# Patient Record
Sex: MALE | Race: WHITE | NOT HISPANIC OR LATINO | URBAN - METROPOLITAN AREA
[De-identification: names, ages, dates, MRNs, and addresses within clinical notes are randomized per-mention and may not be internally consistent; named-entity substitution may affect disease eponyms.]

---

## 2017-01-02 ENCOUNTER — COMMUNICATION - HEALTHEAST (OUTPATIENT)
Dept: INTERNAL MEDICINE | Facility: CLINIC | Age: 82
End: 2017-01-02

## 2017-01-02 DIAGNOSIS — E78.5 HYPERLIPIDEMIA: ICD-10-CM

## 2017-01-09 ENCOUNTER — AMBULATORY - HEALTHEAST (OUTPATIENT)
Dept: PODIATRY | Facility: CLINIC | Age: 82
End: 2017-01-09

## 2017-01-09 DIAGNOSIS — L60.2 ONYCHAUXIS: ICD-10-CM

## 2017-01-09 DIAGNOSIS — N18.30 CKD (CHRONIC KIDNEY DISEASE) STAGE 3, GFR 30-59 ML/MIN (H): ICD-10-CM

## 2017-01-09 DIAGNOSIS — E11.49 TYPE 2 DIABETES MELLITUS WITH NEUROLOGICAL MANIFESTATIONS (H): ICD-10-CM

## 2017-01-09 DIAGNOSIS — M79.673 PAIN OF FOOT, UNSPECIFIED LATERALITY: ICD-10-CM

## 2017-01-20 ENCOUNTER — OFFICE VISIT - HEALTHEAST (OUTPATIENT)
Dept: INTERNAL MEDICINE | Facility: CLINIC | Age: 82
End: 2017-01-20

## 2017-01-20 DIAGNOSIS — I10 HTN (HYPERTENSION): ICD-10-CM

## 2017-01-20 DIAGNOSIS — Z71.89 COUNSELING FOR LIVING WILL: ICD-10-CM

## 2017-01-20 DIAGNOSIS — I63.9 CVA (CEREBRAL VASCULAR ACCIDENT) (H): ICD-10-CM

## 2017-01-20 DIAGNOSIS — N18.30 CKD (CHRONIC KIDNEY DISEASE) STAGE 3, GFR 30-59 ML/MIN (H): ICD-10-CM

## 2017-01-20 DIAGNOSIS — G30.9 ALZHEIMER'S DEMENTIA (H): ICD-10-CM

## 2017-01-20 DIAGNOSIS — Z02.89 ENCOUNTER FOR COMPLETION OF FORM WITH PATIENT: ICD-10-CM

## 2017-01-20 DIAGNOSIS — R80.9 MICROALBUMINURIA: ICD-10-CM

## 2017-01-20 DIAGNOSIS — F02.80 ALZHEIMER'S DEMENTIA (H): ICD-10-CM

## 2017-01-20 DIAGNOSIS — H54.8 LEGALLY BLIND: ICD-10-CM

## 2017-01-20 DIAGNOSIS — Z78.9 FULL CODE STATUS: ICD-10-CM

## 2017-01-20 DIAGNOSIS — I25.10 ASCVD (ARTERIOSCLEROTIC CARDIOVASCULAR DISEASE): ICD-10-CM

## 2017-01-20 DIAGNOSIS — E11.9 DM TYPE 2 (DIABETES MELLITUS, TYPE 2) (H): ICD-10-CM

## 2017-01-20 DIAGNOSIS — R60.0 BILATERAL LOWER EXTREMITY EDEMA: ICD-10-CM

## 2017-01-20 LAB — HBA1C MFR BLD: 7.3 % (ref 3.5–6)

## 2017-03-13 ENCOUNTER — AMBULATORY - HEALTHEAST (OUTPATIENT)
Dept: PODIATRY | Facility: CLINIC | Age: 82
End: 2017-03-13

## 2017-03-13 DIAGNOSIS — E11.49 TYPE 2 DIABETES MELLITUS WITH NEUROLOGICAL MANIFESTATIONS (H): ICD-10-CM

## 2017-03-13 DIAGNOSIS — M79.673 PAIN OF FOOT, UNSPECIFIED LATERALITY: ICD-10-CM

## 2017-03-13 DIAGNOSIS — L60.2 ONYCHAUXIS: ICD-10-CM

## 2017-03-13 DIAGNOSIS — N18.30 CKD (CHRONIC KIDNEY DISEASE) STAGE 3, GFR 30-59 ML/MIN (H): ICD-10-CM

## 2017-03-13 ASSESSMENT — MIFFLIN-ST. JEOR: SCORE: 1337.17

## 2017-03-31 ENCOUNTER — COMMUNICATION - HEALTHEAST (OUTPATIENT)
Dept: INTERNAL MEDICINE | Facility: CLINIC | Age: 82
End: 2017-03-31

## 2017-03-31 DIAGNOSIS — E78.5 HYPERLIPIDEMIA: ICD-10-CM

## 2017-05-06 ENCOUNTER — COMMUNICATION - HEALTHEAST (OUTPATIENT)
Dept: INTERNAL MEDICINE | Facility: CLINIC | Age: 82
End: 2017-05-06

## 2017-05-22 ENCOUNTER — COMMUNICATION - HEALTHEAST (OUTPATIENT)
Dept: TELEHEALTH | Facility: CLINIC | Age: 82
End: 2017-05-22

## 2017-05-22 ENCOUNTER — AMBULATORY - HEALTHEAST (OUTPATIENT)
Dept: PODIATRY | Facility: CLINIC | Age: 82
End: 2017-05-22

## 2017-05-22 DIAGNOSIS — M79.673 PAIN OF FOOT, UNSPECIFIED LATERALITY: ICD-10-CM

## 2017-05-22 DIAGNOSIS — L60.2 ONYCHAUXIS: ICD-10-CM

## 2017-05-22 DIAGNOSIS — N18.30 CKD (CHRONIC KIDNEY DISEASE) STAGE 3, GFR 30-59 ML/MIN (H): ICD-10-CM

## 2017-05-22 DIAGNOSIS — E11.49 TYPE 2 DIABETES MELLITUS WITH NEUROLOGICAL MANIFESTATIONS (H): ICD-10-CM

## 2017-05-25 ENCOUNTER — OFFICE VISIT - HEALTHEAST (OUTPATIENT)
Dept: INTERNAL MEDICINE | Facility: CLINIC | Age: 82
End: 2017-05-25

## 2017-05-25 DIAGNOSIS — Z78.9 FULL CODE STATUS: ICD-10-CM

## 2017-05-25 DIAGNOSIS — F02.80 ALZHEIMER'S DEMENTIA (H): ICD-10-CM

## 2017-05-25 DIAGNOSIS — I10 HTN (HYPERTENSION): ICD-10-CM

## 2017-05-25 DIAGNOSIS — G30.9 ALZHEIMER'S DEMENTIA (H): ICD-10-CM

## 2017-05-25 DIAGNOSIS — N18.30 CKD (CHRONIC KIDNEY DISEASE) STAGE 3, GFR 30-59 ML/MIN (H): ICD-10-CM

## 2017-05-25 DIAGNOSIS — Z00.00 ROUTINE PHYSICAL EXAMINATION: ICD-10-CM

## 2017-05-25 DIAGNOSIS — R32 UI (URINARY INCONTINENCE): ICD-10-CM

## 2017-05-25 DIAGNOSIS — Z02.89 ENCOUNTER FOR COMPLETION OF FORM WITH PATIENT: ICD-10-CM

## 2017-05-25 DIAGNOSIS — I25.10 ASCVD (ARTERIOSCLEROTIC CARDIOVASCULAR DISEASE): ICD-10-CM

## 2017-05-25 DIAGNOSIS — G62.9 PN (PERIPHERAL NEUROPATHY): ICD-10-CM

## 2017-05-25 DIAGNOSIS — E11.9 DM TYPE 2 (DIABETES MELLITUS, TYPE 2) (H): ICD-10-CM

## 2017-05-25 DIAGNOSIS — E78.5 HLD (HYPERLIPIDEMIA): ICD-10-CM

## 2017-05-25 ASSESSMENT — MIFFLIN-ST. JEOR: SCORE: 1346.24

## 2017-06-08 ENCOUNTER — RECORDS - HEALTHEAST (OUTPATIENT)
Dept: ADMINISTRATIVE | Facility: OTHER | Age: 82
End: 2017-06-08

## 2017-06-12 ENCOUNTER — COMMUNICATION - HEALTHEAST (OUTPATIENT)
Dept: INTERNAL MEDICINE | Facility: CLINIC | Age: 82
End: 2017-06-12

## 2017-06-25 ENCOUNTER — COMMUNICATION - HEALTHEAST (OUTPATIENT)
Dept: INTERNAL MEDICINE | Facility: CLINIC | Age: 82
End: 2017-06-25

## 2017-06-25 DIAGNOSIS — I10 HTN (HYPERTENSION): ICD-10-CM

## 2017-07-05 ENCOUNTER — COMMUNICATION - HEALTHEAST (OUTPATIENT)
Dept: INTERNAL MEDICINE | Facility: CLINIC | Age: 82
End: 2017-07-05

## 2017-07-06 ENCOUNTER — COMMUNICATION - HEALTHEAST (OUTPATIENT)
Dept: INTERNAL MEDICINE | Facility: CLINIC | Age: 82
End: 2017-07-06

## 2017-07-08 ENCOUNTER — COMMUNICATION - HEALTHEAST (OUTPATIENT)
Dept: INTERNAL MEDICINE | Facility: CLINIC | Age: 82
End: 2017-07-08

## 2017-07-08 DIAGNOSIS — E78.5 HYPERLIPIDEMIA: ICD-10-CM

## 2017-07-12 ENCOUNTER — COMMUNICATION - HEALTHEAST (OUTPATIENT)
Dept: INTERNAL MEDICINE | Facility: CLINIC | Age: 82
End: 2017-07-12

## 2017-07-13 ENCOUNTER — OFFICE VISIT - HEALTHEAST (OUTPATIENT)
Dept: INTERNAL MEDICINE | Facility: CLINIC | Age: 82
End: 2017-07-13

## 2017-07-13 DIAGNOSIS — F02.80 ALZHEIMER'S DEMENTIA (H): ICD-10-CM

## 2017-07-13 DIAGNOSIS — I10 HTN (HYPERTENSION): ICD-10-CM

## 2017-07-13 DIAGNOSIS — R15.9 FECAL INCONTINENCE: ICD-10-CM

## 2017-07-13 DIAGNOSIS — G30.9 ALZHEIMER'S DEMENTIA (H): ICD-10-CM

## 2017-07-13 DIAGNOSIS — I63.9 CVA (CEREBRAL VASCULAR ACCIDENT) (H): ICD-10-CM

## 2017-07-13 DIAGNOSIS — R32 URINARY INCONTINENCE: ICD-10-CM

## 2017-07-26 ENCOUNTER — OFFICE VISIT - HEALTHEAST (OUTPATIENT)
Dept: GERIATRICS | Facility: CLINIC | Age: 82
End: 2017-07-26

## 2017-07-26 DIAGNOSIS — F02.80 ALZHEIMER'S DISEASE OF OTHER ONSET: ICD-10-CM

## 2017-07-26 DIAGNOSIS — Z91.81 HX OF FALL: ICD-10-CM

## 2017-07-26 DIAGNOSIS — G30.8 ALZHEIMER'S DISEASE OF OTHER ONSET: ICD-10-CM

## 2017-07-26 DIAGNOSIS — E11.65 TYPE 2 DIABETES MELLITUS WITH HYPERGLYCEMIA (H): ICD-10-CM

## 2017-07-26 DIAGNOSIS — I25.10 ASCVD (ARTERIOSCLEROTIC CARDIOVASCULAR DISEASE): ICD-10-CM

## 2017-07-31 ENCOUNTER — OFFICE VISIT - HEALTHEAST (OUTPATIENT)
Dept: GERIATRICS | Facility: CLINIC | Age: 82
End: 2017-07-31

## 2017-07-31 DIAGNOSIS — F02.80 ALZHEIMER'S DISEASE OF OTHER ONSET: ICD-10-CM

## 2017-07-31 DIAGNOSIS — G30.8 ALZHEIMER'S DISEASE OF OTHER ONSET: ICD-10-CM

## 2017-07-31 DIAGNOSIS — F03.90 DEMENTIA (H): ICD-10-CM

## 2017-07-31 DIAGNOSIS — I25.10 ASCVD (ARTERIOSCLEROTIC CARDIOVASCULAR DISEASE): ICD-10-CM

## 2017-07-31 DIAGNOSIS — E11.65 TYPE 2 DIABETES MELLITUS WITH HYPERGLYCEMIA (H): ICD-10-CM

## 2017-08-07 ENCOUNTER — OFFICE VISIT - HEALTHEAST (OUTPATIENT)
Dept: GERIATRICS | Facility: CLINIC | Age: 82
End: 2017-08-07

## 2017-08-07 DIAGNOSIS — F02.80 ALZHEIMER'S DISEASE OF OTHER ONSET: ICD-10-CM

## 2017-08-07 DIAGNOSIS — G30.8 ALZHEIMER'S DISEASE OF OTHER ONSET: ICD-10-CM

## 2017-08-07 DIAGNOSIS — E11.65 TYPE 2 DIABETES MELLITUS WITH HYPERGLYCEMIA (H): ICD-10-CM

## 2017-08-07 DIAGNOSIS — F03.90 DEMENTIA (H): ICD-10-CM

## 2017-08-07 DIAGNOSIS — I25.10 ASCVD (ARTERIOSCLEROTIC CARDIOVASCULAR DISEASE): ICD-10-CM

## 2017-08-14 ENCOUNTER — OFFICE VISIT - HEALTHEAST (OUTPATIENT)
Dept: GERIATRICS | Facility: CLINIC | Age: 82
End: 2017-08-14

## 2017-08-14 DIAGNOSIS — F02.80 ALZHEIMER'S DISEASE OF OTHER ONSET: ICD-10-CM

## 2017-08-14 DIAGNOSIS — E11.65 TYPE 2 DIABETES MELLITUS WITH HYPERGLYCEMIA (H): ICD-10-CM

## 2017-08-14 DIAGNOSIS — I25.10 ASCVD (ARTERIOSCLEROTIC CARDIOVASCULAR DISEASE): ICD-10-CM

## 2017-08-14 DIAGNOSIS — G30.8 ALZHEIMER'S DISEASE OF OTHER ONSET: ICD-10-CM

## 2017-08-14 DIAGNOSIS — I10 ESSENTIAL HYPERTENSION: ICD-10-CM

## 2017-09-21 ENCOUNTER — OFFICE VISIT - HEALTHEAST (OUTPATIENT)
Dept: GERIATRICS | Facility: CLINIC | Age: 82
End: 2017-09-21

## 2017-09-21 DIAGNOSIS — N18.30 CKD (CHRONIC KIDNEY DISEASE) STAGE 3, GFR 30-59 ML/MIN (H): ICD-10-CM

## 2017-09-21 DIAGNOSIS — F02.80 ALZHEIMER'S DISEASE OF OTHER ONSET WITHOUT BEHAVIORAL DISTURBANCE: ICD-10-CM

## 2017-09-21 DIAGNOSIS — E78.5 HLD (HYPERLIPIDEMIA): ICD-10-CM

## 2017-09-21 DIAGNOSIS — I10 ESSENTIAL HYPERTENSION: ICD-10-CM

## 2017-09-21 DIAGNOSIS — E11.3293 TYPE 2 DIABETES MELLITUS WITH BOTH EYES AFFECTED BY MILD NONPROLIFERATIVE RETINOPATHY WITHOUT MACULAR EDEMA, WITHOUT LONG-TERM CURRENT USE OF INSULIN (H): ICD-10-CM

## 2017-09-21 DIAGNOSIS — G30.8 ALZHEIMER'S DISEASE OF OTHER ONSET WITHOUT BEHAVIORAL DISTURBANCE: ICD-10-CM

## 2017-10-25 ENCOUNTER — OFFICE VISIT - HEALTHEAST (OUTPATIENT)
Dept: GERIATRICS | Facility: CLINIC | Age: 82
End: 2017-10-25

## 2017-10-25 DIAGNOSIS — F02.80 ALZHEIMER'S DISEASE OF OTHER ONSET WITHOUT BEHAVIORAL DISTURBANCE: ICD-10-CM

## 2017-10-25 DIAGNOSIS — I10 ESSENTIAL HYPERTENSION: ICD-10-CM

## 2017-10-25 DIAGNOSIS — N18.30 CKD (CHRONIC KIDNEY DISEASE) STAGE 3, GFR 30-59 ML/MIN (H): ICD-10-CM

## 2017-10-25 DIAGNOSIS — G30.8 ALZHEIMER'S DISEASE OF OTHER ONSET WITHOUT BEHAVIORAL DISTURBANCE: ICD-10-CM

## 2017-10-25 DIAGNOSIS — E11.65 TYPE 2 DIABETES MELLITUS WITH HYPERGLYCEMIA (H): ICD-10-CM

## 2017-10-31 ENCOUNTER — RECORDS - HEALTHEAST (OUTPATIENT)
Dept: LAB | Facility: CLINIC | Age: 82
End: 2017-10-31

## 2017-11-01 ENCOUNTER — AMBULATORY - HEALTHEAST (OUTPATIENT)
Dept: GERIATRICS | Facility: CLINIC | Age: 82
End: 2017-11-01

## 2017-11-01 LAB — HBA1C MFR BLD: 8.7 % (ref 4.2–6.1)

## 2017-11-15 ENCOUNTER — OFFICE VISIT - HEALTHEAST (OUTPATIENT)
Dept: GERIATRICS | Facility: CLINIC | Age: 82
End: 2017-11-15

## 2017-11-15 DIAGNOSIS — E11.3293 TYPE 2 DIABETES MELLITUS WITH BOTH EYES AFFECTED BY MILD NONPROLIFERATIVE RETINOPATHY WITHOUT MACULAR EDEMA, WITHOUT LONG-TERM CURRENT USE OF INSULIN (H): ICD-10-CM

## 2017-11-15 DIAGNOSIS — N18.30 CKD (CHRONIC KIDNEY DISEASE) STAGE 3, GFR 30-59 ML/MIN (H): ICD-10-CM

## 2017-11-15 DIAGNOSIS — I10 ESSENTIAL HYPERTENSION: ICD-10-CM

## 2017-11-15 DIAGNOSIS — E55.9 VITAMIN D DEFICIENCY: ICD-10-CM

## 2017-12-21 ENCOUNTER — OFFICE VISIT - HEALTHEAST (OUTPATIENT)
Dept: GERIATRICS | Facility: CLINIC | Age: 82
End: 2017-12-21

## 2017-12-21 DIAGNOSIS — G30.8 ALZHEIMER'S DISEASE OF OTHER ONSET WITHOUT BEHAVIORAL DISTURBANCE: ICD-10-CM

## 2017-12-21 DIAGNOSIS — E55.9 VITAMIN D DEFICIENCY: ICD-10-CM

## 2017-12-21 DIAGNOSIS — F02.80 ALZHEIMER'S DISEASE OF OTHER ONSET WITHOUT BEHAVIORAL DISTURBANCE: ICD-10-CM

## 2017-12-21 DIAGNOSIS — E11.3293 TYPE 2 DIABETES MELLITUS WITH BOTH EYES AFFECTED BY MILD NONPROLIFERATIVE RETINOPATHY WITHOUT MACULAR EDEMA, WITHOUT LONG-TERM CURRENT USE OF INSULIN (H): ICD-10-CM

## 2017-12-21 DIAGNOSIS — I10 ESSENTIAL HYPERTENSION: ICD-10-CM

## 2017-12-21 DIAGNOSIS — N18.30 CKD (CHRONIC KIDNEY DISEASE) STAGE 3, GFR 30-59 ML/MIN (H): ICD-10-CM

## 2017-12-29 ENCOUNTER — AMBULATORY - HEALTHEAST (OUTPATIENT)
Dept: GERIATRICS | Facility: CLINIC | Age: 82
End: 2017-12-29

## 2018-01-02 ENCOUNTER — RECORDS - HEALTHEAST (OUTPATIENT)
Dept: LAB | Facility: CLINIC | Age: 83
End: 2018-01-02

## 2018-01-02 LAB
ALBUMIN UR-MCNC: ABNORMAL MG/DL
APPEARANCE UR: ABNORMAL
BACTERIA #/AREA URNS HPF: ABNORMAL HPF
BILIRUB UR QL STRIP: NEGATIVE
COLOR UR AUTO: YELLOW
GLUCOSE UR STRIP-MCNC: NEGATIVE MG/DL
HGB BLD-MCNC: 11.8 G/DL (ref 14–18)
HGB UR QL STRIP: ABNORMAL
KETONES UR STRIP-MCNC: NEGATIVE MG/DL
LEUKOCYTE ESTERASE UR QL STRIP: ABNORMAL
MUCOUS THREADS #/AREA URNS LPF: ABNORMAL LPF
NITRATE UR QL: NEGATIVE
PH UR STRIP: 6 [PH] (ref 4.5–8)
RBC #/AREA URNS AUTO: ABNORMAL HPF
SP GR UR STRIP: 1.01 (ref 1–1.03)
SQUAMOUS #/AREA URNS AUTO: ABNORMAL LPF
UROBILINOGEN UR STRIP-ACNC: ABNORMAL
WBC #/AREA URNS AUTO: ABNORMAL HPF
WBC CLUMPS #/AREA URNS HPF: PRESENT /[HPF]

## 2018-01-03 LAB — BACTERIA SPEC CULT: NORMAL

## 2018-01-18 ENCOUNTER — OFFICE VISIT - HEALTHEAST (OUTPATIENT)
Dept: GERIATRICS | Facility: CLINIC | Age: 83
End: 2018-01-18

## 2018-01-18 DIAGNOSIS — F02.80 ALZHEIMER'S DISEASE OF OTHER ONSET WITHOUT BEHAVIORAL DISTURBANCE: ICD-10-CM

## 2018-01-18 DIAGNOSIS — E11.3293 TYPE 2 DIABETES MELLITUS WITH BOTH EYES AFFECTED BY MILD NONPROLIFERATIVE RETINOPATHY WITHOUT MACULAR EDEMA, WITHOUT LONG-TERM CURRENT USE OF INSULIN (H): ICD-10-CM

## 2018-01-18 DIAGNOSIS — G30.8 ALZHEIMER'S DISEASE OF OTHER ONSET WITHOUT BEHAVIORAL DISTURBANCE: ICD-10-CM

## 2018-01-18 DIAGNOSIS — E78.5 HYPERLIPIDEMIA, UNSPECIFIED HYPERLIPIDEMIA TYPE: ICD-10-CM

## 2018-01-18 DIAGNOSIS — N18.30 CKD (CHRONIC KIDNEY DISEASE) STAGE 3, GFR 30-59 ML/MIN (H): ICD-10-CM

## 2018-01-18 DIAGNOSIS — I10 ESSENTIAL HYPERTENSION: ICD-10-CM

## 2018-01-18 DIAGNOSIS — E55.9 VITAMIN D DEFICIENCY: ICD-10-CM

## 2018-02-14 ENCOUNTER — OFFICE VISIT - HEALTHEAST (OUTPATIENT)
Dept: GERIATRICS | Facility: CLINIC | Age: 83
End: 2018-02-14

## 2018-02-14 DIAGNOSIS — N18.30 CKD (CHRONIC KIDNEY DISEASE) STAGE 3, GFR 30-59 ML/MIN (H): ICD-10-CM

## 2018-02-14 DIAGNOSIS — F02.80 ALZHEIMER'S DISEASE OF OTHER ONSET WITHOUT BEHAVIORAL DISTURBANCE: ICD-10-CM

## 2018-02-14 DIAGNOSIS — G30.8 ALZHEIMER'S DISEASE OF OTHER ONSET WITHOUT BEHAVIORAL DISTURBANCE: ICD-10-CM

## 2018-02-14 DIAGNOSIS — E11.3293 TYPE 2 DIABETES MELLITUS WITH BOTH EYES AFFECTED BY MILD NONPROLIFERATIVE RETINOPATHY WITHOUT MACULAR EDEMA, WITHOUT LONG-TERM CURRENT USE OF INSULIN (H): ICD-10-CM

## 2018-02-14 DIAGNOSIS — I10 ESSENTIAL HYPERTENSION: ICD-10-CM

## 2018-03-08 ENCOUNTER — OFFICE VISIT - HEALTHEAST (OUTPATIENT)
Dept: GERIATRICS | Facility: CLINIC | Age: 83
End: 2018-03-08

## 2018-03-08 DIAGNOSIS — E11.3293 TYPE 2 DIABETES MELLITUS WITH BOTH EYES AFFECTED BY MILD NONPROLIFERATIVE RETINOPATHY WITHOUT MACULAR EDEMA, WITHOUT LONG-TERM CURRENT USE OF INSULIN (H): ICD-10-CM

## 2018-03-08 DIAGNOSIS — I10 ESSENTIAL HYPERTENSION: ICD-10-CM

## 2018-03-08 DIAGNOSIS — N18.30 CKD (CHRONIC KIDNEY DISEASE) STAGE 3, GFR 30-59 ML/MIN (H): ICD-10-CM

## 2018-03-08 DIAGNOSIS — F02.80 ALZHEIMER'S DISEASE OF OTHER ONSET WITHOUT BEHAVIORAL DISTURBANCE: ICD-10-CM

## 2018-03-08 DIAGNOSIS — E55.9 VITAMIN D DEFICIENCY: ICD-10-CM

## 2018-03-08 DIAGNOSIS — G30.8 ALZHEIMER'S DISEASE OF OTHER ONSET WITHOUT BEHAVIORAL DISTURBANCE: ICD-10-CM
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
## 2018-03-08 DIAGNOSIS — T17.928S ASPIRATION OF FOOD, SEQUELA: ICD-10-CM

## 2018-05-14 ENCOUNTER — RECORDS - HEALTHEAST (OUTPATIENT)
Dept: LAB | Facility: CLINIC | Age: 83
End: 2018-05-14

## 2018-05-14 LAB
ANION GAP SERPL CALCULATED.3IONS-SCNC: 8 MMOL/L (ref 5–18)
BUN SERPL-MCNC: 37 MG/DL (ref 8–28)
CALCIUM SERPL-MCNC: 8.6 MG/DL (ref 8.5–10.5)
CHLORIDE BLD-SCNC: 105 MMOL/L (ref 98–107)
CO2 SERPL-SCNC: 25 MMOL/L (ref 22–31)
CREAT SERPL-MCNC: 1.81 MG/DL (ref 0.7–1.3)
GFR SERPL CREATININE-BSD FRML MDRD: 36 ML/MIN/1.73M2
GLUCOSE BLD-MCNC: 143 MG/DL (ref 70–125)
HBA1C MFR BLD: 6.9 % (ref 4.2–6.1)
POTASSIUM BLD-SCNC: 4.4 MMOL/L (ref 3.5–5)
SODIUM SERPL-SCNC: 138 MMOL/L (ref 136–145)

## 2018-05-17 ENCOUNTER — OFFICE VISIT - HEALTHEAST (OUTPATIENT)
Dept: GERIATRICS | Facility: CLINIC | Age: 83
End: 2018-05-17

## 2018-05-17 DIAGNOSIS — E55.9 VITAMIN D DEFICIENCY: ICD-10-CM

## 2018-05-17 DIAGNOSIS — T17.928S ASPIRATION OF FOOD, SEQUELA: ICD-10-CM

## 2018-05-17 DIAGNOSIS — F02.80 ALZHEIMER'S DISEASE OF OTHER ONSET WITHOUT BEHAVIORAL DISTURBANCE: ICD-10-CM

## 2018-05-17 DIAGNOSIS — I10 ESSENTIAL HYPERTENSION: ICD-10-CM
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
## 2018-05-17 DIAGNOSIS — N18.30 CKD (CHRONIC KIDNEY DISEASE) STAGE 3, GFR 30-59 ML/MIN (H): ICD-10-CM

## 2018-05-17 DIAGNOSIS — E11.3293 TYPE 2 DIABETES MELLITUS WITH BOTH EYES AFFECTED BY MILD NONPROLIFERATIVE RETINOPATHY WITHOUT MACULAR EDEMA, WITHOUT LONG-TERM CURRENT USE OF INSULIN (H): ICD-10-CM

## 2018-05-17 DIAGNOSIS — G30.8 ALZHEIMER'S DISEASE OF OTHER ONSET WITHOUT BEHAVIORAL DISTURBANCE: ICD-10-CM

## 2018-06-11 ENCOUNTER — OFFICE VISIT - HEALTHEAST (OUTPATIENT)
Dept: GERIATRICS | Facility: CLINIC | Age: 83
End: 2018-06-11

## 2018-06-11 DIAGNOSIS — N18.30 CKD (CHRONIC KIDNEY DISEASE) STAGE 3, GFR 30-59 ML/MIN (H): ICD-10-CM

## 2018-06-11 DIAGNOSIS — G30.8 ALZHEIMER'S DISEASE OF OTHER ONSET WITHOUT BEHAVIORAL DISTURBANCE: ICD-10-CM

## 2018-06-11 DIAGNOSIS — E55.9 VITAMIN D DEFICIENCY: ICD-10-CM

## 2018-06-11 DIAGNOSIS — F02.80 ALZHEIMER'S DISEASE OF OTHER ONSET WITHOUT BEHAVIORAL DISTURBANCE: ICD-10-CM

## 2018-06-11 DIAGNOSIS — E11.3293 TYPE 2 DIABETES MELLITUS WITH BOTH EYES AFFECTED BY MILD NONPROLIFERATIVE RETINOPATHY WITHOUT MACULAR EDEMA, WITHOUT LONG-TERM CURRENT USE OF INSULIN (H): ICD-10-CM

## 2018-06-11 DIAGNOSIS — I10 ESSENTIAL HYPERTENSION: ICD-10-CM

## 2018-06-11 DIAGNOSIS — I63.9 CEREBROVASCULAR ACCIDENT (CVA), UNSPECIFIED MECHANISM (H): ICD-10-CM

## 2018-06-18 ENCOUNTER — OFFICE VISIT - HEALTHEAST (OUTPATIENT)
Dept: GERIATRICS | Facility: CLINIC | Age: 83
End: 2018-06-18

## 2018-06-18 DIAGNOSIS — F02.80 ALZHEIMER'S DISEASE OF OTHER ONSET WITHOUT BEHAVIORAL DISTURBANCE: ICD-10-CM

## 2018-06-18 DIAGNOSIS — G30.8 ALZHEIMER'S DISEASE OF OTHER ONSET WITHOUT BEHAVIORAL DISTURBANCE: ICD-10-CM

## 2018-06-18 DIAGNOSIS — E55.9 VITAMIN D DEFICIENCY: ICD-10-CM

## 2018-06-18 DIAGNOSIS — I63.9 CEREBROVASCULAR ACCIDENT (CVA), UNSPECIFIED MECHANISM (H): ICD-10-CM

## 2018-06-18 DIAGNOSIS — N18.30 CKD (CHRONIC KIDNEY DISEASE) STAGE 3, GFR 30-59 ML/MIN (H): ICD-10-CM

## 2018-06-18 DIAGNOSIS — E11.3293 TYPE 2 DIABETES MELLITUS WITH BOTH EYES AFFECTED BY MILD NONPROLIFERATIVE RETINOPATHY WITHOUT MACULAR EDEMA, WITHOUT LONG-TERM CURRENT USE OF INSULIN (H): ICD-10-CM

## 2018-06-19 ENCOUNTER — OFFICE VISIT - HEALTHEAST (OUTPATIENT)
Dept: GERIATRICS | Facility: CLINIC | Age: 83
End: 2018-06-19
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
## 2018-06-19 DIAGNOSIS — E55.9 VITAMIN D DEFICIENCY: ICD-10-CM

## 2018-06-19 DIAGNOSIS — N18.30 CKD (CHRONIC KIDNEY DISEASE) STAGE 3, GFR 30-59 ML/MIN (H): ICD-10-CM

## 2018-06-19 DIAGNOSIS — I10 ESSENTIAL HYPERTENSION: ICD-10-CM

## 2018-06-19 DIAGNOSIS — T17.928S ASPIRATION OF FOOD, SEQUELA: ICD-10-CM

## 2018-06-19 DIAGNOSIS — E11.3293 TYPE 2 DIABETES MELLITUS WITH BOTH EYES AFFECTED BY MILD NONPROLIFERATIVE RETINOPATHY WITHOUT MACULAR EDEMA, WITHOUT LONG-TERM CURRENT USE OF INSULIN (H): ICD-10-CM

## 2018-06-19 DIAGNOSIS — F02.80 ALZHEIMER'S DISEASE OF OTHER ONSET WITHOUT BEHAVIORAL DISTURBANCE: ICD-10-CM

## 2018-06-19 DIAGNOSIS — G30.8 ALZHEIMER'S DISEASE OF OTHER ONSET WITHOUT BEHAVIORAL DISTURBANCE: ICD-10-CM

## 2018-07-19 ENCOUNTER — OFFICE VISIT - HEALTHEAST (OUTPATIENT)
Dept: GERIATRICS | Facility: CLINIC | Age: 83
End: 2018-07-19

## 2018-07-19 DIAGNOSIS — N18.30 CKD (CHRONIC KIDNEY DISEASE) STAGE 3, GFR 30-59 ML/MIN (H): ICD-10-CM

## 2018-07-19 DIAGNOSIS — T17.928S ASPIRATION OF FOOD, SEQUELA: ICD-10-CM

## 2018-07-19 DIAGNOSIS — I10 ESSENTIAL HYPERTENSION: ICD-10-CM

## 2018-07-19 DIAGNOSIS — G30.8 ALZHEIMER'S DISEASE OF OTHER ONSET WITHOUT BEHAVIORAL DISTURBANCE: ICD-10-CM

## 2018-07-19 DIAGNOSIS — F02.80 ALZHEIMER'S DISEASE OF OTHER ONSET WITHOUT BEHAVIORAL DISTURBANCE: ICD-10-CM
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
## 2018-07-19 DIAGNOSIS — E55.9 VITAMIN D DEFICIENCY: ICD-10-CM

## 2018-07-19 DIAGNOSIS — E11.3293 TYPE 2 DIABETES MELLITUS WITH BOTH EYES AFFECTED BY MILD NONPROLIFERATIVE RETINOPATHY WITHOUT MACULAR EDEMA, WITHOUT LONG-TERM CURRENT USE OF INSULIN (H): ICD-10-CM

## 2018-07-19 DIAGNOSIS — R13.10 DYSPHAGIA, UNSPECIFIED TYPE: ICD-10-CM

## 2018-07-26 ENCOUNTER — OFFICE VISIT - HEALTHEAST (OUTPATIENT)
Dept: GERIATRICS | Facility: CLINIC | Age: 83
End: 2018-07-26

## 2018-07-26 DIAGNOSIS — E55.9 VITAMIN D DEFICIENCY: ICD-10-CM

## 2018-07-26 DIAGNOSIS — R13.10 DYSPHAGIA, UNSPECIFIED TYPE: ICD-10-CM

## 2018-07-26 DIAGNOSIS — I10 ESSENTIAL HYPERTENSION: ICD-10-CM

## 2018-07-26 DIAGNOSIS — G30.9 ALZHEIMER'S DISEASE (H): ICD-10-CM

## 2018-07-26 DIAGNOSIS — F02.80 ALZHEIMER'S DISEASE (H): ICD-10-CM

## 2018-07-26 DIAGNOSIS — E11.3293 TYPE 2 DIABETES MELLITUS WITH BOTH EYES AFFECTED BY MILD NONPROLIFERATIVE RETINOPATHY WITHOUT MACULAR EDEMA, WITHOUT LONG-TERM CURRENT USE OF INSULIN (H): ICD-10-CM

## 2018-07-26 DIAGNOSIS — N18.30 CKD (CHRONIC KIDNEY DISEASE) STAGE 3, GFR 30-59 ML/MIN (H): ICD-10-CM

## 2018-07-26 DIAGNOSIS — I63.9 CEREBROVASCULAR ACCIDENT (CVA), UNSPECIFIED MECHANISM (H): ICD-10-CM

## 2018-08-16 ENCOUNTER — OFFICE VISIT - HEALTHEAST (OUTPATIENT)
Dept: GERIATRICS | Facility: CLINIC | Age: 83
End: 2018-08-16

## 2018-08-16 DIAGNOSIS — G30.9 ALZHEIMER'S DISEASE (H): ICD-10-CM

## 2018-08-16 DIAGNOSIS — E55.9 VITAMIN D DEFICIENCY: ICD-10-CM

## 2018-08-16 DIAGNOSIS — I10 ESSENTIAL HYPERTENSION: ICD-10-CM

## 2018-08-16 DIAGNOSIS — R13.10 DYSPHAGIA, UNSPECIFIED TYPE: ICD-10-CM

## 2018-08-16 DIAGNOSIS — N18.30 CKD (CHRONIC KIDNEY DISEASE) STAGE 3, GFR 30-59 ML/MIN (H): ICD-10-CM

## 2018-08-16 DIAGNOSIS — F02.80 ALZHEIMER'S DISEASE (H): ICD-10-CM

## 2018-08-16 DIAGNOSIS — E11.3293 TYPE 2 DIABETES MELLITUS WITH BOTH EYES AFFECTED BY MILD NONPROLIFERATIVE RETINOPATHY WITHOUT MACULAR EDEMA, WITHOUT LONG-TERM CURRENT USE OF INSULIN (H): ICD-10-CM

## 2018-09-10 ENCOUNTER — OFFICE VISIT - HEALTHEAST (OUTPATIENT)
Dept: GERIATRICS | Facility: CLINIC | Age: 83
End: 2018-09-10

## 2018-09-10 DIAGNOSIS — E11.3293 TYPE 2 DIABETES MELLITUS WITH BOTH EYES AFFECTED BY MILD NONPROLIFERATIVE RETINOPATHY WITHOUT MACULAR EDEMA, WITHOUT LONG-TERM CURRENT USE OF INSULIN (H): ICD-10-CM

## 2018-09-10 DIAGNOSIS — N18.30 CKD (CHRONIC KIDNEY DISEASE) STAGE 3, GFR 30-59 ML/MIN (H): ICD-10-CM

## 2018-09-10 DIAGNOSIS — G30.9 ALZHEIMER'S DISEASE (H): ICD-10-CM

## 2018-09-10 DIAGNOSIS — I10 ESSENTIAL HYPERTENSION: ICD-10-CM

## 2018-09-10 DIAGNOSIS — F02.80 ALZHEIMER'S DISEASE (H): ICD-10-CM

## 2018-09-20 ENCOUNTER — OFFICE VISIT - HEALTHEAST (OUTPATIENT)
Dept: GERIATRICS | Facility: CLINIC | Age: 83
End: 2018-09-20

## 2018-09-20 DIAGNOSIS — I10 ESSENTIAL HYPERTENSION: ICD-10-CM

## 2018-09-20 DIAGNOSIS — F02.80 ALZHEIMER'S DISEASE OF OTHER ONSET WITHOUT BEHAVIORAL DISTURBANCE: ICD-10-CM

## 2018-09-20 DIAGNOSIS — E11.3293 TYPE 2 DIABETES MELLITUS WITH BOTH EYES AFFECTED BY MILD NONPROLIFERATIVE RETINOPATHY WITHOUT MACULAR EDEMA, WITHOUT LONG-TERM CURRENT USE OF INSULIN (H): ICD-10-CM

## 2018-09-20 DIAGNOSIS — R13.10 DYSPHAGIA, UNSPECIFIED TYPE: ICD-10-CM

## 2018-09-20 DIAGNOSIS — G30.8 ALZHEIMER'S DISEASE OF OTHER ONSET WITHOUT BEHAVIORAL DISTURBANCE: ICD-10-CM

## 2018-09-20 DIAGNOSIS — I63.9 CEREBROVASCULAR ACCIDENT (CVA), UNSPECIFIED MECHANISM (H): ICD-10-CM

## 2018-09-20 DIAGNOSIS — E55.9 VITAMIN D DEFICIENCY: ICD-10-CM

## 2018-09-20 DIAGNOSIS — N18.30 CKD (CHRONIC KIDNEY DISEASE) STAGE 3, GFR 30-59 ML/MIN (H): ICD-10-CM

## 2018-10-18 ENCOUNTER — OFFICE VISIT - HEALTHEAST (OUTPATIENT)
Dept: GERIATRICS | Facility: CLINIC | Age: 83
End: 2018-10-18

## 2018-10-18 DIAGNOSIS — E11.3293 TYPE 2 DIABETES MELLITUS WITH BOTH EYES AFFECTED BY MILD NONPROLIFERATIVE RETINOPATHY WITHOUT MACULAR EDEMA, WITHOUT LONG-TERM CURRENT USE OF INSULIN (H): ICD-10-CM

## 2018-10-18 DIAGNOSIS — N18.30 CKD (CHRONIC KIDNEY DISEASE) STAGE 3, GFR 30-59 ML/MIN (H): ICD-10-CM

## 2018-10-18 DIAGNOSIS — G30.8 ALZHEIMER'S DISEASE OF OTHER ONSET WITHOUT BEHAVIORAL DISTURBANCE: ICD-10-CM

## 2018-10-18 DIAGNOSIS — F02.80 ALZHEIMER'S DISEASE OF OTHER ONSET WITHOUT BEHAVIORAL DISTURBANCE: ICD-10-CM

## 2018-10-18 DIAGNOSIS — R13.10 DYSPHAGIA, UNSPECIFIED TYPE: ICD-10-CM

## 2018-10-18 DIAGNOSIS — I10 ESSENTIAL HYPERTENSION: ICD-10-CM

## 2018-10-18 DIAGNOSIS — E55.9 VITAMIN D DEFICIENCY: ICD-10-CM

## 2018-10-18 DIAGNOSIS — H54.8 LEGALLY BLIND: ICD-10-CM

## 2018-10-24 ENCOUNTER — COMMUNICATION - HEALTHEAST (OUTPATIENT)
Dept: GERIATRICS | Facility: CLINIC | Age: 83
End: 2018-10-24

## 2018-11-12 ENCOUNTER — RECORDS - HEALTHEAST (OUTPATIENT)
Dept: LAB | Facility: CLINIC | Age: 83
End: 2018-11-12

## 2018-11-12 LAB
BASOPHILS # BLD AUTO: 0 THOU/UL (ref 0–0.2)
BASOPHILS NFR BLD AUTO: 0 % (ref 0–2)
EOSINOPHIL # BLD AUTO: 0.1 THOU/UL (ref 0–0.4)
EOSINOPHIL NFR BLD AUTO: 1 % (ref 0–6)
ERYTHROCYTE [DISTWIDTH] IN BLOOD BY AUTOMATED COUNT: 13.5 % (ref 11–14.5)
HCT VFR BLD AUTO: 31.8 % (ref 40–54)
HGB BLD-MCNC: 10.1 G/DL (ref 14–18)
LYMPHOCYTES # BLD AUTO: 0.7 THOU/UL (ref 0.8–4.4)
LYMPHOCYTES NFR BLD AUTO: 6 % (ref 20–40)
MCH RBC QN AUTO: 31.7 PG (ref 27–34)
MCHC RBC AUTO-ENTMCNC: 31.8 G/DL (ref 32–36)
MCV RBC AUTO: 100 FL (ref 80–100)
MONOCYTES # BLD AUTO: 1 THOU/UL (ref 0–0.9)
MONOCYTES NFR BLD AUTO: 10 % (ref 2–10)
NEUTROPHILS # BLD AUTO: 8.7 THOU/UL (ref 2–7.7)
NEUTROPHILS NFR BLD AUTO: 83 % (ref 50–70)
PLATELET # BLD AUTO: 252 THOU/UL (ref 140–440)
PMV BLD AUTO: 10.1 FL (ref 8.5–12.5)
RBC # BLD AUTO: 3.19 MILL/UL (ref 4.4–6.2)
WBC: 10.6 THOU/UL (ref 4–11)

## 2018-11-13 ENCOUNTER — COMMUNICATION - HEALTHEAST (OUTPATIENT)
Dept: GERIATRICS | Facility: CLINIC | Age: 83
End: 2018-11-13

## 2018-11-13 ENCOUNTER — RECORDS - HEALTHEAST (OUTPATIENT)
Dept: LAB | Facility: CLINIC | Age: 83
End: 2018-11-13

## 2018-11-13 ENCOUNTER — OFFICE VISIT - HEALTHEAST (OUTPATIENT)
Dept: GERIATRICS | Facility: CLINIC | Age: 83
End: 2018-11-13

## 2018-11-13 DIAGNOSIS — E11.3293 TYPE 2 DIABETES MELLITUS WITH BOTH EYES AFFECTED BY MILD NONPROLIFERATIVE RETINOPATHY WITHOUT MACULAR EDEMA, WITHOUT LONG-TERM CURRENT USE OF INSULIN (H): ICD-10-CM

## 2018-11-13 DIAGNOSIS — I63.9 CEREBROVASCULAR ACCIDENT (CVA), UNSPECIFIED MECHANISM (H): ICD-10-CM

## 2018-11-13 DIAGNOSIS — N30.00 ACUTE CYSTITIS WITHOUT HEMATURIA: ICD-10-CM

## 2018-11-13 DIAGNOSIS — N18.30 CKD (CHRONIC KIDNEY DISEASE) STAGE 3, GFR 30-59 ML/MIN (H): ICD-10-CM

## 2018-11-13 DIAGNOSIS — E55.9 VITAMIN D DEFICIENCY: ICD-10-CM

## 2018-11-13 DIAGNOSIS — R13.10 DYSPHAGIA, UNSPECIFIED TYPE: ICD-10-CM

## 2018-11-13 DIAGNOSIS — G30.9 ALZHEIMER'S DEMENTIA WITHOUT BEHAVIORAL DISTURBANCE, UNSPECIFIED TIMING OF DEMENTIA ONSET: ICD-10-CM

## 2018-11-13 DIAGNOSIS — F02.80 ALZHEIMER'S DEMENTIA WITHOUT BEHAVIORAL DISTURBANCE, UNSPECIFIED TIMING OF DEMENTIA ONSET: ICD-10-CM

## 2018-11-13 DIAGNOSIS — H54.8 LEGALLY BLIND: ICD-10-CM

## 2018-11-13 DIAGNOSIS — T17.928S ASPIRATION OF FOOD, SEQUELA: ICD-10-CM
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
## 2018-11-13 LAB
ALBUMIN UR-MCNC: ABNORMAL MG/DL
APPEARANCE UR: ABNORMAL
BACTERIA #/AREA URNS HPF: ABNORMAL HPF
BILIRUB UR QL STRIP: NEGATIVE
COLOR UR AUTO: YELLOW
GLUCOSE UR STRIP-MCNC: ABNORMAL MG/DL
HGB UR QL STRIP: ABNORMAL
KETONES UR STRIP-MCNC: NEGATIVE MG/DL
LEUKOCYTE ESTERASE UR QL STRIP: ABNORMAL
NITRATE UR QL: POSITIVE
PH UR STRIP: 5.5 [PH] (ref 4.5–8)
RBC #/AREA URNS AUTO: ABNORMAL HPF
SP GR UR STRIP: 1.02 (ref 1–1.03)
SQUAMOUS #/AREA URNS AUTO: ABNORMAL LPF
TRANS CELLS #/AREA URNS HPF: ABNORMAL LPF
UROBILINOGEN UR STRIP-ACNC: ABNORMAL
WBC #/AREA URNS AUTO: >100 HPF
WBC CLUMPS #/AREA URNS HPF: PRESENT /[HPF]

## 2018-11-15 LAB
BACTERIA SPEC CULT: ABNORMAL
BACTERIA SPEC CULT: ABNORMAL

## 2018-12-05 ENCOUNTER — RECORDS - HEALTHEAST (OUTPATIENT)
Dept: LAB | Facility: CLINIC | Age: 83
End: 2018-12-05

## 2018-12-05 LAB
ALBUMIN UR-MCNC: ABNORMAL MG/DL
APPEARANCE UR: ABNORMAL
BACTERIA #/AREA URNS HPF: ABNORMAL HPF
BILIRUB UR QL STRIP: NEGATIVE
COLOR UR AUTO: ABNORMAL
GLUCOSE UR STRIP-MCNC: ABNORMAL MG/DL
HGB UR QL STRIP: NEGATIVE
KETONES UR STRIP-MCNC: NEGATIVE MG/DL
LEUKOCYTE ESTERASE UR QL STRIP: ABNORMAL
MUCOUS THREADS #/AREA URNS LPF: ABNORMAL LPF
NITRATE UR QL: NEGATIVE
PH UR STRIP: 5.5 [PH] (ref 4.5–8)
RBC #/AREA URNS AUTO: ABNORMAL HPF
SP GR UR STRIP: 1.02 (ref 1–1.03)
SQUAMOUS #/AREA URNS AUTO: ABNORMAL LPF
UROBILINOGEN UR STRIP-ACNC: ABNORMAL
WBC #/AREA URNS AUTO: >100 HPF
WBC CLUMPS #/AREA URNS HPF: PRESENT /[HPF]

## 2018-12-07 LAB — BACTERIA SPEC CULT: ABNORMAL

## 2018-12-08 ENCOUNTER — RECORDS - HEALTHEAST (OUTPATIENT)
Dept: LAB | Facility: CLINIC | Age: 83
End: 2018-12-08

## 2018-12-08 LAB
BASOPHILS # BLD AUTO: 0.1 THOU/UL (ref 0–0.2)
BASOPHILS NFR BLD AUTO: 1 % (ref 0–2)
EOSINOPHIL # BLD AUTO: 0.5 THOU/UL (ref 0–0.4)
EOSINOPHIL NFR BLD AUTO: 6 % (ref 0–6)
ERYTHROCYTE [DISTWIDTH] IN BLOOD BY AUTOMATED COUNT: 14.9 % (ref 11–14.5)
HCT VFR BLD AUTO: 31.9 % (ref 40–54)
HGB BLD-MCNC: 10.2 G/DL (ref 14–18)
LYMPHOCYTES # BLD AUTO: 1 THOU/UL (ref 0.8–4.4)
LYMPHOCYTES NFR BLD AUTO: 13 % (ref 20–40)
MCH RBC QN AUTO: 31.4 PG (ref 27–34)
MCHC RBC AUTO-ENTMCNC: 32 G/DL (ref 32–36)
MCV RBC AUTO: 98 FL (ref 80–100)
MONOCYTES # BLD AUTO: 0.8 THOU/UL (ref 0–0.9)
MONOCYTES NFR BLD AUTO: 11 % (ref 2–10)
NEUTROPHILS # BLD AUTO: 5.4 THOU/UL (ref 2–7.7)
NEUTROPHILS NFR BLD AUTO: 70 % (ref 50–70)
PLATELET # BLD AUTO: 220 THOU/UL (ref 140–440)
PMV BLD AUTO: 10 FL (ref 8.5–12.5)
RBC # BLD AUTO: 3.25 MILL/UL (ref 4.4–6.2)
WBC: 7.7 THOU/UL (ref 4–11)

## 2018-12-20 ENCOUNTER — OFFICE VISIT - HEALTHEAST (OUTPATIENT)
Dept: GERIATRICS | Facility: CLINIC | Age: 83
End: 2018-12-20
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
## 2018-12-20 DIAGNOSIS — I10 ESSENTIAL HYPERTENSION: ICD-10-CM

## 2018-12-20 DIAGNOSIS — T17.928S ASPIRATION OF FOOD, SEQUELA: ICD-10-CM

## 2018-12-20 DIAGNOSIS — N18.30 CKD (CHRONIC KIDNEY DISEASE) STAGE 3, GFR 30-59 ML/MIN (H): ICD-10-CM

## 2018-12-20 DIAGNOSIS — E55.9 VITAMIN D DEFICIENCY: ICD-10-CM

## 2018-12-20 DIAGNOSIS — G30.9 ALZHEIMER'S DEMENTIA WITHOUT BEHAVIORAL DISTURBANCE, UNSPECIFIED TIMING OF DEMENTIA ONSET: ICD-10-CM

## 2018-12-20 DIAGNOSIS — R13.10 DYSPHAGIA, UNSPECIFIED TYPE: ICD-10-CM

## 2018-12-20 DIAGNOSIS — F02.80 ALZHEIMER'S DEMENTIA WITHOUT BEHAVIORAL DISTURBANCE, UNSPECIFIED TIMING OF DEMENTIA ONSET: ICD-10-CM

## 2018-12-26 ENCOUNTER — RECORDS - HEALTHEAST (OUTPATIENT)
Dept: LAB | Facility: CLINIC | Age: 83
End: 2018-12-26

## 2018-12-26 LAB — HBA1C MFR BLD: 8 % (ref 4.2–6.1)

## 2019-01-01 ENCOUNTER — HOME CARE/HOSPICE - HEALTHEAST (OUTPATIENT)
Dept: HOSPICE | Facility: HOSPICE | Age: 84
End: 2019-01-01

## 2019-01-01 ENCOUNTER — RECORDS - HEALTHEAST (OUTPATIENT)
Dept: LAB | Facility: CLINIC | Age: 84
End: 2019-01-01

## 2019-01-01 ENCOUNTER — OFFICE VISIT - HEALTHEAST (OUTPATIENT)
Dept: GERIATRICS | Facility: CLINIC | Age: 84
End: 2019-01-01

## 2019-01-01 ENCOUNTER — AMBULATORY - HEALTHEAST (OUTPATIENT)
Dept: HOSPICE | Facility: HOSPICE | Age: 84
End: 2019-01-01

## 2019-01-01 ENCOUNTER — DOCUMENTATION ONLY (OUTPATIENT)
Dept: OTHER | Facility: CLINIC | Age: 84
End: 2019-01-01

## 2019-01-01 ENCOUNTER — AMBULATORY - HEALTHEAST (OUTPATIENT)
Dept: OTHER | Facility: CLINIC | Age: 84
End: 2019-01-01

## 2019-01-01 DIAGNOSIS — F02.80 ALZHEIMER'S DISEASE OF OTHER ONSET WITHOUT BEHAVIORAL DISTURBANCE: ICD-10-CM

## 2019-01-01 DIAGNOSIS — E11.3293 TYPE 2 DIABETES MELLITUS WITH BOTH EYES AFFECTED BY MILD NONPROLIFERATIVE RETINOPATHY WITHOUT MACULAR EDEMA, WITHOUT LONG-TERM CURRENT USE OF INSULIN (H): ICD-10-CM

## 2019-01-01 DIAGNOSIS — G30.9 ALZHEIMER'S DEMENTIA WITHOUT BEHAVIORAL DISTURBANCE, UNSPECIFIED TIMING OF DEMENTIA ONSET: ICD-10-CM

## 2019-01-01 DIAGNOSIS — N18.30 CKD (CHRONIC KIDNEY DISEASE) STAGE 3, GFR 30-59 ML/MIN (H): ICD-10-CM

## 2019-01-01 DIAGNOSIS — I10 ESSENTIAL HYPERTENSION: ICD-10-CM

## 2019-01-01 DIAGNOSIS — R13.10 DYSPHAGIA, UNSPECIFIED TYPE: ICD-10-CM

## 2019-01-01 DIAGNOSIS — H54.8 LEGALLY BLIND: ICD-10-CM

## 2019-01-01 DIAGNOSIS — G30.8 ALZHEIMER'S DISEASE OF OTHER ONSET WITHOUT BEHAVIORAL DISTURBANCE: ICD-10-CM

## 2019-01-01 DIAGNOSIS — E55.9 VITAMIN D DEFICIENCY: ICD-10-CM

## 2019-01-01 DIAGNOSIS — F02.80 ALZHEIMER'S DEMENTIA WITHOUT BEHAVIORAL DISTURBANCE, UNSPECIFIED TIMING OF DEMENTIA ONSET: ICD-10-CM

## 2019-01-01 DIAGNOSIS — I63.9 CEREBROVASCULAR ACCIDENT (CVA), UNSPECIFIED MECHANISM (H): ICD-10-CM

## 2019-01-01 DIAGNOSIS — Z87.891 FORMER SMOKER: ICD-10-CM

## 2019-01-01 LAB
25(OH)D3 SERPL-MCNC: 40.7 NG/ML (ref 30–80)
ALBUMIN UR-MCNC: ABNORMAL MG/DL
ALBUMIN UR-MCNC: ABNORMAL MG/DL
AMORPH CRY #/AREA URNS HPF: ABNORMAL /[HPF]
ANION GAP SERPL CALCULATED.3IONS-SCNC: 10 MMOL/L (ref 5–18)
ANION GAP SERPL CALCULATED.3IONS-SCNC: 7 MMOL/L (ref 5–18)
ANION GAP SERPL CALCULATED.3IONS-SCNC: 9 MMOL/L (ref 5–18)
APPEARANCE UR: ABNORMAL
APPEARANCE UR: ABNORMAL
BACTERIA #/AREA URNS HPF: ABNORMAL HPF
BACTERIA #/AREA URNS HPF: ABNORMAL HPF
BACTERIA SPEC CULT: ABNORMAL
BASOPHILS # BLD AUTO: 0 THOU/UL (ref 0–0.2)
BASOPHILS # BLD AUTO: 0.1 THOU/UL (ref 0–0.2)
BASOPHILS # BLD AUTO: 0.1 THOU/UL (ref 0–0.2)
BASOPHILS NFR BLD AUTO: 0 % (ref 0–2)
BASOPHILS NFR BLD AUTO: 1 % (ref 0–2)
BASOPHILS NFR BLD AUTO: 1 % (ref 0–2)
BILIRUB UR QL STRIP: NEGATIVE
BILIRUB UR QL STRIP: NEGATIVE
BUN SERPL-MCNC: 37 MG/DL (ref 8–28)
BUN SERPL-MCNC: 37 MG/DL (ref 8–28)
BUN SERPL-MCNC: 41 MG/DL (ref 8–28)
BUN SERPL-MCNC: 54 MG/DL (ref 8–28)
BUN SERPL-MCNC: 56 MG/DL (ref 8–28)
CALCIUM SERPL-MCNC: 8.1 MG/DL (ref 8.5–10.5)
CALCIUM SERPL-MCNC: 8.3 MG/DL (ref 8.5–10.5)
CALCIUM SERPL-MCNC: 8.4 MG/DL (ref 8.5–10.5)
CALCIUM SERPL-MCNC: 9 MG/DL (ref 8.5–10.5)
CALCIUM SERPL-MCNC: 9.1 MG/DL (ref 8.5–10.5)
CHLORIDE BLD-SCNC: 105 MMOL/L (ref 98–107)
CHLORIDE BLD-SCNC: 106 MMOL/L (ref 98–107)
CHLORIDE BLD-SCNC: 109 MMOL/L (ref 98–107)
CHLORIDE BLD-SCNC: 110 MMOL/L (ref 98–107)
CHLORIDE BLD-SCNC: 113 MMOL/L (ref 98–107)
CO2 SERPL-SCNC: 23 MMOL/L (ref 22–31)
CO2 SERPL-SCNC: 24 MMOL/L (ref 22–31)
CO2 SERPL-SCNC: 25 MMOL/L (ref 22–31)
CO2 SERPL-SCNC: 25 MMOL/L (ref 22–31)
CO2 SERPL-SCNC: 27 MMOL/L (ref 22–31)
COLOR UR AUTO: YELLOW
COLOR UR AUTO: YELLOW
CREAT SERPL-MCNC: 1.8 MG/DL (ref 0.7–1.3)
CREAT SERPL-MCNC: 1.85 MG/DL (ref 0.7–1.3)
CREAT SERPL-MCNC: 1.97 MG/DL (ref 0.7–1.3)
CREAT SERPL-MCNC: 2.3 MG/DL (ref 0.7–1.3)
CREAT SERPL-MCNC: 2.36 MG/DL (ref 0.7–1.3)
ENTERIC PATHOGEN COMMENT: NORMAL
EOSINOPHIL # BLD AUTO: 0.2 THOU/UL (ref 0–0.4)
EOSINOPHIL # BLD AUTO: 0.3 THOU/UL (ref 0–0.4)
EOSINOPHIL # BLD AUTO: 0.4 THOU/UL (ref 0–0.4)
EOSINOPHIL NFR BLD AUTO: 3 % (ref 0–6)
EOSINOPHIL NFR BLD AUTO: 4 % (ref 0–6)
EOSINOPHIL NFR BLD AUTO: 6 % (ref 0–6)
ERYTHROCYTE [DISTWIDTH] IN BLOOD BY AUTOMATED COUNT: 15.2 % (ref 11–14.5)
ERYTHROCYTE [DISTWIDTH] IN BLOOD BY AUTOMATED COUNT: 17.4 % (ref 11–14.5)
ERYTHROCYTE [DISTWIDTH] IN BLOOD BY AUTOMATED COUNT: 17.8 % (ref 11–14.5)
FERRITIN SERPL-MCNC: 147 NG/ML (ref 27–300)
FOLATE SERPL-MCNC: 11.3 NG/ML
GFR SERPL CREATININE-BSD FRML MDRD: 26 ML/MIN/1.73M2
GFR SERPL CREATININE-BSD FRML MDRD: 27 ML/MIN/1.73M2
GFR SERPL CREATININE-BSD FRML MDRD: 33 ML/MIN/1.73M2
GFR SERPL CREATININE-BSD FRML MDRD: 35 ML/MIN/1.73M2
GFR SERPL CREATININE-BSD FRML MDRD: 36 ML/MIN/1.73M2
GLUCOSE BLD-MCNC: 125 MG/DL (ref 70–125)
GLUCOSE BLD-MCNC: 145 MG/DL (ref 70–125)
GLUCOSE BLD-MCNC: 194 MG/DL (ref 70–125)
GLUCOSE BLD-MCNC: 231 MG/DL (ref 70–125)
GLUCOSE BLD-MCNC: 95 MG/DL (ref 70–125)
GLUCOSE UR STRIP-MCNC: NEGATIVE MG/DL
GLUCOSE UR STRIP-MCNC: NEGATIVE MG/DL
HBA1C MFR BLD: 7.4 % (ref 4.2–6.1)
HBA1C MFR BLD: 7.6 % (ref 4.2–6.1)
HBA1C MFR BLD: 7.7 % (ref 4.2–6.1)
HCT VFR BLD AUTO: 22.5 % (ref 40–54)
HCT VFR BLD AUTO: 23.3 % (ref 40–54)
HCT VFR BLD AUTO: 24.2 % (ref 40–54)
HEMOCCULT STL QL: NEGATIVE
HGB BLD-MCNC: 6.9 G/DL (ref 14–18)
HGB BLD-MCNC: 7.1 G/DL (ref 14–18)
HGB BLD-MCNC: 7.2 G/DL (ref 14–18)
HGB UR QL STRIP: ABNORMAL
HGB UR QL STRIP: ABNORMAL
IRON SATN MFR SERPL: 17 % (ref 20–50)
IRON SERPL-MCNC: 37 UG/DL (ref 42–175)
KETONES UR STRIP-MCNC: NEGATIVE MG/DL
KETONES UR STRIP-MCNC: NEGATIVE MG/DL
LAB AP CHARGES (HE HISTORICAL CONVERSION): NORMAL
LEUKOCYTE ESTERASE UR QL STRIP: ABNORMAL
LEUKOCYTE ESTERASE UR QL STRIP: ABNORMAL
LYMPHOCYTES # BLD AUTO: 0.9 THOU/UL (ref 0.8–4.4)
LYMPHOCYTES # BLD AUTO: 1 THOU/UL (ref 0.8–4.4)
LYMPHOCYTES # BLD AUTO: 1 THOU/UL (ref 0.8–4.4)
LYMPHOCYTES NFR BLD AUTO: 13 % (ref 20–40)
LYMPHOCYTES NFR BLD AUTO: 14 % (ref 20–40)
LYMPHOCYTES NFR BLD AUTO: 14 % (ref 20–40)
MCH RBC QN AUTO: 32.3 PG (ref 27–34)
MCH RBC QN AUTO: 32.5 PG (ref 27–34)
MCH RBC QN AUTO: 32.6 PG (ref 27–34)
MCHC RBC AUTO-ENTMCNC: 29.8 G/DL (ref 32–36)
MCHC RBC AUTO-ENTMCNC: 30.5 G/DL (ref 32–36)
MCHC RBC AUTO-ENTMCNC: 30.7 G/DL (ref 32–36)
MCV RBC AUTO: 106 FL (ref 80–100)
MCV RBC AUTO: 107 FL (ref 80–100)
MCV RBC AUTO: 109 FL (ref 80–100)
MONOCYTES # BLD AUTO: 0.7 THOU/UL (ref 0–0.9)
MONOCYTES # BLD AUTO: 0.8 THOU/UL (ref 0–0.9)
MONOCYTES # BLD AUTO: 0.8 THOU/UL (ref 0–0.9)
MONOCYTES NFR BLD AUTO: 11 % (ref 2–10)
MONOCYTES NFR BLD AUTO: 11 % (ref 2–10)
MONOCYTES NFR BLD AUTO: 12 % (ref 2–10)
MUCOUS THREADS #/AREA URNS LPF: ABNORMAL LPF
MUCOUS THREADS #/AREA URNS LPF: ABNORMAL LPF
NEUTROPHILS # BLD AUTO: 4.6 THOU/UL (ref 2–7.7)
NEUTROPHILS # BLD AUTO: 5 THOU/UL (ref 2–7.7)
NEUTROPHILS # BLD AUTO: 5 THOU/UL (ref 2–7.7)
NEUTROPHILS NFR BLD AUTO: 68 % (ref 50–70)
NEUTROPHILS NFR BLD AUTO: 69 % (ref 50–70)
NEUTROPHILS NFR BLD AUTO: 71 % (ref 50–70)
NITRATE UR QL: NEGATIVE
NITRATE UR QL: NEGATIVE
NOROV GI+II ORF1-ORF2 JNC STL QL NAA+PR: NOT DETECTED
OVALOCYTES: ABNORMAL
PATH REPORT.COMMENTS IMP SPEC: NORMAL
PATH REPORT.COMMENTS IMP SPEC: NORMAL
PATH REPORT.FINAL DX SPEC: NORMAL
PATH REPORT.MICROSCOPIC SPEC OTHER STN: ABNORMAL
PATH REPORT.MICROSCOPIC SPEC OTHER STN: NORMAL
PATH REPORT.RELEVANT HX SPEC: NORMAL
PH UR STRIP: 6 [PH] (ref 4.5–8)
PH UR STRIP: 7 [PH] (ref 4.5–8)
PLAT MORPH BLD: NORMAL
PLATELET # BLD AUTO: 320 THOU/UL (ref 140–440)
PLATELET # BLD AUTO: 321 THOU/UL (ref 140–440)
PLATELET # BLD AUTO: 369 THOU/UL (ref 140–440)
PMV BLD AUTO: 9.3 FL (ref 8.5–12.5)
PMV BLD AUTO: 9.6 FL (ref 8.5–12.5)
PMV BLD AUTO: 9.7 FL (ref 8.5–12.5)
POTASSIUM BLD-SCNC: 4.1 MMOL/L (ref 3.5–5)
POTASSIUM BLD-SCNC: 4.3 MMOL/L (ref 3.5–5)
POTASSIUM BLD-SCNC: 4.4 MMOL/L (ref 3.5–5)
POTASSIUM BLD-SCNC: 4.5 MMOL/L (ref 3.5–5)
POTASSIUM BLD-SCNC: 4.6 MMOL/L (ref 3.5–5)
RBC # BLD AUTO: 2.12 MILL/UL (ref 4.4–6.2)
RBC # BLD AUTO: 2.18 MILL/UL (ref 4.4–6.2)
RBC # BLD AUTO: 2.23 MILL/UL (ref 4.4–6.2)
RBC #/AREA URNS AUTO: >100 HPF
RBC #/AREA URNS AUTO: ABNORMAL HPF
SODIUM SERPL-SCNC: 139 MMOL/L (ref 136–145)
SODIUM SERPL-SCNC: 140 MMOL/L (ref 136–145)
SODIUM SERPL-SCNC: 143 MMOL/L (ref 136–145)
SODIUM SERPL-SCNC: 144 MMOL/L (ref 136–145)
SODIUM SERPL-SCNC: 147 MMOL/L (ref 136–145)
SP GR UR STRIP: 1.02 (ref 1–1.03)
SP GR UR STRIP: 1.03 (ref 1–1.03)
SQUAMOUS #/AREA URNS AUTO: ABNORMAL LPF
SQUAMOUS #/AREA URNS AUTO: ABNORMAL LPF
TIBC SERPL-MCNC: 216 UG/DL (ref 313–563)
TRANSFERRIN SERPL-MCNC: 173 MG/DL (ref 212–360)
UROBILINOGEN UR STRIP-ACNC: ABNORMAL
UROBILINOGEN UR STRIP-ACNC: ABNORMAL
VIT B12 SERPL-MCNC: 375 PG/ML (ref 213–816)
WBC #/AREA URNS AUTO: >100 HPF
WBC #/AREA URNS AUTO: >100 HPF
WBC CLUMPS #/AREA URNS HPF: PRESENT /[HPF]
WBC CLUMPS #/AREA URNS HPF: PRESENT /[HPF]
WBC: 6.7 THOU/UL (ref 4–11)
WBC: 7 THOU/UL (ref 4–11)
WBC: 7.2 THOU/UL (ref 4–11)

## 2019-01-03 ENCOUNTER — COMMUNICATION - HEALTHEAST (OUTPATIENT)
Dept: CARE COORDINATION | Facility: HOSPITAL | Age: 84
End: 2019-01-03

## 2019-01-03 ENCOUNTER — AMBULATORY - HEALTHEAST (OUTPATIENT)
Dept: ADMINISTRATIVE | Facility: CLINIC | Age: 84
End: 2019-01-03

## 2020-01-01 ENCOUNTER — HOME CARE/HOSPICE - HEALTHEAST (OUTPATIENT)
Dept: HOSPICE | Facility: HOSPICE | Age: 85
End: 2020-01-01

## 2020-01-01 ENCOUNTER — OFFICE VISIT - HEALTHEAST (OUTPATIENT)
Dept: GERIATRICS | Facility: CLINIC | Age: 85
End: 2020-01-01

## 2020-01-01 DIAGNOSIS — G30.8 ALZHEIMER'S DISEASE OF OTHER ONSET WITHOUT BEHAVIORAL DISTURBANCE: ICD-10-CM

## 2020-01-01 DIAGNOSIS — F02.80 LATE ONSET ALZHEIMER'S DISEASE WITHOUT BEHAVIORAL DISTURBANCE (H): ICD-10-CM

## 2020-01-01 DIAGNOSIS — Z51.5 HOSPICE CARE PATIENT: ICD-10-CM

## 2020-01-01 DIAGNOSIS — N18.30 CKD (CHRONIC KIDNEY DISEASE) STAGE 3, GFR 30-59 ML/MIN (H): ICD-10-CM

## 2020-01-01 DIAGNOSIS — Z87.891 FORMER SMOKER: ICD-10-CM

## 2020-01-01 DIAGNOSIS — G30.1 LATE ONSET ALZHEIMER'S DISEASE WITHOUT BEHAVIORAL DISTURBANCE (H): ICD-10-CM

## 2020-01-01 DIAGNOSIS — F02.80 ALZHEIMER'S DISEASE OF OTHER ONSET WITHOUT BEHAVIORAL DISTURBANCE: ICD-10-CM

## 2020-01-01 DIAGNOSIS — R13.10 DYSPHAGIA, UNSPECIFIED TYPE: ICD-10-CM

## 2020-01-01 DIAGNOSIS — E11.3293 TYPE 2 DIABETES MELLITUS WITH BOTH EYES AFFECTED BY MILD NONPROLIFERATIVE RETINOPATHY WITHOUT MACULAR EDEMA, WITHOUT LONG-TERM CURRENT USE OF INSULIN (H): ICD-10-CM

## 2020-01-07 ENCOUNTER — AMBULATORY - HEALTHEAST (OUTPATIENT)
Dept: GERIATRICS | Facility: CLINIC | Age: 85
End: 2020-01-07

## 2021-05-24 ENCOUNTER — RECORDS - HEALTHEAST (OUTPATIENT)
Dept: ADMINISTRATIVE | Facility: CLINIC | Age: 86
End: 2021-05-24

## 2021-05-26 VITALS
RESPIRATION RATE: 16 BRPM | SYSTOLIC BLOOD PRESSURE: 110 MMHG | OXYGEN SATURATION: 94 % | DIASTOLIC BLOOD PRESSURE: 68 MMHG | HEART RATE: 68 BPM

## 2021-05-26 VITALS
HEART RATE: 80 BPM | OXYGEN SATURATION: 94 % | RESPIRATION RATE: 20 BRPM | DIASTOLIC BLOOD PRESSURE: 56 MMHG | SYSTOLIC BLOOD PRESSURE: 106 MMHG

## 2021-05-26 VITALS
SYSTOLIC BLOOD PRESSURE: 118 MMHG | HEART RATE: 76 BPM | RESPIRATION RATE: 16 BRPM | DIASTOLIC BLOOD PRESSURE: 64 MMHG | OXYGEN SATURATION: 98 %

## 2021-05-26 VITALS
HEART RATE: 78 BPM | DIASTOLIC BLOOD PRESSURE: 62 MMHG | SYSTOLIC BLOOD PRESSURE: 112 MMHG | OXYGEN SATURATION: 97 % | RESPIRATION RATE: 16 BRPM

## 2021-05-26 VITALS
DIASTOLIC BLOOD PRESSURE: 60 MMHG | HEART RATE: 87 BPM | RESPIRATION RATE: 16 BRPM | SYSTOLIC BLOOD PRESSURE: 122 MMHG | OXYGEN SATURATION: 98 %

## 2021-05-27 VITALS
SYSTOLIC BLOOD PRESSURE: 92 MMHG | HEART RATE: 82 BPM | OXYGEN SATURATION: 86 % | DIASTOLIC BLOOD PRESSURE: 66 MMHG | RESPIRATION RATE: 16 BRPM

## 2021-05-28 NOTE — PROGRESS NOTES
[facility]      Code Status:  FULL CODE  Visit Type: Review Of Multiple Medical Conditions     Facility:  [facility] [[id number]]            History of Present Illness: Mo Swift is a 84 y.o. male who is currently admitted to [facility] on the memory care unit.  He was initially admitted to the TCU as a transfer from the hospital.  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  He was noted to have an aspiration event episode while at the  center.  He underwent a Heimlich maneuver and he was then sent to the emergency room.  His wife who also helps him as his caregiver was in the TCU herself during the same time.  His children were unable to meet his needs and they recommended placement in the TCU for him also for PT OT strengthening and rehab.  He did have a bedside swallow eval with no aspiration event noted and an x-ray chest was negative. He was subsequently discharged on his current medication regimen.      While in the TCU he did quite well with mood and behaviors remain stable though he tested extremely poorly cognitively indicating moderate to severe dementia.  His initial testing slums was 1/30 and a repeat CPT was 3.3. Currently he ambulates using a 4 wheeled walker but remains a high fall risk and 24 hour supervision. Recently per nursing he has been ambulating less though, mostly wc bound now.  BS were more controlled after initiating basaglar insulin, though BS were consistently 70s in am, lantus discontinued. Previously glipizide was discontinued per worsening renal fx, though it has been restarted without renal fx recheck. BSs variable, frequently being held at noon and PM time. Today will continue to titrate metoprolol as he is declining.    Patient denies pain,  headache, chest pain, numbness or tingling, shortest of breath, eating or swallowing concerns, nausea or vomiting, diarrhea or bowel abnormalities, or no new integumentary concerns today.    Past Medical History:   Diagnosis Date     Alzheimer's dementia      ASCVD (arteriosclerotic cardiovascular disease)      CAD (coronary artery disease)      Chronic diarrhea      CKD (chronic kidney disease) stage 3, GFR 30-59 ml/min      CVA (cerebral vascular accident)     , POST REPAIR OF LEFT HIP FRACTURE, RESIDUAL LEFT SIDE WEAKNESS       DM type 2 (diabetes mellitus, type 2)      Falls 2016    with L side pain     Former smoker      Full code status 2017     Glaucoma      HLD (hyperlipidemia)      HTN (hypertension)      Legally blind      Macular degeneration      Microalbuminuria      PN (peripheral neuropathy)      Pubic bone fracture (H) 2016    L     Stenosis of right carotid artery     Right 75%       Past Surgical History:   Procedure Laterality Date     APPENDECTOMY       CAROTID ENDARTERECTOMY Right      CHOLECYSTECTOMY      Early .  Biopsy of liver.     shock tx      50 years ago fo paranoid schizophrenia     TOTAL HIP ARTHROPLASTY Left      Family History   Problem Relation Age of Onset     Dementia Mother         In stolen car.  Left out (literally) to freeze to death at 88 years.     Kidney disease Father          of kidney disease.     Other Brother         Only recently learned that he existed.  Health is unknown.     Social History     Socioeconomic History     Marital status:      Spouse name: Virginia     Number of children: Not on file     Years of education: Not on file     Highest education level: Not on file   Occupational History     Employer: RETIRED   Social Needs     Financial resource strain: Not on file     Food insecurity:     Worry: Not on file     Inability: Not on file     Transportation needs:     Medical: Not on file     Non-medical: Not on file    Tobacco Use     Smoking status: Former Smoker     Last attempt to quit: 1987     Years since quittin.9     Smokeless tobacco: Former User     Tobacco comment: Oral tobacco only briefly.  Smoked 34 years.   Substance and Sexual Activity     Alcohol use: No     Comment: He has a history of issues with alcohol.     Drug use: No     Sexual activity: Not on file   Lifestyle     Physical activity:     Days per week: Not on file     Minutes per session: Not on file     Stress: Not on file   Relationships     Social connections:     Talks on phone: Not on file     Gets together: Not on file     Attends Pentecostal service: Not on file     Active member of club or organization: Not on file     Attends meetings of clubs or organizations: Not on file     Relationship status: Not on file     Intimate partner violence:     Fear of current or ex partner: Not on file     Emotionally abused: Not on file     Physically abused: Not on file     Forced sexual activity: Not on file   Other Topics Concern     Not on file   Social History Narrative    Patient of Dr. Vela since 2016.         Used to work in the [employer].        Uses a walker.  Goes to day program at [facility] for dementia.     Current Outpatient Medications   Medication Sig Dispense Refill     cholecalciferol, vitamin D3, 1,000 unit tablet Take 2,000 Units by mouth daily.       glipiZIDE (GLUCOTROL XL) 5 MG 24 hr tablet Take 5 mg by mouth daily with breakfast.       insulin aspart U-100 (NOVOLOG) 100 unit/mL injection Inject under the skin 3 (three) times a day with meals. <200                0U    201-249         2U    250-299         4U    300-349         6U    350-399         8U    400-450        10U    451<             12U              insulin aspart U-100 (NOVOLOG) 100 unit/mL injection Inject 4 Units under the skin daily with supper. Hold for  or if not eating >50% of meal              metoprolol tartrate (LOPRESSOR) 25 MG  tablet Take 6.25 mg by mouth 2 (two) times a day. Hold for SBP <140 or HR <60             No current facility-administered medications for this visit.      No Known Allergies    Review of Systems:    Constitutional: Negative.  Negative for fever, chills, has activity change, appetite change and fatigue. Denies pain. BSs variable.  HENT: Negative for congestion and facial swelling.    Eyes: Negative for photophobia, redness and visual disturbance.   Respiratory: Negative for cough and chest tightness.    Cardiovascular: Negative for chest pain, palpitations and leg swelling.   Gastrointestinal: Negative for nausea, diarrhea, constipation, blood in stool and abdominal distention.   Genitourinary: Negative.    Musculoskeletal: Negative.    Skin: Negative.  Intact.   Neurological: Negative for dizziness, tremors, syncope, weakness, light-headedness and headaches.   Hematological: Does not bruise/bleed easily.   Psychiatric/Behavioral: Negative.  confused at baseline. Stable.    Vitals:    04/25/19 1848   BP: 123/68   Pulse: 80   Resp: 18   Temp: 98  F (36.7  C)   SpO2: 96%       Physical Exam:    GENERAL: no acute distress. Cooperative.  Denies pain.   HEENT: pupils are equal, round and reactive. Oral mucosa is moist and intact. Patient has some hearing loss and is legally blind.  RESP:Chest symmetric. Regular respiratory rate. No stridor.  Dim, RA.  CVS: RRR, S1S2, no murmur, rub, or gallop.  ABD: Nondistended, soft. No constipation or diarrhea.  EXTREMITIES: No lower extremity edema.  Left-sided weakness.  More bed bound and uses wc occasionally.   NEURO: no focal deficits. Alert and oriented x1. Confused with a very poor recall.  Declining.   PSYCH: within normal limits. No depression or anxiety.  SKIN: warm dry intact       Labs:    No results found for this or any previous visit (from the past 240 hour(s)).  Lab Results   Component Value Date    HGBA1C 8.0 (H) 12/26/2018     Results for orders placed or performed  in visit on 05/14/18   Basic Metabolic Panel   Result Value Ref Range    Sodium 138 136 - 145 mmol/L    Potassium 4.4 3.5 - 5.0 mmol/L    Chloride 105 98 - 107 mmol/L    CO2 25 22 - 31 mmol/L    Anion Gap, Calculation 8 5 - 18 mmol/L    Glucose 143 (H) 70 - 125 mg/dL    Calcium 8.6 8.5 - 10.5 mg/dL    BUN 37 (H) 8 - 28 mg/dL    Creatinine 1.81 (H) 0.70 - 1.30 mg/dL    GFR MDRD Af Amer 44 (L) >60 mL/min/1.73m2    GFR MDRD Non Af Amer 36 (L) >60 mL/min/1.73m2           Xr Chest Pa And Lateral  Result Date: 7/21/2017  XR CHEST PA AND LATERAL 7/21/2017 2:49 PM INDICATION: Aspiration COMPARISON: 7/8/2015 FINDINGS: Lung volumes are low with crowding of the pulmonary markings. No definite infiltrate or pleural effusion.    Assessment/Plan:    1.  HTN: now hypotensive, metoprolol decreased to 6.25mg BID and norvasc has been discontinued  2.  Last Cr 1.81 with GFR 36 on 5/14/18, declined in fx  3.  DM: recently glipizide has been restarted though renal fx has not been rechecked, previously dc'd lantus per hypoglycemia, continue SS three times a day with meals and discontinued novolog at noon and continue 4U at PM, hold for BS >140, last A1c 8.0 on 12/26/18  4.  Vit D def: continue D3 2000U daily, last vit D 34.1 on 12/2017.  5.  Dementia: No behaviors, stable.  6.  [facility] and [facility], no change.  7.  Acute hyperglycemia: rare for BS >250 now       Electronically signed by: Aldo Solano NP

## 2021-05-29 ENCOUNTER — RECORDS - HEALTHEAST (OUTPATIENT)
Dept: ADMINISTRATIVE | Facility: CLINIC | Age: 86
End: 2021-05-29

## 2021-05-29 NOTE — PROGRESS NOTES
[facility]      Code Status:  FULL CODE  Visit Type: Review Of Multiple Medical Conditions     Facility:  [facility] [[id number]]            History of Present Illness: Mo Swift is a 84 y.o. male who is currently admitted to LTC on the memory care unit.  He was initially admitted to the TCU as a transfer from the hospital.  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  He was noted to have an aspiration event episode while at the  center.  He underwent a Heimlich maneuver and he was then sent to the emergency room.  His wife who also helps him as his caregiver was in the TCU herself during the same time.  His children were unable to meet his needs and they recommended placement in the TCU for him also for PT OT strengthening and rehab.  He did have a bedside swallow eval with no aspiration event noted and an x-ray chest was negative. He was subsequently discharged on his current medication regimen.      He currently resides in LTC.  Recently per nursing he has been ambulating less though, mostly wc bound now.  BS are lower per decreased per lower oral intake. Previously glipizide was discontinued per worsening renal fx, though it has been restarted without renal fx recheck. Today have noticed renal fx is still decreased. Today will continue to titrate metoprolol as his Bps are lower now.    Patient denies pain, headache, chest pain, numbness or tingling, shortest of breath, eating or swallowing concerns, nausea or vomiting, diarrhea or bowel abnormalities, or no new integumentary concerns today.    Past Medical History:   Diagnosis Date     Alzheimer's dementia      ASCVD (arteriosclerotic cardiovascular disease)      CAD (coronary artery disease)      Chronic diarrhea      CKD  (chronic kidney disease) stage 3, GFR 30-59 ml/min      CVA (cerebral vascular accident)     2010, POST REPAIR OF LEFT HIP FRACTURE, RESIDUAL LEFT SIDE WEAKNESS       DM type 2 (diabetes mellitus, type 2)      Falls 2016    with L side pain     Former smoker      Full code status 2017     Glaucoma      HLD (hyperlipidemia)      HTN (hypertension)      Legally blind      Macular degeneration      Microalbuminuria      PN (peripheral neuropathy)      Pubic bone fracture (H) 2016    L     Stenosis of right carotid artery     Right 75%       Past Surgical History:   Procedure Laterality Date     APPENDECTOMY       CAROTID ENDARTERECTOMY Right      CHOLECYSTECTOMY      Early s.  Biopsy of liver.     shock tx      50 years ago fo paranoid schizophrenia     TOTAL HIP ARTHROPLASTY Left      Family History   Problem Relation Age of Onset     Dementia Mother         In stolen car.  Left out (literally) to freeze to death at 88 years.     Kidney disease Father          of kidney disease.     Other Brother         Only recently learned that he existed.  Health is unknown.     Social History     Socioeconomic History     Marital status:      Spouse name: [place]     Number of children: Not on file     Years of education: Not on file     Highest education level: Not on file   Occupational History     Employer: RETIRED   Social Needs     Financial resource strain: Not on file     Food insecurity:     Worry: Not on file     Inability: Not on file     Transportation needs:     Medical: Not on file     Non-medical: Not on file   Tobacco Use     Smoking status: Former Smoker     Last attempt to quit: 1987     Years since quittin.0     Smokeless tobacco: Former User     Tobacco comment: Oral tobacco only briefly.  Smoked 34 years.   Substance and Sexual Activity     Alcohol use: No     Comment: He has a history of issues with alcohol.     Drug use: No     Sexual activity: Not on file    Lifestyle     Physical activity:     Days per week: Not on file     Minutes per session: Not on file     Stress: Not on file   Relationships     Social connections:     Talks on phone: Not on file     Gets together: Not on file     Attends Scientologist service: Not on file     Active member of club or organization: Not on file     Attends meetings of clubs or organizations: Not on file     Relationship status: Not on file     Intimate partner violence:     Fear of current or ex partner: Not on file     Emotionally abused: Not on file     Physically abused: Not on file     Forced sexual activity: Not on file   Other Topics Concern     Not on file   Social History Narrative    Patient of Dr. Vela since 2016.         Used to work in the [employer].        Uses a walker.  Goes to day program at [facility] for dementia.     Current Outpatient Medications   Medication Sig Dispense Refill     insulin glargine (LANTUS) 100 unit/mL injection Inject 10 Units under the skin every morning.       metoprolol succinate (TOPROL-XL) 25 MG Take 6.25 mg by mouth daily. Hold for HR <60 or SBP <140       cholecalciferol, vitamin D3, 1,000 unit tablet Take 2,000 Units by mouth daily.       insulin aspart U-100 (NOVOLOG) 100 unit/mL injection Inject under the skin 3 (three) times a day with meals. <200                0U    201-249         2U    250-299         4U    300-349         6U    350-399         8U    400-450        10U    451<             12U              insulin aspart U-100 (NOVOLOG) 100 unit/mL injection Inject 4 Units under the skin daily with supper. Hold for  or if not eating >50% of meal              No current facility-administered medications for this visit.      No Known Allergies    Review of Systems:    Constitutional: Negative.  Negative for fever, chills, has activity change, appetite change and fatigue. Denies pain. BSs variable.  HENT: Negative for congestion and facial swelling.     Eyes: Negative for photophobia, redness and visual disturbance.   Respiratory: Negative for cough and chest tightness.    Cardiovascular: Negative for chest pain, palpitations and leg swelling.   Gastrointestinal: Negative for nausea, diarrhea, constipation, blood in stool and abdominal distention.   Genitourinary: Negative.    Musculoskeletal: Negative.    Skin: Negative.  Intact.   Neurological: Negative for dizziness, tremors, syncope, weakness, light-headedness and headaches.   Hematological: Does not bruise/bleed easily.   Psychiatric/Behavioral: Negative.  confused at baseline. Stable.    Vitals:    06/20/19 1641   BP: 143/69   Pulse: 67   Resp: 18   Temp: 98  F (36.7  C)   SpO2: 97%       Physical Exam:    GENERAL: no acute distress. Cooperative.  Denies pain.   HEENT: pupils are equal, round and reactive. Oral mucosa is moist and intact. Patient has some hearing loss and is legally blind.  RESP:Chest symmetric. Regular respiratory rate. No stridor.  Dim, RA.  CVS: RRR, S1S2, no murmur, rub, or gallop.  ABD: Nondistended, soft. No constipation or diarrhea.  EXTREMITIES: No lower extremity edema.  Left-sided weakness.  More bed bound and uses wc occasionally.   NEURO: no focal deficits. Alert and oriented x1. Confused with a very poor recall. Declining.   PSYCH: within normal limits. No depression or anxiety.  SKIN: warm dry intact       Labs:    No results found for this or any previous visit (from the past 240 hour(s)).  Lab Results   Component Value Date    HGBA1C 7.7 (H) 05/01/2019     Results for orders placed or performed in visit on 04/30/19   Basic Metabolic Panel   Result Value Ref Range    Sodium 140 136 - 145 mmol/L    Potassium 4.4 3.5 - 5.0 mmol/L    Chloride 105 98 - 107 mmol/L    CO2 25 22 - 31 mmol/L    Anion Gap, Calculation 10 5 - 18 mmol/L    Glucose 231 (H) 70 - 125 mg/dL    Calcium 9.0 8.5 - 10.5 mg/dL    BUN 37 (H) 8 - 28 mg/dL    Creatinine 1.80 (H) 0.70 - 1.30 mg/dL    GFR MDRD Af  Amer 44 (L) >60 mL/min/1.73m2    GFR MDRD Non Af Amer 36 (L) >60 mL/min/1.73m2           Xr Chest Pa And Lateral  Result Date: 7/21/2017  XR CHEST PA AND LATERAL 7/21/2017 2:49 PM INDICATION: Aspiration COMPARISON: 7/8/2015 FINDINGS: Lung volumes are low with crowding of the pulmonary markings. No definite infiltrate or pleural effusion.    Assessment/Plan:    1.  HTN: now hypotensive, today metoprolol changed to 6.25mg daily and norvasc has been discontinued, CTM  2.  Last Cr 1.80 with GFR 36 on 4/30/19, declined in fx  3.  DM: will dc glipizide per worsening renal fx, continue SS three times a day with meals and discontinued novolog at noon and continue 4U at PM, hold for BS >140, last A1c 7.7 on 5/1.  Restart lantus 10U in am  4.  Vit D def: continue D3 2000U daily, last vit D 34.1 on 12/2017.  5.  Dementia: No behaviors, stable.  6.  Dysphagia: Continues on mech soft and regular consistency, no change.       Electronically signed by: Aldo Solano NP

## 2021-05-30 VITALS — BODY MASS INDEX: 24.25 KG/M2 | WEIGHT: 154.8 LBS

## 2021-05-30 VITALS — HEIGHT: 67 IN | BODY MASS INDEX: 24.17 KG/M2 | WEIGHT: 154 LBS

## 2021-05-30 NOTE — PROGRESS NOTES
[facility]      Code Status:  FULL CODE  Visit Type: Review Of Multiple Medical Conditions     Facility:  [facility] [[id number]]           History of Present Illness: Mo Swift is a 84 y.o. male who is currently a resident of [facility].    He has dementia and is in a memory care unit   he tested extremely poorly cognitively indicating moderate to severe dementia.  His initial testing slums was 1/30 and a repeat CPT was 3.3/ 5.6.  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   Patient has diabetes.  Due to oral intake increasing he remains in medications recently due to kidney issues switched to Lantus 10 units for management of his blood sugars.  Also has had low blood pressures due to which his Norvasc has been discontinued and he remains on a low-dose of metoprolol.  Weights are being monitored and so far are stable no recent behavioral issues either    Past Medical History:   Diagnosis Date     Alzheimer's dementia      ASCVD (arteriosclerotic cardiovascular disease)      CAD (coronary artery disease)      Chronic diarrhea      CKD (chronic kidney disease) stage 3, GFR 30-59 ml/min      CVA (cerebral vascular accident)     2010, POST REPAIR OF LEFT HIP FRACTURE, RESIDUAL LEFT SIDE WEAKNESS       DM type 2 (diabetes mellitus, type 2)      Falls 06/02/2016    with L side pain     Former smoker      Full code status 1/22/2017     Glaucoma      HLD (hyperlipidemia)      HTN (hypertension)      Legally blind      Macular degeneration      Microalbuminuria      PN (peripheral neuropathy)      Pubic bone fracture (H) 06/02/2016    L     Stenosis of right carotid artery     Right 75%       Past Surgical History:   Procedure Laterality Date     APPENDECTOMY       CAROTID ENDARTERECTOMY Right      CHOLECYSTECTOMY      Early 30's.  Biopsy of liver.     shock tx      50 years ago fo paranoid schizophrenia     TOTAL HIP ARTHROPLASTY Left 2011     Family History   Problem  Relation Age of Onset     Dementia Mother         In stolen car.  Left out (literally) to freeze to death at 88 years.     Kidney disease Father          of kidney disease.     Other Brother         Only recently learned that he existed.  Health is unknown.     Social History     Socioeconomic History     Marital status:      Spouse name: Virginia     Number of children: Not on file     Years of education: Not on file     Highest education level: Not on file   Occupational History     Employer: RETIRED   Social Needs     Financial resource strain: Not on file     Food insecurity:     Worry: Not on file     Inability: Not on file     Transportation needs:     Medical: Not on file     Non-medical: Not on file   Tobacco Use     Smoking status: Former Smoker     Last attempt to quit: 1987     Years since quittin.1     Smokeless tobacco: Former User     Tobacco comment: Oral tobacco only briefly.  Smoked 34 years.   Substance and Sexual Activity     Alcohol use: No     Comment: He has a history of issues with alcohol.     Drug use: No     Sexual activity: Not on file   Lifestyle     Physical activity:     Days per week: Not on file     Minutes per session: Not on file     Stress: Not on file   Relationships     Social connections:     Talks on phone: Not on file     Gets together: Not on file     Attends Anabaptist service: Not on file     Active member of club or organization: Not on file     Attends meetings of clubs or organizations: Not on file     Relationship status: Not on file     Intimate partner violence:     Fear of current or ex partner: Not on file     Emotionally abused: Not on file     Physically abused: Not on file     Forced sexual activity: Not on file   Other Topics Concern     Not on file   Social History Narrative    Patient of Dr. Vela since 2016.         Used to work in the [employer].        Uses a walker.  Goes to day program at [facility] for dementia.      Current Outpatient Medications   Medication Sig Dispense Refill     cholecalciferol, vitamin D3, 1,000 unit tablet Take 2,000 Units by mouth daily.       insulin aspart U-100 (NOVOLOG) 100 unit/mL injection Inject under the skin 3 (three) times a day with meals. <200                0U    201-249         2U    250-299         4U    300-349         6U    350-399         8U    400-450        10U    451<             12U              insulin aspart U-100 (NOVOLOG) 100 unit/mL injection Inject 4 Units under the skin daily with supper. Hold for  or if not eating >50% of meal              insulin glargine (LANTUS) 100 unit/mL injection Inject 10 Units under the skin every morning.       metoprolol succinate (TOPROL-XL) 25 MG Take 6.25 mg by mouth daily. Hold for HR <60 or SBP <140       No current facility-administered medications for this visit.      No Known Allergies      Review of Systems:    Constitutional: Negative.  Negative for fever, chills, has activity change, appetite change and fatigue.   HENT: Negative for congestion and facial swelling.    Eyes: Negative for photophobia, redness and visual disturbance.   Respiratory: Negative for cough and chest tightness.    Cardiovascular: Negative for chest pain, palpitations and leg swelling.   Gastrointestinal: Negative for nausea, diarrhea, constipation, blood in stool and abdominal distention.   Genitourinary: Negative.    Musculoskeletal: Negative.    Has a fall recently reported by nursing also had ecchymosis of his wrist  Skin: Negative.    Neurological: Negative for dizziness, tremors, syncope, weakness, light-headedness and headaches.   Hematological: Does not bruise/bleed easily.   Psychiatric/Behavioral: Negative.  confused    Physical Exam:    Pressure  127 /65 temp 98 pulse 72 weight is 145 pound  GENERAL: no acute distress. NON VERBAL; NON cooperative in conversation.   HEENT: pupils are equal, round and reactive. Oral mucosa is moist and  intact.  Patient has some hearing loss and is legally blind  RESP:Chest symmetric. Regular respiratory rate. No stridor.  CVS: S1S2  ABD: Nondistended, soft.  EXTREMITIES: No lower extremity edema.  Left-sided weakness with some spasticity of his hand noted uses a walker for ambulation  NEURO: non focal. Alert and oriented xself. Confused with a very poor recall does not follow any commands any longer  PSYCH: within normal limits. No depression or anxiety.  SKIN: warm dry intact     Labs:      Lab Results   Component Value Date    HGBA1C 7.7 (H) 05/01/2019     Results for orders placed or performed in visit on 04/30/19   Basic Metabolic Panel   Result Value Ref Range    Sodium 140 136 - 145 mmol/L    Potassium 4.4 3.5 - 5.0 mmol/L    Chloride 105 98 - 107 mmol/L    CO2 25 22 - 31 mmol/L    Anion Gap, Calculation 10 5 - 18 mmol/L    Glucose 231 (H) 70 - 125 mg/dL    Calcium 9.0 8.5 - 10.5 mg/dL    BUN 37 (H) 8 - 28 mg/dL    Creatinine 1.80 (H) 0.70 - 1.30 mg/dL    GFR MDRD Af Amer 44 (L) >60 mL/min/1.73m2    GFR MDRD Non Af Amer 36 (L) >60 mL/min/1.73m2       .  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  He has continued to decline.  Cognitive scores remain poor with a last CPT of 3.3.  Due to renal impairment he has switched him to Lantus 10 units we will continue to monitor blood sugars and adjust accordingly.  Due to low blood pressures he remains on a lower dose of metoprolol.  Norvasc has been discontinued.  Overall remains wheelchair-bound with profound dementia  wts are stable    Electronically  signed by: ADELINE Baptiste  This progress note was completed using Dragon software and there may be grammatical errors.

## 2021-05-31 ENCOUNTER — RECORDS - HEALTHEAST (OUTPATIENT)
Dept: ADMINISTRATIVE | Facility: CLINIC | Age: 86
End: 2021-05-31

## 2021-05-31 VITALS — WEIGHT: 146.6 LBS | BODY MASS INDEX: 25.97 KG/M2

## 2021-05-31 VITALS — BODY MASS INDEX: 25.86 KG/M2 | WEIGHT: 146 LBS

## 2021-05-31 VITALS — WEIGHT: 143.8 LBS | BODY MASS INDEX: 25.47 KG/M2

## 2021-05-31 VITALS — WEIGHT: 153.4 LBS | BODY MASS INDEX: 27.17 KG/M2

## 2021-05-31 VITALS — HEIGHT: 67 IN | WEIGHT: 156 LBS | BODY MASS INDEX: 24.48 KG/M2

## 2021-05-31 VITALS — BODY MASS INDEX: 27.1 KG/M2 | WEIGHT: 153 LBS

## 2021-05-31 VITALS — WEIGHT: 148 LBS | BODY MASS INDEX: 26.22 KG/M2

## 2021-06-01 ENCOUNTER — RECORDS - HEALTHEAST (OUTPATIENT)
Dept: ADMINISTRATIVE | Facility: CLINIC | Age: 86
End: 2021-06-01

## 2021-06-01 VITALS — BODY MASS INDEX: 25.33 KG/M2 | WEIGHT: 143 LBS

## 2021-06-01 VITALS — BODY MASS INDEX: 25.51 KG/M2 | WEIGHT: 144 LBS

## 2021-06-01 VITALS — WEIGHT: 144 LBS | BODY MASS INDEX: 25.51 KG/M2

## 2021-06-01 VITALS — BODY MASS INDEX: 24.98 KG/M2 | WEIGHT: 141 LBS

## 2021-06-01 VITALS — BODY MASS INDEX: 26.75 KG/M2 | WEIGHT: 151 LBS

## 2021-06-01 VITALS — BODY MASS INDEX: 25.69 KG/M2 | WEIGHT: 145 LBS

## 2021-06-01 VITALS — WEIGHT: 145 LBS | BODY MASS INDEX: 25.69 KG/M2

## 2021-06-01 VITALS — WEIGHT: 143 LBS | BODY MASS INDEX: 25.33 KG/M2

## 2021-06-02 VITALS — BODY MASS INDEX: 25.86 KG/M2 | WEIGHT: 146 LBS

## 2021-06-02 VITALS — WEIGHT: 147 LBS | BODY MASS INDEX: 26.04 KG/M2

## 2021-06-02 VITALS — BODY MASS INDEX: 25.33 KG/M2 | WEIGHT: 143 LBS

## 2021-06-02 VITALS — BODY MASS INDEX: 26.75 KG/M2 | WEIGHT: 151 LBS

## 2021-06-02 VITALS — WEIGHT: 142 LBS | BODY MASS INDEX: 25.15 KG/M2

## 2021-06-02 NOTE — PROGRESS NOTES
[facility]      Code Status:  FULL CODE  Visit Type: Review Of Multiple Medical Conditions     Facility:  [facility] [[id number]]            History of Present Illness: Mo Swift is a 84 y.o. male who is currently admitted to LTC on the memory care unit.  He was initially admitted to the TCU as a transfer from the hospital.  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  He was noted to have an aspiration event episode while at the  center.  He underwent a Heimlich maneuver and he was then sent to the emergency room.  His wife who also helps him as his caregiver was in the TCU herself during the same time.  His children were unable to meet his needs and they recommended placement in the TCU for him also for PT OT strengthening and rehab.  He did have a bedside swallow eval with no aspiration event noted and an x-ray chest was negative. He was subsequently discharged on his current medication regimen.      LTC:  He currently resides in LTC.  He mostly wc bound now and declining physically/mentally.  BSs have been increasing especially afternoon. Previously glipizide was discontinued per worsening renal fx, was restarted, though  discontinued again per renal fx. Also previously titrated metoprolol per hypotension. Today no change.      Past Medical History:   Diagnosis Date     Alzheimer's dementia      ASCVD (arteriosclerotic cardiovascular disease)      CAD (coronary artery disease)      Chronic diarrhea      CKD (chronic kidney disease) stage 3, GFR 30-59 ml/min      CVA (cerebral vascular accident)     2010, POST REPAIR OF LEFT HIP FRACTURE, RESIDUAL LEFT SIDE WEAKNESS       DM type 2 (diabetes mellitus, type 2)      Falls 06/02/2016    with L side pain     Former smoker       Full code status 2017     Glaucoma      HLD (hyperlipidemia)      HTN (hypertension)      Legally blind      Macular degeneration      Microalbuminuria      PN (peripheral neuropathy)      Pubic bone fracture (H) 2016    L     Stenosis of right carotid artery     Right 75%       Past Surgical History:   Procedure Laterality Date     APPENDECTOMY       CAROTID ENDARTERECTOMY Right      CHOLECYSTECTOMY      Early 30s.  Biopsy of liver.     shock tx      50 years ago fo paranoid schizophrenia     TOTAL HIP ARTHROPLASTY Left      Family History   Problem Relation Age of Onset     Dementia Mother         In stolen car.  Left out (literally) to freeze to death at 88 years.     Kidney disease Father          of kidney disease.     Other Brother         Only recently learned that he existed.  Health is unknown.     Social History     Socioeconomic History     Marital status:      Spouse name: [place]     Number of children: Not on file     Years of education: Not on file     Highest education level: Not on file   Occupational History     Employer: RETIRED   Social Needs     Financial resource strain: Not on file     Food insecurity:     Worry: Not on file     Inability: Not on file     Transportation needs:     Medical: Not on file     Non-medical: Not on file   Tobacco Use     Smoking status: Former Smoker     Last attempt to quit: 1987     Years since quittin.3     Smokeless tobacco: Former User     Tobacco comment: Oral tobacco only briefly.  Smoked 34 years.   Substance and Sexual Activity     Alcohol use: No     Comment: He has a history of issues with alcohol.     Drug use: No     Sexual activity: Not on file   Lifestyle     Physical activity:     Days per week: Not on file     Minutes per session: Not on file     Stress: Not on file   Relationships     Social connections:     Talks on phone: Not on file     Gets together: Not on file     Attends Yazdanism service: Not on file      Active member of club or organization: Not on file     Attends meetings of clubs or organizations: Not on file     Relationship status: Not on file     Intimate partner violence:     Fear of current or ex partner: Not on file     Emotionally abused: Not on file     Physically abused: Not on file     Forced sexual activity: Not on file   Other Topics Concern     Not on file   Social History Narrative    Patient of Dr. Vela since 2016.         Used to work in the [employer].        Uses a walker.  Goes to day program at [facility] for dementia.     Current Outpatient Medications   Medication Sig Dispense Refill     cholecalciferol, vitamin D3, 1,000 unit tablet Take 2,000 Units by mouth daily.       insulin aspart U-100 (NOVOLOG) 100 unit/mL injection Inject under the skin 3 (three) times a day with meals. <200                0U    201-249         2U    250-299         4U    300-349         6U    350-399         8U    400-450        10U    451<             12U              insulin aspart U-100 (NOVOLOG) 100 unit/mL injection Inject 4 Units under the skin daily with supper. Hold for  or if not eating >50% of meal              insulin glargine (LANTUS) 100 unit/mL injection Inject 15 Units under the skin every morning.              metoprolol succinate (TOPROL-XL) 25 MG Take 6.25 mg by mouth daily. Hold for HR <60 or SBP <140       No current facility-administered medications for this visit.      No Known Allergies    Review of Systems:    Constitutional: Negative.  Negative for fever, chills, has activity change, appetite change and fatigue. Denies pain. BSs variable.  HENT: Negative for congestion and facial swelling.    Eyes: Negative for photophobia, redness and visual disturbance.   Respiratory: Negative for cough and chest tightness.    Cardiovascular: Negative for chest pain, palpitations and leg swelling.   Gastrointestinal: Negative for nausea, diarrhea, constipation, blood in  stool and abdominal distention.   Genitourinary: Negative.    Musculoskeletal: Negative.    Skin: Chronic coccyx PU   Neurological: Negative for dizziness, tremors, syncope, weakness, light-headedness and headaches.   Hematological: Does not bruise/bleed easily.   Psychiatric/Behavioral: Negative.  confused at baseline. Stable.    Vitals:    10/14/19 1225   BP: 134/68   Pulse: 69   Resp: 20   Temp: 97  F (36.1  C)       Physical Exam:    GENERAL: no acute distress. Cooperative.  Denies pain.   HEENT: pupils are equal, round and reactive. Oral mucosa is moist and intact. Patient has some hearing loss and is legally blind.  RESP:Chest symmetric. Regular respiratory rate. No stridor.  Dim, RA.  CVS: RRR, S1S2, no murmur, rub, or gallop.  ABD: Nondistended, soft. No constipation or diarrhea.  EXTREMITIES: No lower extremity edema.  Left-sided weakness.  More bed bound and uses wc occasionally.   NEURO: no focal deficits. Alert and oriented x1. Confused with a very poor recall. Declining.   PSYCH: within normal limits. No depression or anxiety.  SKIN: chronic coccyx PU      Labs:    No results found for this or any previous visit (from the past 240 hour(s)).  Lab Results   Component Value Date    HGBA1C 7.7 (H) 05/01/2019     Results for orders placed or performed in visit on 04/30/19   Basic Metabolic Panel   Result Value Ref Range    Sodium 140 136 - 145 mmol/L    Potassium 4.4 3.5 - 5.0 mmol/L    Chloride 105 98 - 107 mmol/L    CO2 25 22 - 31 mmol/L    Anion Gap, Calculation 10 5 - 18 mmol/L    Glucose 231 (H) 70 - 125 mg/dL    Calcium 9.0 8.5 - 10.5 mg/dL    BUN 37 (H) 8 - 28 mg/dL    Creatinine 1.80 (H) 0.70 - 1.30 mg/dL    GFR MDRD Af Amer 44 (L) >60 mL/min/1.73m2    GFR MDRD Non Af Amer 36 (L) >60 mL/min/1.73m2           Xr Chest Pa And Lateral  Result Date: 7/21/2017  XR CHEST PA AND LATERAL 7/21/2017 2:49 PM INDICATION: Aspiration COMPARISON: 7/8/2015 FINDINGS: Lung volumes are low with crowding of the pulmonary  markings. No definite infiltrate or pleural effusion.    Assessment/Plan:    1.  HTN: now hypotensive, continue metoprolol 6.25mg daily, norvasc has been discontinued, CTM  2.  Last Cr 1.80 with GFR 36 on 4/30/19, declined in fx  3.  DM: will dc glipizide per worsening renal fx, continue SS three times a day with meals and discontinued novolog at noon and continue 4U at PM, hold for BS >140, last A1c 7.7 on 5/2019. Plan to increase lantus to 15U in am if recheck A1c 8.5<  4.  Vit D def: continue D3 2000U daily, last vit D 34.1 on 12/2017.  5.  Dementia: No behaviors, stable.  6.  Dysphagia: Continues on mech soft and regular consistency, no change.       Electronically signed by: Aldo Solano NP

## 2021-06-03 VITALS — BODY MASS INDEX: 25.86 KG/M2 | WEIGHT: 146 LBS

## 2021-06-03 VITALS — BODY MASS INDEX: 25.69 KG/M2 | WEIGHT: 145 LBS

## 2021-06-03 VITALS
DIASTOLIC BLOOD PRESSURE: 68 MMHG | TEMPERATURE: 97 F | RESPIRATION RATE: 20 BRPM | WEIGHT: 123 LBS | BODY MASS INDEX: 21.79 KG/M2 | HEART RATE: 69 BPM | SYSTOLIC BLOOD PRESSURE: 134 MMHG

## 2021-06-03 NOTE — PROGRESS NOTES
[facility] Admission note      Patient: Mo Swift  MRN: [id number]  Date of Service: 2019      [facility] [[id number]]  Reason for Visit     Chief Complaint   Patient presents with     H & P       Code Status     DNR    Assessment     -Advanced Alzheimer's dementia end-stage  -Chronic anemia severe with work-up being negative for any GI losses  -Hypertension with hypotension on a low-dose of metoprolol with hold parameters  -Diabetes type 2 on Lantus on a lower dose of 10 units due to hypoglycemia noted with blood sugars in the 60s.  -Legally blind  - RICHARD/ CKD-3 with elevated creatinines noted on admission secondary to poor oral intake  -Dehydration secondary to declining oral intake  -History of CAD  -History of CVA  -Generalized debilitation    Plan     Patient has been readmitted to long-term care on hospice cares.  Family is comfortable with the decision understanding his progressive cognitive and physical decline.  He was examined the presence of his daughter who also requested to meet with me and care plan was reviewed with her in detail.  Family is actually requested hospitalization.  They have some issues as per the daughter with care given and are quite upset with the staff.  They wanted to be reassured that there was nothing reversible in their father's dementia.  At baseline he is nonverbal does not answer questions appropriately and has incontinence.  He was also noted to have a sacral pressure stage II.  He is nonambulatory and using a wheelchair family's aware of these decline.  As per his daughter present at bedside she plans to visit him daily in the afternoon to continue with intellectual stimulation for her father because she feels that is what is leading to his decline.  Please note that his wife  2 months ago and family attributes his rapid decline to this.  He continues on metoprolol as well as insulin for now.  Blood sugars are stable in the  morning postprandials are generally in the 300-400 range we will monitor trends before making any adjustment in the patient with advanced dementia and hospice cares  Pain management reviewed both with him as well as daughter present at bedside they do not think he has any pain issues and are comfortable with no medications for that.  As per his daughter his oral intake has been quite good staff is been trying to push some fluids also.  Hospice care management was reviewed she initially had requested full code but now has elected a DNR CODE STATUS for her father.  She is aware of his sacral pressure sore and will monitor this closely as per her family remains actively involved in his care currently and also plans to have their previous primary care physician reviewing his medications and charts quite closely.  They had several concerns about staff and those were reviewed with him.  Overall however they are happy with their father's care and I have encouraged him to discuss this with me as well as nursing manager if this is ongoing concern  Daughter did notify me that family had not been visiting dad as often as they would like because of some ongoing concerns and conflicts going on with there have brothers but now that their mother has passed and they suspect the brothers will not be visiting their father anymore the daughter is planning to be present almost daily.  She will not attend even a care conference on the phone if her brothers are going to be present as per her  Total time spent is 45 minutes more than 35 minutes spent face-to-face talking to the patient in the presence of his daughter  Care plan including comfort measures on hospice cares diabetes management and anemia management were all reviewed with family including goals of care and certain care issues to have with the staff as outlined in my note above.  Family is aware of his cognitive and physical decline and plan to be more involved in his care  hospice would also be an additional support.  They know that Mr. Swift remains almost nonverbal due to advanced dementia and has significant confusion    History     Patient is a very pleasant 85 y.o. male who is admitted to LTC  Patient has been admitted with advanced end-stage Alzheimer's dementia.  He has been in the nursing home memory care unit due to dementia and this has been progressive with decreasing oral intake and patient becoming progressively less interactive and verbal.  Recently 2 months ago since his wife  he has had more progressive decline.  His daughter present at his bedside provided history and stated that that suspect that this could be due to lack of intellectual stimulation and family plans to visit him daily in the hope that this will keep him stimulated.  He also has a history of severe anemia recheck hemoglobin in the nursing home was 6.9 recheck in the hospital were stable at around 7 this was felt to be chronic anemia  Stool work-up was negative for any GI bleeding and they were guaiac negative.  This was felt to be chronic anemia and work-up has been deferred.  There is also history of diabetes he admitted with a blood sugar of 400 however blood sugars were low in the 60s in the hospital and he is on a low-dose of Lantus at 10 units.  He also has a history of hypertension metoprolol dosage was reduced due to low blood pressures.  Family is quite involved in his care and have elected to put him on hospice cares due to progressive decline both physically and cognitively  He also had acute renal insufficiency with elevated creatinine on admission which was felt to be secondary to dehydration this was corrected with IV fluid hydration    Past Medical History     Active Ambulatory (Non-Hospital) Problems    Diagnosis     Anemia     Acute kidney injury (H)     Acute cystitis without hematuria     Dysphagia     Vitamin D deficiency     Aspiration into airway     Aspiration of food      Full code status     Microalbuminuria     Alzheimer's dementia (H)     ASCVD (arteriosclerotic cardiovascular disease)     Legally blind     Former smoker     Stenosis of right carotid artery     HLD (hyperlipidemia)     Alzheimer Disease     HTN (hypertension)     Stroke - multiple lacunar infarcts (CT of 4/12/16) with LLE residual weakness per daughter.  4/29/16     CKD (chronic kidney disease) stage 3, GFR 30-59 ml/min (H)     PN (peripheral neuropathy)     DM type 2 (diabetes mellitus, type 2) (H)     Past Medical History:   Diagnosis Date     Alzheimer's dementia (H)      ASCVD (arteriosclerotic cardiovascular disease)      CAD (coronary artery disease)      Chronic diarrhea      CKD (chronic kidney disease) stage 3, GFR 30-59 ml/min      CVA (cerebral vascular accident)      DM type 2 (diabetes mellitus, type 2)      Falls 06/02/2016     Former smoker      Full code status 1/22/2017     Glaucoma      HLD (hyperlipidemia)      HTN (hypertension)      Legally blind      Macular degeneration      Microalbuminuria      PN (peripheral neuropathy)      Pubic bone fracture (H) 06/02/2016     Stenosis of right carotid artery        Past Social History     Reviewed, and he  reports that he quit smoking about 32 years ago. He has quit using smokeless tobacco. He reports that he does not drink alcohol or use drugs.    Family History     Reviewed, and family history includes Dementia in his mother; Kidney disease in his father; Other in his brother.    Medication List   Post Discharge Medication Reconciliation Status: discharge medications reconciled, continue medications without change   Current Outpatient Medications on File Prior to Visit   Medication Sig Dispense Refill     acetaminophen (TYLENOL) 325 MG tablet Take 2 tablets (650 mg total) by mouth every 4 (four) hours as needed.  0     cholecalciferol, vitamin D3, 1,000 unit tablet Take 2,000 Units by mouth daily.       insulin aspart U-100 (NOVOLOG) 100 unit/mL  injection Inject under the skin 3 (three) times a day with meals. <200                0U    201-249         2U    250-299         4U    300-349         6U    350-399         8U    400-450        10U    451<             12U              insulin aspart U-100 (NOVOLOG) 100 unit/mL injection Inject 4 Units under the skin daily with supper. Hold for  or if not eating >50% of meal              insulin glargine (LANTUS) 100 unit/mL injection Inject 10 Units under the skin every morning.        metoprolol succinate (TOPROL-XL) 25 MG Take 6.25 mg by mouth daily. Hold for HR <60 or SBP <140       omeprazole (PRILOSEC) 20 MG capsule Take 1 capsule (20 mg total) by mouth daily before breakfast.  0     No current facility-administered medications on file prior to visit.        Allergies     No Known Allergies    Review of Systems   A comprehensive review of 14 systems was done. Pertinent findings noted here and in history of present illness. All the rest negative.  Constitutional: Negative.  Negative for fever, chills,has  activity change, appetite change and fatigue.   HENT: Negative for congestion and facial swelling.    Eyes: Negative for photophobia, redness and visual disturbance.   Respiratory: Negative for cough and chest tightness.    Cardiovascular: Negative for chest pain, palpitations and leg swelling.   Gastrointestinal: Negative for nausea, diarrhea, constipation, blood in stool and abdominal distention.   Genitourinary: Negative.    Musculoskeletal: Negative.  WC bound  Skin: Negative.    Neurological: Negative for dizziness, tremors, syncope, weakness, light-headedness and headaches.   Hematological: Does not bruise/bleed easily.   Psychiatric/Behavioral: Negative.  Confused; speech is nonsensical      Physical Exam     Recent Vitals 12/1/2019   Weight 132 lbs   BSA (m2) 1.63 m2   BP 94/50   Pulse 92   Temp -   Temp src -   SpO2 97   Some recent data might be hidden       Constitutional: Oriented to person,   and appears well-developed.   HEENT:  Normocephalic and atraumatic.  Eyes: Conjunctivae and EOM are normal. Pupils are equal, round, and reactive to light. No discharge.  No scleral icterus. Nose normal. Mouth/Throat: Oropharynx is clear and moist. No oropharyngeal exudate.    Pt is legally blind  NECK: Normal range of motion. Neck supple. No JVD present. No tracheal deviation present. No thyromegaly present.   CARDIOVASCULAR: Normal rate, regular rhythm and intact distal pulses.  Exam reveals no gallop and no friction rub.  Systolic murmur present.  PULMONARY: Effort normal and breath sounds normal. No respiratory distress.No Wheezing or rales.  ABDOMEN: Soft. Bowel sounds are normal. No distension and no mass.  There is no tenderness. There is no rebound and no guarding. No HSM.  MUSCULOSKELETAL: Normal range of motion. No edema and no tenderness. Mild kyphosis, no tenderness.  LYMPH NODES: Has no cervical, supraclavicular, axillary and groin adenopathy.   NEUROLOGICAL: Alert and oriented to person, . No cranial nerve deficit.  Normal muscle tone. Coordination normal.   GENITOURINARY: Deferred exam.  SKIN: Skin is warm and dry. No rash noted. No erythema.  pallor noted on exam.   EXTREMITIES: No cyanosis, no clubbing, no edema. No Deformity.  PSYCHIATRIC: Normal mood, affect and behavior.  Recall is completely impaired and answers are nonsensical      Lab Results     Recent Results (from the past 240 hour(s))   HM2(CBC w/o Differential)    Collection Time: 11/26/19  5:51 PM   Result Value Ref Range    WBC 6.3 4.0 - 11.0 thou/uL    RBC 2.22 (L) 4.40 - 6.20 mill/uL    Hemoglobin 7.2 (L) 14.0 - 18.0 g/dL    Hematocrit 23.5 (L) 40.0 - 54.0 %     (H) 80 - 100 fL    MCH 32.4 27.0 - 34.0 pg    MCHC 30.6 (L) 32.0 - 36.0 g/dL    RDW 17.2 (H) 11.0 - 14.5 %    Platelets 368 140 - 440 thou/uL    MPV 9.1 8.5 - 12.5 fL   Basic Metabolic Panel    Collection Time: 11/26/19  5:51 PM   Result Value Ref Range    Sodium 137  136 - 145 mmol/L    Potassium 4.7 3.5 - 5.0 mmol/L    Chloride 104 98 - 107 mmol/L    CO2 22 22 - 31 mmol/L    Anion Gap, Calculation 11 5 - 18 mmol/L    Glucose 262 (H) 70 - 125 mg/dL    Calcium 8.7 8.5 - 10.5 mg/dL    BUN 50 (H) 8 - 28 mg/dL    Creatinine 2.32 (H) 0.70 - 1.30 mg/dL    GFR MDRD Af Amer 33 (L) >60 mL/min/1.73m2    GFR MDRD Non Af Amer 27 (L) >60 mL/min/1.73m2   Hepatic Profile    Collection Time: 11/26/19  5:51 PM   Result Value Ref Range    Bilirubin, Total 0.2 0.0 - 1.0 mg/dL    Bilirubin, Direct <0.1 <=0.5 mg/dL    Protein, Total 6.6 6.0 - 8.0 g/dL    Albumin 2.6 (L) 3.5 - 5.0 g/dL    Alkaline Phosphatase 104 45 - 120 U/L    AST 9 0 - 40 U/L    ALT <9 0 - 45 U/L   APTT    Collection Time: 11/26/19  5:51 PM   Result Value Ref Range    PTT 33 24 - 37 seconds   INR    Collection Time: 11/26/19  5:51 PM   Result Value Ref Range    INR 1.06 0.90 - 1.10   ECG 12 lead nursing unit performed    Collection Time: 11/26/19  5:51 PM   Result Value Ref Range    SYSTOLIC BLOOD PRESSURE      DIASTOLIC BLOOD PRESSURE      VENTRICULAR RATE 78 BPM    ATRIAL RATE 78 BPM    P-R INTERVAL 208 ms    QRS DURATION 80 ms    Q-T INTERVAL 360 ms    QTC CALCULATION (BEZET) 410 ms    P Axis 67 degrees    R AXIS 1 degrees    T AXIS 53 degrees    MUSE DIAGNOSIS       Normal sinus rhythm  Possible Inferior infarct , age undetermined  Abnormal ECG  When compared with ECG of 02-JUN-2016 21:47,  DC interval has decreased  Possible Inferior infarct is now Present  Confirmed by BENI ARTEAGA, APOLONIA LOC:SJ ([id number]) on 11/28/2019 9:56:19 AM     Type and Screen    Collection Time: 11/26/19  6:01 PM   Result Value Ref Range    ABORh A POS     Antibody Screen Negative Negative   Urinalysis-UC if Indicated    Collection Time: 11/26/19  6:26 PM   Result Value Ref Range    Color, UA Yellow Colorless, Yellow, Straw, Light Yellow    Clarity, UA Cloudy (!) Clear    Glucose,  mg/dL (!) Negative    Bilirubin, UA Negative Negative    Ketones,  UA Negative Negative    Specific Gravity, UA 1.010 1.001 - 1.030    Blood, UA Small (!) Negative    pH, UA 6.0 4.5 - 8.0    Protein,  mg/dL (!) Negative mg/dL    Urobilinogen, UA <2.0 E.U./dL <2.0 E.U./dL, 2.0 E.U./dL    Nitrite, UA Negative Negative    Leukocytes, UA Large (!) Negative    Bacteria, UA Many (!) None Seen hpf    RBC, UA 5-10 (!) None Seen, 0-2 hpf    WBC, UA >100 (!) None Seen, 0-5 hpf    Squam Epithel, UA 25-50 (!) None Seen, 0-5 lpf    WBC Clumps Present (!) None Seen    Mucus, UA Few (!) None Seen lpf   Culture, Urine    Collection Time: 11/26/19  6:26 PM   Result Value Ref Range    Culture >100,000 col/ml ESBL producing Escherichia coli (!)        Susceptibility    ESBL producing Escherichia coli - GEORGE     Amoxicillin + Clavulanate 8/4 Sensitive      Ampicillin >16 Resistant      Cefazolin >16 Resistant      Cefepime 16 Resistant      Ceftriaxone >32 Resistant      Ciprofloxacin >2 Resistant      Gentamicin <=2 Sensitive      Levofloxacin >4 Resistant      Meropenem <=0.25 Sensitive      Nitrofurantoin 32 Sensitive      Tetracycline >8 Resistant      Tobramycin <=2 Sensitive      Trimethoprim + Sulfamethoxazole >2/38 Resistant    POCT occult blood stool    Collection Time: 11/26/19  6:27 PM   Result Value Ref Range    POC Fecal Occult Bld Negative Negative    Lot Number [id number]     Expiration Date 12/21     Diluent/Developer Lot Number [id number]     Diluent/Developer Expiration Date 2021/7     Pos Control Valid Control Valid Control    Neg Control Valid Control Valid Control   POCT Glucose    Collection Time: 11/26/19  6:31 PM   Result Value Ref Range    Glucose 215 (H) 70 - 139 mg/dL   POCT Glucose    Collection Time: 11/26/19  8:17 PM   Result Value Ref Range    Glucose 63 (L) 70 - 139 mg/dL   POCT Glucose    Collection Time: 11/26/19  8:44 PM   Result Value Ref Range    Glucose 60 (L) 70 - 139 mg/dL   POCT Glucose    Collection Time: 11/26/19  9:05 PM   Result Value Ref Range    Glucose 86 70  - 139 mg/dL   POCT Glucose    Collection Time: 11/26/19 10:27 PM   Result Value Ref Range    Glucose 129 70 - 139 mg/dL   POCT Glucose    Collection Time: 11/27/19  3:35 AM   Result Value Ref Range    Glucose 154 (H) 70 - 139 mg/dL   POCT Glucose    Collection Time: 11/27/19  6:17 AM   Result Value Ref Range    Glucose 158 (H) 70 - 139 mg/dL   Basic metabolic panel    Collection Time: 11/27/19  6:21 AM   Result Value Ref Range    Sodium 138 136 - 145 mmol/L    Potassium 4.6 3.5 - 5.0 mmol/L    Chloride 107 98 - 107 mmol/L    CO2 22 22 - 31 mmol/L    Anion Gap, Calculation 9 5 - 18 mmol/L    Glucose 155 (H) 70 - 125 mg/dL    Calcium 8.5 8.5 - 10.5 mg/dL    BUN 39 (H) 8 - 28 mg/dL    Creatinine 1.87 (H) 0.70 - 1.30 mg/dL    GFR MDRD Af Amer 42 (L) >60 mL/min/1.73m2    GFR MDRD Non Af Amer 34 (L) >60 mL/min/1.73m2   Hemoglobin    Collection Time: 11/27/19  6:21 AM   Result Value Ref Range    Hemoglobin 7.1 (L) 14.0 - 18.0 g/dL   Vitamin B12    Collection Time: 11/27/19  6:21 AM   Result Value Ref Range    Vitamin B-12 548 213 - 816 pg/mL   Reticulocytes    Collection Time: 11/27/19  6:21 AM   Result Value Ref Range    Retic Absolute Count 0.069 0.010 - 0.110 mill/uL    Retic Ct Pct 3.10 (H) 0.8 - 2.7 %   POCT Glucose    Collection Time: 11/27/19 11:22 AM   Result Value Ref Range    Glucose 185 (H) 70 - 139 mg/dL   Folate, Serum    Collection Time: 11/27/19  1:13 PM   Result Value Ref Range    Folate 7.9 >=3.5 ng/mL   POCT Glucose    Collection Time: 11/27/19  5:46 PM   Result Value Ref Range    Glucose 192 (H) 70 - 139 mg/dL   POCT Glucose    Collection Time: 11/27/19  9:06 PM   Result Value Ref Range    Glucose 282 (H) 70 - 139 mg/dL   POCT Glucose    Collection Time: 11/28/19  6:11 AM   Result Value Ref Range    Glucose 107 70 - 139 mg/dL   Iron    Collection Time: 11/28/19  6:57 AM   Result Value Ref Range    Iron 39 (L) 42 - 175 ug/dL   Basic Metabolic Panel    Collection Time: 11/28/19  6:57 AM   Result Value Ref  Range    Sodium 140 136 - 145 mmol/L    Potassium 4.5 3.5 - 5.0 mmol/L    Chloride 109 (H) 98 - 107 mmol/L    CO2 21 (L) 22 - 31 mmol/L    Anion Gap, Calculation 10 5 - 18 mmol/L    Glucose 102 70 - 125 mg/dL    Calcium 8.4 (L) 8.5 - 10.5 mg/dL    BUN 34 (H) 8 - 28 mg/dL    Creatinine 1.59 (H) 0.70 - 1.30 mg/dL    GFR MDRD Af Amer 50 (L) >60 mL/min/1.73m2    GFR MDRD Non Af Amer 42 (L) >60 mL/min/1.73m2   Hemoglobin    Collection Time: 11/28/19  6:57 AM   Result Value Ref Range    Hemoglobin 7.4 (L) 14.0 - 18.0 g/dL   POCT Glucose    Collection Time: 11/28/19 11:20 AM   Result Value Ref Range    Glucose 163 (H) 70 - 139 mg/dL   POCT Glucose    Collection Time: 11/28/19  4:41 PM   Result Value Ref Range    Glucose 169 (H) 70 - 139 mg/dL   POCT Glucose    Collection Time: 11/28/19  9:39 PM   Result Value Ref Range    Glucose 261 (H) 70 - 139 mg/dL   POCT Glucose    Collection Time: 11/29/19  6:59 AM   Result Value Ref Range    Glucose 81 70 - 139 mg/dL   POCT Glucose    Collection Time: 11/29/19 12:00 PM   Result Value Ref Range    Glucose 105 70 - 139 mg/dL            Imaging Results     No results found.          ADELINE Baptiste

## 2021-06-04 VITALS
HEART RATE: 77 BPM | TEMPERATURE: 97 F | RESPIRATION RATE: 16 BRPM | OXYGEN SATURATION: 94 % | BODY MASS INDEX: 19.49 KG/M2 | WEIGHT: 110 LBS | DIASTOLIC BLOOD PRESSURE: 55 MMHG | SYSTOLIC BLOOD PRESSURE: 134 MMHG

## 2021-06-04 VITALS
WEIGHT: 132 LBS | HEART RATE: 92 BPM | SYSTOLIC BLOOD PRESSURE: 94 MMHG | OXYGEN SATURATION: 97 % | BODY MASS INDEX: 23.38 KG/M2 | RESPIRATION RATE: 24 BRPM | DIASTOLIC BLOOD PRESSURE: 50 MMHG

## 2021-06-04 VITALS
OXYGEN SATURATION: 96 % | WEIGHT: 135.2 LBS | SYSTOLIC BLOOD PRESSURE: 130 MMHG | RESPIRATION RATE: 16 BRPM | BODY MASS INDEX: 23.95 KG/M2 | HEART RATE: 96 BPM | DIASTOLIC BLOOD PRESSURE: 62 MMHG

## 2021-06-04 NOTE — PROGRESS NOTES
[facility]      Code Status:  FULL CODE  Visit Type: FVP Care Coordination - Home Visit-Annual Assessment     Facility:  [facility] [[id number]]            History of Present Illness: Mo Swift is a 85 y.o. male who is currently admitted to LTC on the memory care unit.  He was initially admitted to the TCU as a transfer from the hospital.  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  He was noted to have an aspiration event episode while at the  center.  He underwent a Heimlich maneuver and he was then sent to the emergency room.  His wife who also helps him as his caregiver was in the TCU herself during the same time.  His children were unable to meet his needs and they recommended placement in the TCU for him also for PT OT strengthening and rehab.  He did have a bedside swallow eval with no aspiration event noted and an x-ray chest was negative. He was subsequently discharged on his current medication regimen.      LTC:  He currently resides in LTC.  He mostly wc bound now and declining physically/mentally on hospice through HE. Previously glipizide was discontinued per worsening renal fx, was restarted, though discontinued again per renal fx. He is maintained on metoprolol and lantus per family request.      Past Medical History:   Diagnosis Date     Alzheimer's dementia (H)      ASCVD (arteriosclerotic cardiovascular disease)      CAD (coronary artery disease)      Chronic diarrhea      CKD (chronic kidney disease) stage 3, GFR 30-59 ml/min      CVA (cerebral vascular accident)     2010, POST REPAIR OF LEFT HIP FRACTURE, RESIDUAL LEFT SIDE WEAKNESS       DM type 2 (diabetes mellitus, type 2)      Falls 06/02/2016    with L side pain     Former smoker      Full code status  2017     Glaucoma      HLD (hyperlipidemia)      HTN (hypertension)      Legally blind      Macular degeneration      Microalbuminuria      PN (peripheral neuropathy)      Pubic bone fracture (H) 2016    L     Stenosis of right carotid artery     Right 75%       Past Surgical History:   Procedure Laterality Date     APPENDECTOMY       CAROTID ENDARTERECTOMY Right      CHOLECYSTECTOMY      Early s.  Biopsy of liver.     shock tx      50 years ago fo paranoid schizophrenia     TOTAL HIP ARTHROPLASTY Left      Family History   Problem Relation Age of Onset     Dementia Mother         In stolen car.  Left out (literally) to freeze to death at 88 years.     Kidney disease Father          of kidney disease.     Other Brother         Only recently learned that he existed.  Health is unknown.     Social History     Socioeconomic History     Marital status:      Spouse name: [place]     Number of children: Not on file     Years of education: Not on file     Highest education level: Not on file   Occupational History     Employer: RETIRED   Social Needs     Financial resource strain: Not on file     Food insecurity:     Worry: Not on file     Inability: Not on file     Transportation needs:     Medical: Not on file     Non-medical: Not on file   Tobacco Use     Smoking status: Former Smoker     Last attempt to quit: 1987     Years since quittin.6     Smokeless tobacco: Former User     Tobacco comment: Oral tobacco only briefly.  Smoked 34 years.   Substance and Sexual Activity     Alcohol use: No     Comment: He has a history of issues with alcohol.     Drug use: No     Sexual activity: Not on file   Lifestyle     Physical activity:     Days per week: Not on file     Minutes per session: Not on file     Stress: Not on file   Relationships     Social connections:     Talks on phone: Not on file     Gets together: Not on file     Attends Worship service: Not on file     Active member of  club or organization: Not on file     Attends meetings of clubs or organizations: Not on file     Relationship status: Not on file     Intimate partner violence:     Fear of current or ex partner: Not on file     Emotionally abused: Not on file     Physically abused: Not on file     Forced sexual activity: Not on file   Other Topics Concern     Not on file   Social History Narrative    Patient of Dr. Vela since 2016.         Used to work in the [employer].        Uses a walker.  Goes to day program at [facility] for dementia.     Current Outpatient Medications   Medication Sig Dispense Refill     acetaminophen (TYLENOL) 325 MG tablet Take 2 tablets (650 mg total) by mouth every 4 (four) hours as needed.  0     cholecalciferol, vitamin D3, 1,000 unit tablet Take 2,000 Units by mouth daily.       insulin glargine (LANTUS) 100 unit/mL injection Inject 16 Units under the skin every morning. T.O. Dr. Lima/Sherri Fields RN       metoprolol succinate (TOPROL-XL) 25 MG Take 6.25 mg by mouth daily. Hold for HR <60 or SBP <100       omeprazole (PRILOSEC) 20 MG capsule Take 1 capsule (20 mg total) by mouth daily before breakfast.  0     No current facility-administered medications for this visit.      No Known Allergies    Review of Systems:    Constitutional: Negative.  Negative for fever, chills, has activity change, appetite change and fatigue. Denies pain. BSs variable. On hospice.  HENT: Negative for congestion and facial swelling.    Eyes: Negative for photophobia, redness and visual disturbance.   Respiratory: Negative for cough and chest tightness.    Cardiovascular: Negative for chest pain, palpitations and leg swelling.   Gastrointestinal: Negative for nausea, diarrhea, constipation, blood in stool and abdominal distention.   Genitourinary: Negative.    Musculoskeletal: Negative.    Skin: Chronic coccyx PU   Neurological: Negative for dizziness, tremors, syncope, weakness,  light-headedness and headaches.   Hematological: Does not bruise/bleed easily.   Psychiatric/Behavioral: Negative.  confused at baseline. Stable.    Vitals:    01/03/20 1418   BP: 134/55   Pulse: 77   Resp: 16   Temp: 97  F (36.1  C)   SpO2: 94%       Physical Exam:    GENERAL: no acute distress. Cooperative.  Denies pain. Continues on hospice with HE.   HEENT: pupils are equal, round and reactive. Oral mucosa is moist and intact. Patient has some hearing loss and is legally blind.  RESP:Chest symmetric. Regular respiratory rate. No stridor.  Dim, RA.  CVS: RRR, S1S2, no murmur, rub, or gallop.  ABD: Nondistended, soft. No constipation or diarrhea.  EXTREMITIES: No lower extremity edema.  Left-sided weakness.  More bed bound and uses wc occasionally.   NEURO: no focal deficits. Alert and oriented x1. Confused with a very poor recall. Declining.   PSYCH: within normal limits. No depression or anxiety.  SKIN: chronic coccyx PU      Labs:    No results found for this or any previous visit (from the past 240 hour(s)).  Lab Results   Component Value Date    HGBA1C 7.6 (H) 10/18/2019     Results for orders placed or performed during the hospital encounter of 11/26/19   Basic Metabolic Panel   Result Value Ref Range    Sodium 140 136 - 145 mmol/L    Potassium 4.5 3.5 - 5.0 mmol/L    Chloride 109 (H) 98 - 107 mmol/L    CO2 21 (L) 22 - 31 mmol/L    Anion Gap, Calculation 10 5 - 18 mmol/L    Glucose 102 70 - 125 mg/dL    Calcium 8.4 (L) 8.5 - 10.5 mg/dL    BUN 34 (H) 8 - 28 mg/dL    Creatinine 1.59 (H) 0.70 - 1.30 mg/dL    GFR MDRD Af Amer 50 (L) >60 mL/min/1.73m2    GFR MDRD Non Af Amer 42 (L) >60 mL/min/1.73m2         Assessment/Plan:    HTN: now hypotensive, continue metoprolol 6.25mg daily, norvasc has been discontinued, attempted to dc though family wants to continue, SBP <130    MAGDALENE: Last Cr 1.80 with GFR 36 on 4/30/19, declined in fx, no longer monitoring per hospice    DM: last A1c 7.7 on 5/2019. Continue lantus to  16U in am, monitor    Vit D def: continue D3 2000U daily, last vit D 34.1 on 12/2017    Dementia: No behaviors, stable.    Dysphagia: Continues on mech soft and regular consistency, no change.    Hospice care: ongoing with HE, initiated 11/2019      Case Management:  I have reviewed the facility/SNF care plan/MDS which was done 12/12/19, including the falls risk, nutrition and pain screening. I also reviewed the current immunizations, and preventive care.. Future cancer screening is not clinically indicated secondary to age/goals of care.   Patient's desire to return to the community is not assessible due to cognitive impairment.    Advance Directive Discussion:    I reviewed the current advanced directives as reflected in EPIC and the facility chart. I did not due to cognitive impairment review the advance directives with the resident.     Team Discussion:  I communicated with the appropriate disciplines involved with the Plan of Care:   Nursing      Patient Goal:  Patient's goal is unobtainable secondary to cognitive impairment.    Information reviewed:  Medications, vital signs, orders, and nursing notes.    Electronically signed by: Aldo Soalno NP

## 2021-06-08 NOTE — PROGRESS NOTES
ASSESSMENT: Onychauxis, diabetes with neurologic manifestations, chronic kidney disease, foot pain.    PLAN: Toenails were debrided manually and mechanically x10. Return to clinic in nine weeks.         SUBJECTIVE: Toenails are long, thick and painful. Last nail debridement in the clinic was October 24, 2016. Shoes rubbed wounds on the left 2nd and 3rd toes. The shoes were replaced, and toes are healing now.    OBJECTIVE:  Vascular: DP pulses are diminished. PT pulses are absent. Pedal hair is absent. Feet are cool to the touch.  Neuro: Sensation in the feet is diminished per monofilament.  Derm:  Toenails are elongated, thickened and dystrophic with discoloration and subungual debris. Wounds on the dorsum of left 2nd and 3rd toes with intact scabs. No drainage or open wound today.  Musculoskeletal: Hammertoes.

## 2021-06-09 NOTE — PROGRESS NOTES
ASSESSMENT: Onychauxis, diabetes with neurologic manifestations, chronic kidney disease, foot pain.    PLAN: Toenails were debrided manually and mechanically x10. Return to clinic in nine weeks.         SUBJECTIVE: Toenails are long, thick and painful. Last nail debridement in the clinic was January 9, 2017.  Scabs on the left second and third toe persist.  He continues to have shoe irritation here.    OBJECTIVE:  Vascular: DP pulses are diminished. PT pulses are absent. Pedal hair is absent. Feet are cool to the touch.  Neuro: Sensation in the feet is diminished per monofilament.  Derm:  Toenails are elongated, thickened and dystrophic with discoloration and subungual debris. Wounds on the dorsum of left 2nd and 3rd toes with intact scabs. No drainage.  Musculoskeletal: Hammertoes.

## 2021-06-10 NOTE — PROGRESS NOTES
ASSESSMENT: Onychauxis, diabetes with neurologic manifestations, chronic kidney disease, foot pain.    PLAN: Toenails were debrided manually and mechanically x10. Return to clinic in nine weeks.         SUBJECTIVE: Toenails are long, thick and painful. Last nail debridement in the clinic was March 13, 2017.  Some increased redness in the second and third toes in the left foot.  It is thought that he kicked something at home, but he does not remember much.  No open wounds.    OBJECTIVE:  Vascular: DP pulses are diminished. PT pulses are absent. Pedal hair is absent. Feet are cool to the touch.  Neuro: Sensation in the feet is diminished per monofilament.  Derm:  Toenails are elongated, thickened and dystrophic with discoloration and subungual debris.  Some erythema in the second and third toes without open wound.  Musculoskeletal: Clawtoes bilaterally.

## 2021-06-12 NOTE — PROGRESS NOTES
[facility]      Code Status:  FULL CODE  Visit Type: H & P     Facility:  [facility] [[id number]]           History of Present Illness: Mo Swift is a 82 y.o. male who is currently admitted to LT.  He was initially admitted to the TCU as a transfer from the hospital.  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  He was noted to have an aspiration event/choking episode while at the  center.  He will had a Heimlich maneuver performed and he was then sent to the emergency room.  His wife who also helps him as his caregiver is currently in the TCU herself.  His children were unable to meet his needs and they recommended placement in the TCU for him also for PT OT strengthening and rehab.    He did have a bedside swallow eval with no aspiration event noted and an x-ray chest was negative to and he was discharged on his current medication regimen,  While in the TCU he did quite well mood and behaviors remain stable though he tested extremely poorly cognitively indicating moderate to severe dementia.  His initial testing slums was 1/30 and a repeat CPT was 3.3/ 5.6  To ambulate using a 4 wheeled walker but remains a high fall risk and 24 hour supervision has been recommended for him upon discharge.  Family has elected to move him to long-term care.  Past Medical History:   Diagnosis Date     Alzheimer's dementia      ASCVD (arteriosclerotic cardiovascular disease)      CAD (coronary artery disease)      Chronic diarrhea      CKD (chronic kidney disease) stage 3, GFR 30-59 ml/min      CVA (cerebral vascular accident)     2010, POST REPAIR OF LEFT HIP FRACTURE, RESIDUAL LEFT SIDE WEAKNESS       DM type 2 (diabetes mellitus, type 2)      Falls  2016    with L side pain     Former smoker      Full code status 2017     Glaucoma      HLD (hyperlipidemia)      HTN (hypertension)      Legally blind      Macular degeneration      Microalbuminuria      PN (peripheral neuropathy)      Pubic bone fracture 2016    L     Stenosis of right carotid artery     Right 75%       Past Surgical History:   Procedure Laterality Date     APPENDECTOMY       CAROTID ENDARTERECTOMY Right      CHOLECYSTECTOMY      Early 30's.  Biopsy of liver.     shock tx      50 years ago fo paranoid schizophrenia     TOTAL HIP ARTHROPLASTY Left      Family History   Problem Relation Age of Onset     Dementia Mother      In stolen car.  Left out (literally) to freeze to death at 88 years.     Kidney disease Father       of kidney disease.     Other Brother      Only recently learned that he existed.  Health is unknown.     Social History     Social History     Marital status:      Spouse name: [place]     Number of children: N/A     Years of education: N/A     Occupational History      Retired     Social History Main Topics     Smoking status: Former Smoker     Quit date: 1987     Smokeless tobacco: Former User      Comment: Oral tobacco only briefly.  Smoked 34 years.     Alcohol use No      Comment: He has a history of issues with alcohol.     Drug use: No     Sexual activity: Not on file     Other Topics Concern     Not on file     Social History Narrative    Patient of Dr. Vela since .         Used to work in the [employer].        Uses a walker.  Goes to day program at [facility] for dementia.     Current Outpatient Prescriptions   Medication Sig Dispense Refill     amLODIPine (NORVASC) 10 MG tablet Take 10 mg by mouth daily.       glipiZIDE (GLUCOTROL) 5 MG tablet Take 2.5 mg by mouth daily with breakfast.       metoprolol tartrate (LOPRESSOR) 25 MG tablet Take 25 mg by mouth 2 (two) times a day.       multivitamin  (MULTIVITAMIN) per tablet Take 1 tablet by mouth daily.       pravastatin (PRAVACHOL) 20 MG tablet Take 20 mg by mouth at bedtime.       No current facility-administered medications for this visit.      No Known Allergies      Review of Systems:    Constitutional: Negative.  Negative for fever, chills, has activity change, appetite change and fatigue.   HENT: Negative for congestion and facial swelling.    Eyes: Negative for photophobia, redness and visual disturbance.   Respiratory: Negative for cough and chest tightness.    Cardiovascular: Negative for chest pain, palpitations and leg swelling.   Gastrointestinal: Negative for nausea, diarrhea, constipation, blood in stool and abdominal distention.   Genitourinary: Negative.    Musculoskeletal: Negative.    Skin: Negative.    Neurological: Negative for dizziness, tremors, syncope, weakness, light-headedness and headaches.   Hematological: Does not bruise/bleed easily.   Psychiatric/Behavioral: Negative.  confused    Vitals:    08/14/17 1645   BP: 128/65   Pulse: 72   Resp: 18   Temp: 98  F (36.7  C)       Physical Exam:    GENERAL: no acute distress. Cooperative in conversation.   HEENT: pupils are equal, round and reactive. Oral mucosa is moist and intact.  Patient has some hearing loss and is legally blind  RESP:Chest symmetric. Regular respiratory rate. No stridor.  CVS: S1S2  ABD: Nondistended, soft.  EXTREMITIES: No lower extremity edema.  Left-sided weakness with some spasticity of his hand noted uses a walker for ambulation  NEURO: non focal. Alert and oriented x3. Confused with a very poor recall  PSYCH: within normal limits. No depression or anxiety.  SKIN: warm dry intact       Labs:    No results found for this or any previous visit (from the past 240 hour(s)).  Lab Results   Component Value Date    HGBA1C 7.2 (H) 07/22/2017     Xr Chest Pa And Lateral  Result Date: 7/21/2017  XR CHEST PA AND LATERAL 7/21/2017 2:49 PM INDICATION: Aspiration COMPARISON:  7/8/2015 FINDINGS: Lung volumes are low with crowding of the pulmonary markings. No definite infiltrate or pleural effusion.  Assessment/Plan:    Alzheimer's dementia with initial testing slums of 1/30; CPT of 3.3/5.6  Status post V CVA with left lower extremity weakness chronic  Diabetes type 2-discharge on oral Glucotrol and last A1c of 7.2.  He is not a candidate for tight control  Chronic kidney disease  Legal blindness  Debilitation in a patient who remains a very high fall risk in light of his visual impairment and cognitive impairments  Hyperlipidemia on Pravachol  Hypertension takes Norvasc as well as metoprolol  Patient was initially admitted to the TCU and did rehab he is able to ambulate using a 2 wheeled walker but remains at high fall risk in light of his significant cognitive impairment  And physical debilitation with fall risk it was recommended to family that he should be given 24 hour supervision  Wife has indicated she no longer can meet his needs and has elected to move him to long-term care where he seems to be adjusting well  Total time spent was 45 minutes, more than half of it was in face-to-face counseling regarding disease state, treatment, side effects, documentation, review of clinical data and coordination of care  Electronically signed by: ADELINE Baptiste  This progress note was completed using Dragon software and there may be grammatical errors.

## 2021-06-12 NOTE — PROGRESS NOTES
[facility]      Code Status:  FULL CODE  Visit Type: H & P     Facility:  [facility] [[id number]]           History of Present Illness: Mo Swift is a 82 y.o. 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  He will had a Heimlich maneuver performed and he was then sent to the emergency room.  His wife who also helps him as his caregiver is currently in the TCU herself.  His children were unable to meet his needs and they recommended placement in the TCU for him also for PT OT strengthening and rehab.    He did have a bedside swallow eval with no aspiration event noted and an x-ray chest was negative to and he was discharged on his current medication regimen  Past Medical History:   Diagnosis Date     Alzheimer's dementia      ASCVD (arteriosclerotic cardiovascular disease)      CAD (coronary artery disease)      Chronic diarrhea      CKD (chronic kidney disease) stage 3, GFR 30-59 ml/min      CVA (cerebral vascular accident)     2010, POST REPAIR OF LEFT HIP FRACTURE, RESIDUAL LEFT SIDE WEAKNESS       DM type 2 (diabetes mellitus, type 2)      Falls 06/02/2016    with L side pain     Former smoker      Full code status 1/22/2017     Glaucoma      HLD (hyperlipidemia)      HTN (hypertension)      Legally blind      Macular degeneration      Microalbuminuria      PN (peripheral neuropathy)      Pubic bone fracture 06/02/2016    L     Stenosis of right carotid artery     Right 75%       Past Surgical History:   Procedure Laterality Date      APPENDECTOMY       CAROTID ENDARTERECTOMY Right      CHOLECYSTECTOMY      Early s.  Biopsy of liver.     shock tx      50 years ago fo paranoid schizophrenia     TOTAL HIP ARTHROPLASTY Left      Family History   Problem Relation Age of Onset     Dementia Mother      In stolen car.  Left out (literally) to freeze to death at 88 years.     Kidney disease Father       of kidney disease.     Other Brother      Only recently learned that he existed.  Health is unknown.     Social History     Social History     Marital status:      Spouse name: [place]     Number of children: N/A     Years of education: N/A     Occupational History      Retired     Social History Main Topics     Smoking status: Former Smoker     Quit date: 1987     Smokeless tobacco: Former User      Comment: Oral tobacco only briefly.  Smoked 34 years.     Alcohol use No      Comment: He has a history of issues with alcohol.     Drug use: No     Sexual activity: Not on file     Other Topics Concern     Not on file     Social History Narrative    Patient of Dr. Vela since 2016.         Used to work in the [employer].        Uses a walker.  Goes to day program at [facility] for dementia.     Current Outpatient Prescriptions   Medication Sig Dispense Refill     amLODIPine (NORVASC) 10 MG tablet Take 10 mg by mouth daily.       glipiZIDE (GLUCOTROL) 5 MG tablet Take 2.5 mg by mouth daily with breakfast.       metoprolol tartrate (LOPRESSOR) 25 MG tablet Take 25 mg by mouth 2 (two) times a day.       multivitamin (MULTIVITAMIN) per tablet Take 1 tablet by mouth daily.       pravastatin (PRAVACHOL) 20 MG tablet Take 20 mg by mouth at bedtime.       No current facility-administered medications for this visit.      No Known Allergies      Review of Systems:    Constitutional: Negative.  Negative for fever, chills, has activity change, appetite change and fatigue.   HENT: Negative for congestion and facial swelling.     Eyes: Negative for photophobia, redness and visual disturbance.   Respiratory: Negative for cough and chest tightness.    Cardiovascular: Negative for chest pain, palpitations and leg swelling.   Gastrointestinal: Negative for nausea, diarrhea, constipation, blood in stool and abdominal distention.   Genitourinary: Negative.    Musculoskeletal: Negative.    Skin: Negative.    Neurological: Negative for dizziness, tremors, syncope, weakness, light-headedness and headaches.   Hematological: Does not bruise/bleed easily.   Psychiatric/Behavioral: Negative.  confused    Vitals:    07/26/17 1508   BP: 150/74   Pulse: 63   Resp: 17   Temp: 97  F (36.1  C)       Physical Exam:    GENERAL: no acute distress. Cooperative in conversation.   HEENT: pupils are equal, round and reactive. Oral mucosa is moist and intact.  Patient has some hearing loss and is legally blind  RESP:Chest symmetric. Regular respiratory rate. No stridor.  CVS: S1S2  ABD: Nondistended, soft.  EXTREMITIES: No lower extremity edema.  Left-sided weakness with some spasticity of his hand noted uses a walker for ambulation  NEURO: non focal. Alert and oriented x3. Confused with a very poor recall  PSYCH: within normal limits. No depression or anxiety.  SKIN: warm dry intact       Labs:    Recent Results (from the past 240 hour(s))   HM1 (CBC with Diff)   Result Value Ref Range    WBC 7.0 4.0 - 11.0 thou/uL    RBC 4.17 (L) 4.40 - 6.20 mill/uL    Hemoglobin 12.9 (L) 14.0 - 18.0 g/dL    Hematocrit 37.5 (L) 40.0 - 54.0 %    MCV 90 80 - 100 fL    MCH 30.9 27.0 - 34.0 pg    MCHC 34.4 32.0 - 36.0 g/dL    RDW 13.8 11.0 - 14.5 %    Platelets 151 140 - 440 thou/uL    MPV 10.2 8.5 - 12.5 fL    Neutrophils % 87 (H) 50 - 70 %    Lymphocytes % 6 (L) 20 - 40 %    Monocytes % 6 2 - 10 %    Eosinophils % 1 0 - 6 %    Basophils % 0 0 - 2 %    Neutrophils Absolute 6.0 2.0 - 7.7 thou/uL    Lymphocytes Absolute 0.4 (L) 0.8 - 4.4 thou/uL    Monocytes Absolute 0.4 0.0 - 0.9  thou/uL    Eosinophils Absolute 0.1 0.0 - 0.4 thou/uL    Basophils Absolute 0.0 0.0 - 0.2 thou/uL   Basic Metabolic Panel   Result Value Ref Range    Sodium 140 136 - 145 mmol/L    Potassium 4.8 3.5 - 5.0 mmol/L    Chloride 105 98 - 107 mmol/L    CO2 26 22 - 31 mmol/L    Anion Gap, Calculation 9 5 - 18 mmol/L    Glucose 261 (H) 70 - 125 mg/dL    Calcium 9.1 8.5 - 10.5 mg/dL    BUN 36 (H) 8 - 28 mg/dL    Creatinine 1.62 (H) 0.70 - 1.30 mg/dL    GFR MDRD Af Amer 50 (L) >60 mL/min/1.73m2    GFR MDRD Non Af Amer 41 (L) >60 mL/min/1.73m2   POCT Glucose   Result Value Ref Range    Glucose,  mg/dL   POCT Glucose   Result Value Ref Range    Glucose,  mg/dL   Glycosylated Hemoglobin A1C   Result Value Ref Range    Hemoglobin A1c 7.2 (H) 4.2 - 6.1 %   Hemogram with PLT   Result Value Ref Range    WBC 6.9 4.0 - 11.0 thou/uL    RBC 3.85 (L) 4.40 - 6.20 mill/uL    Hemoglobin 11.7 (L) 14.0 - 18.0 g/dL    Hematocrit 35.1 (L) 40.0 - 54.0 %    MCV 91 80 - 100 fL    MCH 30.4 27.0 - 34.0 pg    MCHC 33.3 32.0 - 36.0 g/dL    RDW 13.9 11.0 - 14.5 %    Platelets 142 140 - 440 thou/uL    MPV 10.2 8.5 - 12.5 fL   POCT Glucose   Result Value Ref Range    Glucose,  mg/dL   POCT Glucose   Result Value Ref Range    Glucose,  mg/dL   POCT Glucose   Result Value Ref Range    Glucose,  mg/dL   POCT Glucose   Result Value Ref Range    Glucose,  mg/dL   Platelet Count - every other day x 3   Result Value Ref Range    Platelets 128 (L) 140 - 440 thou/uL   POCT Glucose   Result Value Ref Range    Glucose, POC 93 mg/dL   POCT Glucose   Result Value Ref Range    Glucose,  mg/dL   POCT Glucose   Result Value Ref Range    Glucose,  mg/dL   POCT Glucose   Result Value Ref Range    Glucose,  mg/dL   POCT Glucose   Result Value Ref Range    Glucose,  mg/dL   POCT Glucose   Result Value Ref Range    Glucose,  mg/dL     Lab Results   Component Value Date    HGBA1C 7.2 (H) 07/22/2017      Xr Chest Pa And Lateral  Result Date: 7/21/2017  XR CHEST PA AND LATERAL 7/21/2017 2:49 PM INDICATION: Aspiration COMPARISON: 7/8/2015 FINDINGS: Lung volumes are low with crowding of the pulmonary markings. No definite infiltrate or pleural effusion.  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  He is not a candidate for tight control  Chronic kidney disease  Legal blindness  Alzheimer's dementia  Hyperlipidemia on Pravachol  Hypertension takes Norvasc as well as metoprolol  Patient was admitted and extensive workup done including bedside swallow test has been negative he has been discharged to the TCU for rehab with advice to follow-up  His family was unable to meet his needs and they elected for TCU placement for some strengthening while his wife who happens to be his main caregiver is also in the nursing home  He and his wife both may need ultimate placement    Total time spent was 45 minutes, more than half of it was in face-to-face counseling regarding disease state, treatment, side effects, documentation, review of clinical data and coordination of care  Electronically signed by: ADELINE Baptiste  This progress note was completed using Dragon software and there may be grammatical errors.

## 2021-06-12 NOTE — PROGRESS NOTES
[facility]      Code Status:  FULL CODE  Visit Type: Review Of Multiple Medical Conditions     Facility:  [facility] [[id number]]           History of Present Illness: Mo Swift is a 82 y.o. 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  He will had a Heimlich maneuver performed and he was then sent to the emergency room.  His wife who also helps him as his caregiver is currently in the TCU herself.  His children were unable to meet his needs and they recommended placement in the TCU for him also for PT OT strengthening and rehab.    He did have a bedside swallow eval with no aspiration event noted and an x-ray chest was negative to and he was discharged on his current medication regimen  Continues to be incontinent very weak needing assistance with all his ADLs.  He also has history of wandering at nighttime with significant dementia as per his wife.  Patient denies any pain or discomfort  His weights are stable.  He remains confused but at baseline mood and behaviors with no new concerns voiced by patient or staff  Past Medical History:   Diagnosis Date     Alzheimer's dementia      ASCVD (arteriosclerotic cardiovascular disease)      CAD (coronary artery disease)      Chronic diarrhea      CKD (chronic kidney disease) stage 3, GFR 30-59 ml/min      CVA (cerebral vascular accident)     2010, POST REPAIR OF LEFT HIP FRACTURE, RESIDUAL LEFT SIDE WEAKNESS       DM type 2 (diabetes mellitus, type 2)      Falls 06/02/2016    with L side pain      Former smoker      Full code status 2017     Glaucoma      HLD (hyperlipidemia)      HTN (hypertension)      Legally blind      Macular degeneration      Microalbuminuria      PN (peripheral neuropathy)      Pubic bone fracture 2016    L     Stenosis of right carotid artery     Right 75%       Past Surgical History:   Procedure Laterality Date     APPENDECTOMY       CAROTID ENDARTERECTOMY Right      CHOLECYSTECTOMY      Early 30s.  Biopsy of liver.     shock tx      50 years ago fo paranoid schizophrenia     TOTAL HIP ARTHROPLASTY Left      Family History   Problem Relation Age of Onset     Dementia Mother      In stolen car.  Left out (literally) to freeze to death at 88 years.     Kidney disease Father       of kidney disease.     Other Brother      Only recently learned that he existed.  Health is unknown.     Social History     Social History     Marital status:      Spouse name: [place]     Number of children: N/A     Years of education: N/A     Occupational History      Retired     Social History Main Topics     Smoking status: Former Smoker     Quit date: 1987     Smokeless tobacco: Former User      Comment: Oral tobacco only briefly.  Smoked 34 years.     Alcohol use No      Comment: He has a history of issues with alcohol.     Drug use: No     Sexual activity: Not on file     Other Topics Concern     Not on file     Social History Narrative    Patient of Dr. Vela since 2016.         Used to work in the [employer].        Uses a walker.  Goes to day program at [facility] [employer] for dementia.     Current Outpatient Prescriptions   Medication Sig Dispense Refill     amLODIPine (NORVASC) 10 MG tablet Take 10 mg by mouth daily.       glipiZIDE (GLUCOTROL) 5 MG tablet Take 2.5 mg by mouth daily with breakfast.       metoprolol tartrate (LOPRESSOR) 25 MG tablet Take 25 mg by mouth 2 (two) times a day.       multivitamin (MULTIVITAMIN) per tablet Take 1 tablet by  mouth daily.       pravastatin (PRAVACHOL) 20 MG tablet Take 20 mg by mouth at bedtime.       No current facility-administered medications for this visit.      No Known Allergies      Review of Systems:    Constitutional: Negative.  Negative for fever, chills, has activity change, appetite change and fatigue.   HENT: Negative for congestion and facial swelling.    Eyes: Negative for photophobia, redness and visual disturbance.   Respiratory: Negative for cough and chest tightness.    Cardiovascular: Negative for chest pain, palpitations and leg swelling.   Gastrointestinal: Negative for nausea, diarrhea, constipation, blood in stool and abdominal distention.   Genitourinary: Negative.    Musculoskeletal: Negative.    Skin: Negative.    Neurological: Negative for dizziness, tremors, syncope, weakness, light-headedness and headaches.   Hematological: Does not bruise/bleed easily.   Psychiatric/Behavioral: Negative.  confused    Vitals:    08/07/17 1506   BP: 162/79   Pulse: 87   Temp: 98  F (36.7  C)       Physical Exam:    GENERAL: no acute distress. Cooperative in conversation.   HEENT: pupils are equal, round and reactive. Oral mucosa is moist and intact.  Patient has some hearing loss and is legally blind  RESP:Chest symmetric. Regular respiratory rate. No stridor.  CVS: S1S2  ABD: Nondistended, soft.  EXTREMITIES: No lower extremity edema.  Left-sided weakness with some spasticity of his hand noted uses a walker for ambulation  NEURO: non focal. Alert and oriented xself. Confused with a very poor recall  PSYCH: within normal limits. No depression or anxiety.  SKIN: warm dry intact       Labs:      Lab Results   Component Value Date    HGBA1C 7.2 (H) 07/22/2017     Xr Chest Pa And Lateral  Result Date: 7/21/2017  XR CHEST PA AND LATERAL 7/21/2017 2:49 PM INDICATION: Aspiration COMPARISON: 7/8/2015 FINDINGS: Lung volumes are low with crowding of the pulmonary markings. No definite infiltrate or pleural  effusion.  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  He is not a candidate for tight control  Chronic kidney disease  Legal blindness  Alzheimer's dementia SLUMS 1/30; CPT was 3.5  Hyperlipidemia on Pravachol  Hypertension takes Norvasc as well as metoprolol  Patient was admitted and extensive workup done including bedside swallow test has been negative he has been discharged to the TCU for rehab with advice to follow-up  Speech evaluation however feels that he needs downgraded to mechanical soft diet due to this current coordination with possible penetration.  He remains a modified independence to max assist with ADLs  Care plan was reviewed with his wife present at bedside.  She is planning to move him to long-term care versus alternative placement.  He has significant dementia and tends to wander at night.  Family is looking for respite care also for him  He remains quite weak meeting and assistance of 2 people with transfers and 1% for his ADLs he remains also incontinent of bowel and bladder and alert only to self  Family is looking at care based on his needs.  CPT is 3.5 and he would need significant cares upon discharge  Electronically signed by: ADELINE Baptiste  This progress note was completed using Dragon software and there may be grammatical errors.

## 2021-06-12 NOTE — PROGRESS NOTES
[facility]      Code Status:  FULL CODE  Visit Type: Review Of Multiple Medical Conditions     Facility:  [facility] [[id number]]           History of Present Illness: Mo Swift is a 82 y.o. 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  He will had a Heimlich maneuver performed and he was then sent to the emergency room.  His wife who also helps him as his caregiver is currently in the TCU herself.  His children were unable to meet his needs and they recommended placement in the TCU for him also for PT OT strengthening and rehab.    He did have a bedside swallow eval with no aspiration event noted and an x-ray chest was negative to and he was discharged on his current medication regimen  Continues to be incontinent very weak needing assistance with all his ADLs.  He also has history of wandering at nighttime with significant dementia as per his wife.  Patient denies any pain or discomfort  His weights are stable.  Past Medical History:   Diagnosis Date     Alzheimer's dementia      ASCVD (arteriosclerotic cardiovascular disease)      CAD (coronary artery disease)      Chronic diarrhea      CKD (chronic kidney disease) stage 3, GFR 30-59 ml/min      CVA (cerebral vascular accident)     2010, POST REPAIR OF LEFT HIP FRACTURE, RESIDUAL LEFT SIDE WEAKNESS       DM type 2 (diabetes mellitus, type 2)      Falls 06/02/2016    with L side pain     Former smoker      Full code status 1/22/2017     Glaucoma      HLD (hyperlipidemia)      HTN (hypertension)       Legally blind      Macular degeneration      Microalbuminuria      PN (peripheral neuropathy)      Pubic bone fracture 2016    L     Stenosis of right carotid artery     Right 75%       Past Surgical History:   Procedure Laterality Date     APPENDECTOMY       CAROTID ENDARTERECTOMY Right      CHOLECYSTECTOMY      Early s.  Biopsy of liver.     shock tx      50 years ago fo paranoid schizophrenia     TOTAL HIP ARTHROPLASTY Left      Family History   Problem Relation Age of Onset     Dementia Mother      In stolen car.  Left out (literally) to freeze to death at 88 years.     Kidney disease Father       of kidney disease.     Other Brother      Only recently learned that he existed.  Health is unknown.     Social History     Social History     Marital status:      Spouse name: [place]     Number of children: N/A     Years of education: N/A     Occupational History      Retired     Social History Main Topics     Smoking status: Former Smoker     Quit date: 1987     Smokeless tobacco: Former User      Comment: Oral tobacco only briefly.  Smoked 34 years.     Alcohol use No      Comment: He has a history of issues with alcohol.     Drug use: No     Sexual activity: Not on file     Other Topics Concern     Not on file     Social History Narrative    Patient of Dr. Vela since .         Used to work in the [employer].        Uses a walker.  Goes to day program at [facility] [employer] for dementia.     Current Outpatient Prescriptions   Medication Sig Dispense Refill     amLODIPine (NORVASC) 10 MG tablet Take 10 mg by mouth daily.       glipiZIDE (GLUCOTROL) 5 MG tablet Take 2.5 mg by mouth daily with breakfast.       metoprolol tartrate (LOPRESSOR) 25 MG tablet Take 25 mg by mouth 2 (two) times a day.       multivitamin (MULTIVITAMIN) per tablet Take 1 tablet by mouth daily.       pravastatin (PRAVACHOL) 20 MG tablet Take 20 mg by mouth at bedtime.       No current  facility-administered medications for this visit.      No Known Allergies      Review of Systems:    Constitutional: Negative.  Negative for fever, chills, has activity change, appetite change and fatigue.   HENT: Negative for congestion and facial swelling.    Eyes: Negative for photophobia, redness and visual disturbance.   Respiratory: Negative for cough and chest tightness.    Cardiovascular: Negative for chest pain, palpitations and leg swelling.   Gastrointestinal: Negative for nausea, diarrhea, constipation, blood in stool and abdominal distention.   Genitourinary: Negative.    Musculoskeletal: Negative.    Skin: Negative.    Neurological: Negative for dizziness, tremors, syncope, weakness, light-headedness and headaches.   Hematological: Does not bruise/bleed easily.   Psychiatric/Behavioral: Negative.  confused    Vitals:    07/31/17 1126   BP: 160/68   Pulse: 63   Temp: 98  F (36.7  C)       Physical Exam:    GENERAL: no acute distress. Cooperative in conversation.   HEENT: pupils are equal, round and reactive. Oral mucosa is moist and intact.  Patient has some hearing loss and is legally blind  RESP:Chest symmetric. Regular respiratory rate. No stridor.  CVS: S1S2  ABD: Nondistended, soft.  EXTREMITIES: No lower extremity edema.  Left-sided weakness with some spasticity of his hand noted uses a walker for ambulation  NEURO: non focal. Alert and oriented xself. Confused with a very poor recall  PSYCH: within normal limits. No depression or anxiety.  SKIN: warm dry intact       Labs:      Lab Results   Component Value Date    HGBA1C 7.2 (H) 07/22/2017     Xr Chest Pa And Lateral  Result Date: 7/21/2017  XR CHEST PA AND LATERAL 7/21/2017 2:49 PM INDICATION: Aspiration COMPARISON: 7/8/2015 FINDINGS: Lung volumes are low with crowding of the pulmonary markings. No definite infiltrate or pleural effusion.  Assessment/Plan:    Aspiration/choking event at a  center  Status post V CVA with left lower  extremity weakness chronic  Diabetes type 2-discharge on oral Glucotrol and last A1c of 7.2.  He is not a candidate for tight control  Chronic kidney disease  Legal blindness  Alzheimer's dementia SLUMS 1/30  Hyperlipidemia on Pravachol  Hypertension takes Norvasc as well as metoprolol  Patient was admitted and extensive workup done including bedside swallow test has been negative he has been discharged to the TCU for rehab with advice to follow-up  Speech evaluation however feels that he needs downgraded to mechanical soft diet due to this current coordination with possible penetration.  He remains a modified independence to max assist with ADLs  Care plan was reviewed with his wife present at bedside.  She is planning to move him to long-term care versus alternative placement.  He has significant dementia and tends to wander at night.  Family is looking for respite care also for him  He remains quite weak meeting and assistance of 2 people with transfers and 1% for his ADLs he remains also incontinent of bowel and bladder and alert only to self  Family is looking at care based on his needs.  Total time spent was 35 minutes, more than half of it was in face-to-face counseling regarding disease state, treatment, side effects, documentation, review of clinical data and coordination of care  Electronically signed by: ADELINE Baptiste  This progress note was completed using Dragon software and there may be grammatical errors.

## 2021-06-13 NOTE — PROGRESS NOTES
[facility]      Code Status:  FULL CODE  Visit Type: Review Of Multiple Medical Conditions     Facility:  [facility] [[id number]]           History of Present Illness: Mo Swift is a 82 y.o. male who is currently a resident of [facility].  He was initially admitted to the TCU as a transfer from the hospital.  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  His initial testing slums was 1/30 and a repeat CPT was 3.3/ 5.6  To ambulate using a 4 wheeled walker but remains a high fall risk   He generally remains WC bound and does not do much-sleeps all day  He has been gaining weight and due to excessive caloric consumption his blood sugars are now much higher running between 150-300 400+ he is was started on insulin sliding scale.  Past Medical History:   Diagnosis Date     Alzheimer's dementia      ASCVD (arteriosclerotic cardiovascular disease)      CAD (coronary artery disease)      Chronic diarrhea      CKD (chronic kidney disease) stage 3, GFR 30-59 ml/min      CVA (cerebral vascular accident)     2010, POST REPAIR OF LEFT HIP FRACTURE, RESIDUAL LEFT SIDE WEAKNESS       DM type 2 (diabetes mellitus, type 2)      Falls 06/02/2016    with L side pain     Former smoker      Full code status 1/22/2017     Glaucoma      HLD (hyperlipidemia)      HTN (hypertension)      Legally blind      Macular degeneration      Microalbuminuria      PN (peripheral neuropathy)      Pubic bone fracture 06/02/2016    L     Stenosis of right carotid artery     Right 75%       Past Surgical History:   Procedure Laterality Date     APPENDECTOMY       CAROTID ENDARTERECTOMY  Right      CHOLECYSTECTOMY      Early .  Biopsy of liver.     shock tx      50 years ago fo paranoid schizophrenia     TOTAL HIP ARTHROPLASTY Left      Family History   Problem Relation Age of Onset     Dementia Mother      In stolen car.  Left out (literally) to freeze to death at 88 years.     Kidney disease Father       of kidney disease.     Other Brother      Only recently learned that he existed.  Health is unknown.     Social History     Social History     Marital status:      Spouse name: [place]     Number of children: N/A     Years of education: N/A     Occupational History      Retired     Social History Main Topics     Smoking status: Former Smoker     Quit date: 1987     Smokeless tobacco: Former User      Comment: Oral tobacco only briefly.  Smoked 34 years.     Alcohol use No      Comment: He has a history of issues with alcohol.     Drug use: No     Sexual activity: Not on file     Other Topics Concern     Not on file     Social History Narrative    Patient of Dr. Vela since 2016.         Used to work in the [employer].        Uses a walker.  Goes to day program at [facility] for dementia.     Current Outpatient Prescriptions   Medication Sig Dispense Refill     amLODIPine (NORVASC) 10 MG tablet Take 10 mg by mouth daily.       glipiZIDE (GLUCOTROL) 5 MG tablet Take 2.5 mg by mouth daily with breakfast.       metoprolol tartrate (LOPRESSOR) 25 MG tablet Take 25 mg by mouth 2 (two) times a day.       multivitamin (MULTIVITAMIN) per tablet Take 1 tablet by mouth daily.       pravastatin (PRAVACHOL) 20 MG tablet Take 20 mg by mouth at bedtime.       No current facility-administered medications for this visit.      No Known Allergies      Review of Systems:    Constitutional: Negative.  Negative for fever, chills, has activity change, appetite change and fatigue.   HENT: Negative for congestion and facial swelling.    Eyes: Negative for photophobia,  redness and visual disturbance.   Respiratory: Negative for cough and chest tightness.    Cardiovascular: Negative for chest pain, palpitations and leg swelling.   Gastrointestinal: Negative for nausea, diarrhea, constipation, blood in stool and abdominal distention.   Genitourinary: Negative.    Musculoskeletal: Negative.    Skin: Negative.    Neurological: Negative for dizziness, tremors, syncope, weakness, light-headedness and headaches.   Hematological: Does not bruise/bleed easily.   Psychiatric/Behavioral: Negative.  confused    Vitals:    10/25/17 1553   BP: 123/67   Pulse: 73   Temp: 98  F (36.7  C)       Physical Exam:    GENERAL: no acute distress. Cooperative in conversation.   HEENT: pupils are equal, round and reactive. Oral mucosa is moist and intact.  Patient has some hearing loss and is legally blind  RESP:Chest symmetric. Regular respiratory rate. No stridor.  CVS: S1S2  ABD: Nondistended, soft.  EXTREMITIES: No lower extremity edema.  Left-sided weakness with some spasticity of his hand noted uses a walker for ambulation  NEURO: non focal. Alert and oriented x3. Confused with a very poor recall  PSYCH: within normal limits. No depression or anxiety.  SKIN: warm dry intact       Labs:    No results found for this or any previous visit (from the past 240 hour(s)).  Lab Results   Component Value Date    HGBA1C 7.2 (H) 07/22/2017     Xr Chest Pa And Lateral  Result Date: 7/21/2017  XR CHEST PA AND LATERAL 7/21/2017 2:49 PM INDICATION: Aspiration COMPARISON: 7/8/2015 FINDINGS: Lung volumes are low with crowding of the pulmonary markings. No definite infiltrate or pleural effusion.  Assessment/Plan:    Alzheimer's dementia with initial testing slums of 1/30; CPT of 3.3/5.6  Status post V CVA with left lower extremity weakness chronic  Diabetes type 2-discharge on oral Glucotrol and last A1c of 7.2.  He is not a candidate for tight control  Chronic kidney disease  Legal blindness  Debilitation in a  patient who remains a very high fall risk in light of his visual impairment and cognitive impairments  Hyperlipidemia on Pravachol  Hypertension takes Norvasc as well as metoprolol  Patient has adjusted well to long-term care with no significant behavioral issues noted  No recent falls most of the time he tends to sleep and remains wheelchair-bound  He has gained close to 7-8 pounds and I will asked nursing to see if he is getting any supplements which need to be discontinued we are encouraging staff to give him only a diabetic diet.  In addition DC his glipizide  Add Glucotrol XL 10 mg in the morning with breakfast continue with insulin sliding scale.  He has also had occasional blood pressures which have been elevated most of the time the stable to continue to monitor his trends closely and adjust accordingly  To recheck labs including another A1c soon  Care plan was also reviewed with nurse  Total time spent was 35 minutes, more than half of it was in face-to-face counseling regarding disease state, treatment, side effects, documentation, review of clinical data and coordination of care  Electronically signed by: ADELINE Baptiste  This progress note was completed using Dragon software and there may be grammatical errors.

## 2021-06-13 NOTE — PROGRESS NOTES
[facility]      Code Status:  FULL CODE  Visit Type: Review Of Multiple Medical Conditions     Facility:  [facility] [[id number]]        -2    History of Present Illness: Mo Swift is a 82 y.o. male who is currently admitted to LT on the memory care unit.  He was initially admitted to the TCU as a transfer from the hospital.  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  He was noted to have an aspiration event episode while at the  center.  He underwent a Heimlich maneuver and he was then sent to the emergency room.  His wife who also helps him as his caregiver was in the TCU herself during the same time.  His children were unable to meet his needs and they recommended placement in the TCU for him also for PT OT strengthening and rehab.  He did have a bedside swallow eval with no aspiration event noted and an x-ray chest was negative. He was subsequently discharged on his current medication regimen.  While in the TCU he did quite well with mood and behaviors remain stable though he tested extremely poorly cognitively indicating moderate to severe dementia.  His initial testing slums was 1/30 and a repeat CPT was 3.3/ 5.6. Currently he ambulates using a 4 wheeled walker but remains a high fall risk and 24 hour supervision.  Family has elected to move him into long-term care.    Past Medical History:   Diagnosis Date     Alzheimer's dementia      ASCVD (arteriosclerotic cardiovascular disease)      CAD (coronary artery disease)      Chronic diarrhea      CKD (chronic kidney disease) stage 3, GFR 30-59 ml/min      CVA (cerebral vascular accident)     2010, POST REPAIR OF LEFT HIP FRACTURE, RESIDUAL LEFT SIDE WEAKNESS       DM type 2 (diabetes mellitus, type 2)      Falls  2016    with L side pain     Former smoker      Full code status 2017     Glaucoma      HLD (hyperlipidemia)      HTN (hypertension)      Legally blind      Macular degeneration      Microalbuminuria      PN (peripheral neuropathy)      Pubic bone fracture 2016    L     Stenosis of right carotid artery     Right 75%       Past Surgical History:   Procedure Laterality Date     APPENDECTOMY       CAROTID ENDARTERECTOMY Right      CHOLECYSTECTOMY      Early 30's.  Biopsy of liver.     shock tx      50 years ago fo paranoid schizophrenia     TOTAL HIP ARTHROPLASTY Left      Family History   Problem Relation Age of Onset     Dementia Mother      In stolen car.  Left out (literally) to freeze to death at 88 years.     Kidney disease Father       of kidney disease.     Other Brother      Only recently learned that he existed.  Health is unknown.     Social History     Social History     Marital status:      Spouse name: [place]     Number of children: N/A     Years of education: N/A     Occupational History      Retired     Social History Main Topics     Smoking status: Former Smoker     Quit date: 1987     Smokeless tobacco: Former User      Comment: Oral tobacco only briefly.  Smoked 34 years.     Alcohol use No      Comment: He has a history of issues with alcohol.     Drug use: No     Sexual activity: Not on file     Other Topics Concern     Not on file     Social History Narrative    Patient of Dr. Vela since .         Used to work in the [email or web address].        Uses a walker.  Goes to day program at [facility] for dementia.     Current Outpatient Prescriptions   Medication Sig Dispense Refill     amLODIPine (NORVASC) 10 MG tablet Take 10 mg by mouth daily.       glipiZIDE (GLUCOTROL) 5 MG tablet Take 2.5 mg by mouth daily with breakfast.       metoprolol tartrate (LOPRESSOR) 25 MG tablet Take 25 mg by mouth 2 (two) times a day.       multivitamin  (MULTIVITAMIN) per tablet Take 1 tablet by mouth daily.       pravastatin (PRAVACHOL) 20 MG tablet Take 20 mg by mouth at bedtime.       No current facility-administered medications for this visit.      No Known Allergies      Review of Systems:    Constitutional: Negative.  Negative for fever, chills, has activity change, appetite change and fatigue.   HENT: Negative for congestion and facial swelling.    Eyes: Negative for photophobia, redness and visual disturbance.   Respiratory: Negative for cough and chest tightness.    Cardiovascular: Negative for chest pain, palpitations and leg swelling.   Gastrointestinal: Negative for nausea, diarrhea, constipation, blood in stool and abdominal distention.   Genitourinary: Negative.    Musculoskeletal: Negative.    Skin: Negative.  Intact.   Neurological: Negative for dizziness, tremors, syncope, weakness, light-headedness and headaches.   Hematological: Does not bruise/bleed easily.   Psychiatric/Behavioral: Negative.  confused at baseline. Stable.    Vitals:    09/21/17 1002   BP: 150/79   Pulse: 71   Resp: 16   Temp: 97.3  F (36.3  C)   SpO2: 95%       Physical Exam:    GENERAL: no acute distress. Cooperative in conversation.   HEENT: pupils are equal, round and reactive. Oral mucosa is moist and intact.  Patient has some hearing loss and is legally blind  RESP:Chest symmetric. Regular respiratory rate. No stridor.  CVS: S1S2, no murmur  ABD: Nondistended, soft.  EXTREMITIES: No lower extremity edema.  Left-sided weakness with some spasticity of his hand noted uses a walker for ambulation  NEURO: non focal. Alert and oriented x3. Confused with a very poor recall  PSYCH: within normal limits. No depression or anxiety.  SKIN: warm dry intact       Labs:    No results found for this or any previous visit (from the past 240 hour(s)).  Lab Results   Component Value Date    HGBA1C 7.2 (H) 07/22/2017     Xr Chest Pa And Lateral  Result Date: 7/21/2017  XR CHEST PA AND LATERAL  7/21/2017 2:49 PM INDICATION: Aspiration COMPARISON: 7/8/2015 FINDINGS: Lung volumes are low with crowding of the pulmonary markings. No definite infiltrate or pleural effusion.    Encounter diagoses:    1. Alzheimer's disease of other onset without behavioral disturbance     2. Essential hypertension     3. Type 2 diabetes mellitus with both eyes affected by mild nonproliferative retinopathy without macular edema, without long-term current use of insulin     4. HLD (hyperlipidemia)     5. CKD (chronic kidney disease) stage 3, GFR 30-59 ml/min         1.  Alzheimer's dementia with initial testing slums of 1/30; CPT of 3.3/5.6  2.  Stable on BB and norvasc  3.  Diabetes type 2-discharge on oral Glucotrol and last A1c of 7.2  4.  On low intensity statin  5.  Cr 1.83 with GFR 36    The care plan has been reviewed and all orders signed. Changes to care plan, if any, as noted. Otherwise, continue care plan of care.     Electronically signed by: Aldo Solano NP  This progress note was completed using Dragon software and there may be grammatical errors.

## 2021-06-14 NOTE — PROGRESS NOTES
[facility]      Code Status:  FULL CODE  Visit Type: Review Of Multiple Medical Conditions     Facility:  [facility] [[id number]]        -2    History of Present Illness: Mo Swift is a 82 y.o. male who is currently admitted to LT on the memory care unit.  He was initially admitted to the TCU as a transfer from the hospital.  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  He was noted to have an aspiration event episode while at the  center.  He underwent a Heimlich maneuver and he was then sent to the emergency room.  His wife who also helps him as his caregiver was in the TCU herself during the same time.  His children were unable to meet his needs and they recommended placement in the TCU for him also for PT OT strengthening and rehab.  He did have a bedside swallow eval with no aspiration event noted and an x-ray chest was negative. He was subsequently discharged on his current medication regimen.  While in the TCU he did quite well with mood and behaviors remain stable though he tested extremely poorly cognitively indicating moderate to severe dementia.  His initial testing slums was 1/30 and a repeat CPT was 3.3/ 5.6. Currently he ambulates using a 4 wheeled walker but remains a high fall risk and 24 hour supervision.  Family has elected to move him into long-term care. BS still elevated even though initiating basaglar insulin last visit. Will now increase insulin to 15U in HS and monitor. He is still on glipizide 10mg XL with novolog SS.    Patient denies pain, headache, chest pain, numbness or tingling, shortest of breath, eating or swallowing concerns, nausea or vomiting, diarrhea or bowel abnormalities, or no new integumentary concerns today.    Past Medical History:    Diagnosis Date     Alzheimer's dementia      ASCVD (arteriosclerotic cardiovascular disease)      CAD (coronary artery disease)      Chronic diarrhea      CKD (chronic kidney disease) stage 3, GFR 30-59 ml/min      CVA (cerebral vascular accident)     , POST REPAIR OF LEFT HIP FRACTURE, RESIDUAL LEFT SIDE WEAKNESS       DM type 2 (diabetes mellitus, type 2)      Falls 2016    with L side pain     Former smoker      Full code status 2017     Glaucoma      HLD (hyperlipidemia)      HTN (hypertension)      Legally blind      Macular degeneration      Microalbuminuria      PN (peripheral neuropathy)      Pubic bone fracture 2016    L     Stenosis of right carotid artery     Right 75%       Past Surgical History:   Procedure Laterality Date     APPENDECTOMY       CAROTID ENDARTERECTOMY Right      CHOLECYSTECTOMY      Early s.  Biopsy of liver.     shock tx      50 years ago fo paranoid schizophrenia     TOTAL HIP ARTHROPLASTY Left      Family History   Problem Relation Age of Onset     Dementia Mother      In stolen car.  Left out (literally) to freeze to death at 88 years.     Kidney disease Father       of kidney disease.     Other Brother      Only recently learned that he existed.  Health is unknown.     Social History     Social History     Marital status:      Spouse name: [place]     Number of children: N/A     Years of education: N/A     Occupational History      Retired     Social History Main Topics     Smoking status: Former Smoker     Quit date: 1987     Smokeless tobacco: Former User      Comment: Oral tobacco only briefly.  Smoked 34 years.     Alcohol use No      Comment: He has a history of issues with alcohol.     Drug use: No     Sexual activity: Not on file     Other Topics Concern     Not on file     Social History Narrative    Patient of Dr. Vela since .         Used to work in the [employer].        Uses a walker.  Goes to day  program at [facility] for dementia.     Current Outpatient Prescriptions   Medication Sig Dispense Refill     insulin glargine (LANTUS) 100 unit/mL injection Inject 15 Units under the skin at bedtime.       amLODIPine (NORVASC) 10 MG tablet Take 10 mg by mouth daily.       cholecalciferol, vitamin D3, 1,000 unit tablet Take 2,000 Units by mouth daily.       glipiZIDE (GLUCOTROL) 5 MG tablet Take 10 mg by mouth daily with breakfast.        metoprolol tartrate (LOPRESSOR) 25 MG tablet Take 25 mg by mouth 2 (two) times a day.       multivitamin (MULTIVITAMIN) per tablet Take 1 tablet by mouth daily.       pravastatin (PRAVACHOL) 20 MG tablet Take 20 mg by mouth at bedtime.       No current facility-administered medications for this visit.      No Known Allergies      Review of Systems:    Constitutional: Negative.  Negative for fever, chills, has activity change, appetite change and fatigue.   HENT: Negative for congestion and facial swelling.    Eyes: Negative for photophobia, redness and visual disturbance.   Respiratory: Negative for cough and chest tightness.    Cardiovascular: Negative for chest pain, palpitations and leg swelling.   Gastrointestinal: Negative for nausea, diarrhea, constipation, blood in stool and abdominal distention.   Genitourinary: Negative.    Musculoskeletal: Negative.    Skin: Negative.  Intact.   Neurological: Negative for dizziness, tremors, syncope, weakness, light-headedness and headaches.   Hematological: Does not bruise/bleed easily.   Psychiatric/Behavioral: Negative.  confused at baseline. Stable.    Vitals:    11/15/17 1416   BP: 141/72   Pulse: 62   Resp: 18   Temp: 98.1  F (36.7  C)   SpO2: 97%       Physical Exam:    GENERAL: no acute distress. Cooperative in conversation.   HEENT: pupils are equal, round and reactive. Oral mucosa is moist and intact.  Patient has some hearing loss and is legally blind.  RESP:Chest symmetric. Regular respiratory rate. No stridor.  CVS: S1S2,  no murmur  ABD: Nondistended, soft.  EXTREMITIES: No lower extremity edema.  Left-sided weakness with some spasticity of his hand noted uses a walker for ambulation  NEURO: non focal. Alert and oriented x3. Confused with a very poor recall.  PSYCH: within normal limits. No depression or anxiety.  SKIN: warm dry intact       Labs:    No results found for this or any previous visit (from the past 240 hour(s)).  Lab Results   Component Value Date    HGBA1C 8.7 (H) 11/01/2017     Xr Chest Pa And Lateral  Result Date: 7/21/2017  XR CHEST PA AND LATERAL 7/21/2017 2:49 PM INDICATION: Aspiration COMPARISON: 7/8/2015 FINDINGS: Lung volumes are low with crowding of the pulmonary markings. No definite infiltrate or pleural effusion.    Encounter diagoses:    1. Essential hypertension     2. CKD (chronic kidney disease) stage 3, GFR 30-59 ml/min     3. Type 2 diabetes mellitus with both eyes affected by mild nonproliferative retinopathy without macular edema, without long-term current use of insulin     4. Vitamin D deficiency         1.  Borderline, stable on BB and norvasc  2.  Last Cr 1.60 with GFR 40  3.  On glipizide 10mg XL and now increase lantus 15U and last A1c of 8.7  4.  Last vit D 28.4 on 11/1    The care plan has been reviewed and all orders signed. Changes to care plan, if any, as noted. Otherwise, continue care plan of care.     Electronically signed by: Aldo Solano NP  This progress note was completed using Dragon software and there may be grammatical errors.

## 2021-06-15 NOTE — PROGRESS NOTES
[facility]      Code Status:  FULL CODE  Visit Type: Review Of Multiple Medical Conditions     Facility:  [facility] [[id number]]        -2    History of Present Illness: Mo Swift is a 83 y.o. male who is currently admitted to LT on the memory care unit.  He was initially admitted to the TCU as a transfer from the hospital.  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  He was noted to have an aspiration event episode while at the  center.  He underwent a Heimlich maneuver and he was then sent to the emergency room.  His wife who also helps him as his caregiver was in the TCU herself during the same time.  His children were unable to meet his needs and they recommended placement in the TCU for him also for PT OT strengthening and rehab.  He did have a bedside swallow eval with no aspiration event noted and an x-ray chest was negative. He was subsequently discharged on his current medication regimen.  While in the TCU he did quite well with mood and behaviors remain stable though he tested extremely poorly cognitively indicating moderate to severe dementia.  His initial testing slums was 1/30 and a repeat CPT was 3.3/5.6. Currently he ambulates using a 4 wheeled walker but remains a high fall risk and 24 hour supervision.  Family has elected to move him into long-term care. BS still elevated even though initiating basaglar insulin, though now 80s in am, will decrease to 12U at HS, previously DC'd sliding scale insulin and start NovoLog 2 units and hold if  BS <140.  He is still on glipizide 10mg XL.    Patient denies pain, headache, chest pain, numbness or tingling, shortest of breath, eating or swallowing concerns, nausea or vomiting, diarrhea or bowel abnormalities, or no new  integumentary concerns today.    Past Medical History:   Diagnosis Date     Alzheimer's dementia      ASCVD (arteriosclerotic cardiovascular disease)      CAD (coronary artery disease)      Chronic diarrhea      CKD (chronic kidney disease) stage 3, GFR 30-59 ml/min      CVA (cerebral vascular accident)     , POST REPAIR OF LEFT HIP FRACTURE, RESIDUAL LEFT SIDE WEAKNESS       DM type 2 (diabetes mellitus, type 2)      Falls 2016    with L side pain     Former smoker      Full code status 2017     Glaucoma      HLD (hyperlipidemia)      HTN (hypertension)      Legally blind      Macular degeneration      Microalbuminuria      PN (peripheral neuropathy)      Pubic bone fracture 2016    L     Stenosis of right carotid artery     Right 75%       Past Surgical History:   Procedure Laterality Date     APPENDECTOMY       CAROTID ENDARTERECTOMY Right      CHOLECYSTECTOMY      Early s.  Biopsy of liver.     shock tx      50 years ago fo paranoid schizophrenia     TOTAL HIP ARTHROPLASTY Left      Family History   Problem Relation Age of Onset     Dementia Mother      In stolen car.  Left out (literally) to freeze to death at 88 years.     Kidney disease Father       of kidney disease.     Other Brother      Only recently learned that he existed.  Health is unknown.     Social History     Social History     Marital status:      Spouse name: [place]     Number of children: N/A     Years of education: N/A     Occupational History      Retired     Social History Main Topics     Smoking status: Former Smoker     Quit date: 1987     Smokeless tobacco: Former User      Comment: Oral tobacco only briefly.  Smoked 34 years.     Alcohol use No      Comment: He has a history of issues with alcohol.     Drug use: No     Sexual activity: Not on file     Other Topics Concern     Not on file     Social History Narrative    Patient of Dr. Vela since .         Used to work in the [employer].        Uses a walker.  Goes to day program at [facility] for dementia.     Current Outpatient Prescriptions   Medication Sig Dispense Refill     amLODIPine (NORVASC) 10 MG tablet Take 10 mg by mouth daily.       cholecalciferol, vitamin D3, 1,000 unit tablet Take 2,000 Units by mouth daily.       glipiZIDE (GLUCOTROL) 5 MG tablet Take 10 mg by mouth daily with breakfast.        insulin aspart (NOVOLOG) 100 unit/mL injection Inject 2 Units under the skin daily before supper. For BS >160        insulin glargine (LANTUS) 100 unit/mL injection Inject 15 Units under the skin at bedtime.       metoprolol tartrate (LOPRESSOR) 25 MG tablet Take 25 mg by mouth 2 (two) times a day.       multivitamin (MULTIVITAMIN) per tablet Take 1 tablet by mouth daily.       pravastatin (PRAVACHOL) 20 MG tablet Take 20 mg by mouth at bedtime.       No current facility-administered medications for this visit.      No Known Allergies    Vitals:    01/21/18 1239   BP: 128/70   Pulse: 78   Resp: 18   Temp: 97.9  F (36.6  C)   SpO2: 96%       Review of Systems:    Constitutional: Negative.  Negative for fever, chills, has activity change, appetite change and fatigue.   HENT: Negative for congestion and facial swelling.    Eyes: Negative for photophobia, redness and visual disturbance.   Respiratory: Negative for cough and chest tightness.    Cardiovascular: Negative for chest pain, palpitations and leg swelling.   Gastrointestinal: Negative for nausea, diarrhea, constipation, blood in stool and abdominal distention.   Genitourinary: Negative.    Musculoskeletal: Negative.    Skin: Negative.  Intact.   Neurological: Negative for dizziness, tremors, syncope, weakness, light-headedness and headaches.   Hematological: Does not bruise/bleed easily.   Psychiatric/Behavioral: Negative.  confused at baseline. Stable.      Physical Exam:    GENERAL: no acute distress. Cooperative in conversation.   HEENT: pupils are equal,  "round and reactive. Oral mucosa is moist and intact.  Patient has some hearing loss and is legally blind.  RESP:Chest symmetric. Regular respiratory rate. No stridor.  CVS: S1S2, no murmur  ABD: Nondistended, soft.  EXTREMITIES: No lower extremity edema.  Left-sided weakness with some spasticity of his hand noted uses a walker for ambulation  NEURO: non focal. Alert and oriented x3. Confused with a very poor recall.  PSYCH: within normal limits. No depression or anxiety.  SKIN: warm dry intact       Labs:    No results found for this or any previous visit (from the past 240 hour(s)).  Lab Results   Component Value Date    HGBA1C 8.7 (H) 11/01/2017     Xr Chest Pa And Lateral  Result Date: 7/21/2017  XR CHEST PA AND LATERAL 7/21/2017 2:49 PM INDICATION: Aspiration COMPARISON: 7/8/2015 FINDINGS: Lung volumes are low with crowding of the pulmonary markings. No definite infiltrate or pleural effusion.    Encounter diagoses:    1. Essential hypertension     2. CKD (chronic kidney disease) stage 3, GFR 30-59 ml/min     3. Type 2 diabetes mellitus with both eyes affected by mild nonproliferative retinopathy without macular edema, without long-term current use of insulin     4. Vitamin D deficiency     5. Alzheimer's disease of other onset without behavioral disturbance     6. Hyperlipidemia, unspecified hyperlipidemia type         1.  Borderline, stable on BB and norvasc, allowed some permissive hypertension  2.  Last Cr 1.60 with GFR 40  3.  On glipizide 10mg XL,decrease lantus to12U, now DC SS and add novolog 2U at noon for BS >140, last A1c of 8.7  4.  Last vit D 34.1 on 12/2017  5.  No behaviors, stable  6.  DC statin and multivitamin -according to a MDA and Choosing Wisely, the treatment of high cholesterol in patients over 75 years old may not be necessary and that \"and even less favorable risk-benefit ratio may be seen in patients over 85, where benefits may be more diminished and risks from statin drugs were " increased (cognitive impairment, falls, neuropathy, and muscle damage).      The care plan has been reviewed and all orders signed. Changes to care plan, if any, as noted. Otherwise, continue care plan of care.     Electronically signed by: Aldo Solano NP

## 2021-06-16 NOTE — PROGRESS NOTES
[facility]      Code Status:  FULL CODE  Visit Type: Review Of Multiple Medical Conditions     Facility:  [facility] [[id number]]        -2    History of Present Illness: Mo Swift is a 83 y.o. male who is currently admitted to LT on the memory care unit.  He was initially admitted to the TCU as a transfer from the hospital.  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  He was noted to have an aspiration event episode while at the  center.  He underwent a Heimlich maneuver and he was then sent to the emergency room.  His wife who also helps him as his caregiver was in the TCU herself during the same time.  His children were unable to meet his needs and they recommended placement in the TCU for him also for PT OT strengthening and rehab.  He did have a bedside swallow eval with no aspiration event noted and an x-ray chest was negative. He was subsequently discharged on his current medication regimen.  While in the TCU he did quite well with mood and behaviors remain stable though he tested extremely poorly cognitively indicating moderate to severe dementia.  His initial testing slums was 1/30 and a repeat CPT was 3.3/5.6. Currently he ambulates using a 4 wheeled walker but remains a high fall risk and 24 hour supervision. Recently per nursing he has been ambulating less though.   BS more controlled  After initiating basaglar insulin, though now 80s in am, will decrease to 10U at HS, previously DC'd sliding scale insulin and start NovoLog 2 units and hold if  BS <140.  He is still on glipizide 10mg XL. Will monitor BS TID for now.    Patient denies pain, headache, chest pain, numbness or tingling, shortest of breath, eating or swallowing concerns, nausea or vomiting, diarrhea or  bowel abnormalities, or no new integumentary concerns today.    Past Medical History:   Diagnosis Date     Alzheimer's dementia      ASCVD (arteriosclerotic cardiovascular disease)      CAD (coronary artery disease)      Chronic diarrhea      CKD (chronic kidney disease) stage 3, GFR 30-59 ml/min      CVA (cerebral vascular accident)     , POST REPAIR OF LEFT HIP FRACTURE, RESIDUAL LEFT SIDE WEAKNESS       DM type 2 (diabetes mellitus, type 2)      Falls 2016    with L side pain     Former smoker      Full code status 2017     Glaucoma      HLD (hyperlipidemia)      HTN (hypertension)      Legally blind      Macular degeneration      Microalbuminuria      PN (peripheral neuropathy)      Pubic bone fracture 2016    L     Stenosis of right carotid artery     Right 75%       Past Surgical History:   Procedure Laterality Date     APPENDECTOMY       CAROTID ENDARTERECTOMY Right      CHOLECYSTECTOMY      Early .  Biopsy of liver.     shock tx      50 years ago fo paranoid schizophrenia     TOTAL HIP ARTHROPLASTY Left      Family History   Problem Relation Age of Onset     Dementia Mother      In stolen car.  Left out (literally) to freeze to death at 88 years.     Kidney disease Father       of kidney disease.     Other Brother      Only recently learned that he existed.  Health is unknown.     Social History     Social History     Marital status:      Spouse name: [place]     Number of children: N/A     Years of education: N/A     Occupational History      Retired     Social History Main Topics     Smoking status: Former Smoker     Quit date: 1987     Smokeless tobacco: Former User      Comment: Oral tobacco only briefly.  Smoked 34 years.     Alcohol use No      Comment: He has a history of issues with alcohol.     Drug use: No     Sexual activity: Not on file     Other Topics Concern     Not on file     Social History Narrative    Patient of Dr. Vela since .          Used to work in the [employer].        Uses a walker.  Goes to day program at [facility] for dementia.     Current Outpatient Prescriptions   Medication Sig Dispense Refill     amLODIPine (NORVASC) 10 MG tablet Take 10 mg by mouth daily.       cholecalciferol, vitamin D3, 1,000 unit tablet Take 2,000 Units by mouth daily.       glipiZIDE (GLUCOTROL) 5 MG tablet Take 10 mg by mouth daily with breakfast.        insulin aspart (NOVOLOG) 100 unit/mL injection Inject 2 Units under the skin daily before supper. For BS >160        insulin glargine (LANTUS) 100 unit/mL injection Inject 10 Units under the skin at bedtime.        metoprolol tartrate (LOPRESSOR) 25 MG tablet Take 25 mg by mouth 2 (two) times a day.       No current facility-administered medications for this visit.      No Known Allergies    Vitals:    03/10/18 1234   BP: 128/74   Pulse: 76   Resp: 18   Temp: 97.8  F (36.6  C)   SpO2: 97%       Review of Systems:    Constitutional: Negative.  Negative for fever, chills, has activity change, appetite change and fatigue.   HENT: Negative for congestion and facial swelling.    Eyes: Negative for photophobia, redness and visual disturbance.   Respiratory: Negative for cough and chest tightness.    Cardiovascular: Negative for chest pain, palpitations and leg swelling.   Gastrointestinal: Negative for nausea, diarrhea, constipation, blood in stool and abdominal distention.   Genitourinary: Negative.    Musculoskeletal: Negative.    Skin: Negative.  Intact.   Neurological: Negative for dizziness, tremors, syncope, weakness, light-headedness and headaches.   Hematological: Does not bruise/bleed easily.   Psychiatric/Behavioral: Negative.  confused at baseline. Stable.      Physical Exam:    GENERAL: no acute distress. Cooperative.  Seems a bit more tired.  HEENT: pupils are equal, round and reactive. Oral mucosa is moist and intact.  Patient has some hearing loss and is legally  blind.  RESP:Chest symmetric. Regular respiratory rate. No stridor.  CVS: S1S2, no murmur, rub, or gallop.  ABD: Nondistended, soft. No constipation or diarrhea.  EXTREMITIES: No lower extremity edema.  Left-sided weakness with some spasticity of his hand noted, uses a walker for ambulation.  NEURO: non focal. Alert and oriented x3. Confused with a very poor recall.  PSYCH: within normal limits. No depression or anxiety.  SKIN: warm dry intact       Labs:    No results found for this or any previous visit (from the past 240 hour(s)).  Lab Results   Component Value Date    HGBA1C 8.7 (H) 11/01/2017     Xr Chest Pa And Lateral  Result Date: 7/21/2017  XR CHEST PA AND LATERAL 7/21/2017 2:49 PM INDICATION: Aspiration COMPARISON: 7/8/2015 FINDINGS: Lung volumes are low with crowding of the pulmonary markings. No definite infiltrate or pleural effusion.    Encounter diagoses:    1. Essential hypertension     2. CKD (chronic kidney disease) stage 3, GFR 30-59 ml/min     3. Type 2 diabetes mellitus with both eyes affected by mild nonproliferative retinopathy without macular edema, without long-term current use of insulin     4. Vitamin D deficiency     5. Alzheimer's disease of other onset without behavioral disturbance     6. Aspiration of food, sequela         1.  Borderline, stable on BB and norvasc, allowed some permissive hypertension.  2.  Last Cr 1.60 with GFR 40.  3.  On glipizide 10mg XL,decrease lantus to10U, previously DC'd SS and added novolog 2U at noon for BS >140, last A1c of 8.7. Will monitor BS TID for now.  4.  Last vit D 34.1 on 12/2017.  5.  No behaviors, stable.  6.  Continues on [facility] soft and regular consistency, no change.      The care plan has been reviewed and all orders signed. Changes to care plan, if any, as noted. Otherwise, continue care plan of care.     Electronically signed by: Aldo Solano NP

## 2021-06-16 NOTE — PROGRESS NOTES
[facility]      Code Status:  FULL CODE  Visit Type: Review Of Multiple Medical Conditions     Facility:  [facility] [[id number]]           History of Present Illness: Mo Swift is a 83 y.o. male who is currently a resident of [facility].    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  His initial testing slums was 1/30 and a repeat CPT was 3.3/ 5.6.  His current BMs is 3  To ambulate using a 4 wheeled walker but remains a high fall risk   He generally remains WC bound and does not do much-sleeps all day  No change in made when behavior reported.  He has lost a little bit of weight blood sugars were all low so his Lantus was decreased to 12 units he is taken off his multivitamin and statin.  Past Medical History:   Diagnosis Date     Alzheimer's dementia      ASCVD (arteriosclerotic cardiovascular disease)      CAD (coronary artery disease)      Chronic diarrhea      CKD (chronic kidney disease) stage 3, GFR 30-59 ml/min      CVA (cerebral vascular accident)     2010, POST REPAIR OF LEFT HIP FRACTURE, RESIDUAL LEFT SIDE WEAKNESS       DM type 2 (diabetes mellitus, type 2)      Falls 06/02/2016    with L side pain     Former smoker      Full code status 1/22/2017     Glaucoma      HLD (hyperlipidemia)      HTN (hypertension)      Legally blind      Macular degeneration      Microalbuminuria      PN (peripheral neuropathy)      Pubic bone fracture 06/02/2016    L     Stenosis of right carotid artery     Right 75%       Past Surgical History:   Procedure Laterality Date     APPENDECTOMY       CAROTID ENDARTERECTOMY Right      CHOLECYSTECTOMY      Early 30's.  Biopsy of liver.     shock tx      50 years ago fo paranoid  schizophrenia     TOTAL HIP ARTHROPLASTY Left      Family History   Problem Relation Age of Onset     Dementia Mother      In stolen car.  Left out (literally) to freeze to death at 88 years.     Kidney disease Father       of kidney disease.     Other Brother      Only recently learned that he existed.  Health is unknown.     Social History     Social History     Marital status:      Spouse name: [place]     Number of children: N/A     Years of education: N/A     Occupational History      Retired     Social History Main Topics     Smoking status: Former Smoker     Quit date: 1987     Smokeless tobacco: Former User      Comment: Oral tobacco only briefly.  Smoked 34 years.     Alcohol use No      Comment: He has a history of issues with alcohol.     Drug use: No     Sexual activity: Not on file     Other Topics Concern     Not on file     Social History Narrative    Patient of Dr. Vela since 2016.         Used to work in the [employer].        Uses a walker.  Goes to day program at [facility] for dementia.     Current Outpatient Prescriptions   Medication Sig Dispense Refill     amLODIPine (NORVASC) 10 MG tablet Take 10 mg by mouth daily.       cholecalciferol, vitamin D3, 1,000 unit tablet Take 2,000 Units by mouth daily.       glipiZIDE (GLUCOTROL) 5 MG tablet Take 10 mg by mouth daily with breakfast.        insulin aspart (NOVOLOG) 100 unit/mL injection Inject 2 Units under the skin daily before supper. For BS >160        insulin glargine (LANTUS) 100 unit/mL injection Inject 12 Units under the skin at bedtime.        metoprolol tartrate (LOPRESSOR) 25 MG tablet Take 25 mg by mouth 2 (two) times a day.       No current facility-administered medications for this visit.      No Known Allergies      Review of Systems:    Constitutional: Negative.  Negative for fever, chills, has activity change, appetite change and fatigue.   HENT: Negative for congestion and facial  swelling.    Eyes: Negative for photophobia, redness and visual disturbance.   Respiratory: Negative for cough and chest tightness.    Cardiovascular: Negative for chest pain, palpitations and leg swelling.   Gastrointestinal: Negative for nausea, diarrhea, constipation, blood in stool and abdominal distention.   Genitourinary: Negative.    Musculoskeletal: Negative.    Skin: Negative.    Neurological: Negative for dizziness, tremors, syncope, weakness, light-headedness and headaches.   Hematological: Does not bruise/bleed easily.   Psychiatric/Behavioral: Negative.  confused    Vitals:    02/14/18 1730   BP: 128/76   Pulse: 78   Temp: 98  F (36.7  C)       Physical Exam:    GENERAL: no acute distress. Cooperative in conversation.   HEENT: pupils are equal, round and reactive. Oral mucosa is moist and intact.  Patient has some hearing loss and is legally blind  RESP:Chest symmetric. Regular respiratory rate. No stridor.  CVS: S1S2  ABD: Nondistended, soft.  EXTREMITIES: No lower extremity edema.  Left-sided weakness with some spasticity of his hand noted uses a walker for ambulation  NEURO: non focal. Alert and oriented x3. Confused with a very poor recall  PSYCH: within normal limits. No depression or anxiety.  SKIN: warm dry intact       Labs:      Lab Results   Component Value Date    HGBA1C 8.7 (H) 11/01/2017     .  Assessment/Plan:    Alzheimer's dementia with initial testing slums of 1/30; CPT of 3.3/5.6 BIMS 3/15  Status post V CVA with left lower extremity weakness chronic  Diabetes type 2-  Chronic kidney disease  Legal blindness  Debilitation in a patient who remains a very high fall risk in light of his visual impairment and cognitive impairments  Hyperlipidemia o  Hypertension   Patient has adjusted well to long-term care with no significant behavioral issues noted  No recent falls most of the time he tends to sleep and remains wheelchair-bound  Lantus was reduced to 12 units due to lower blood sugars  most of them running under 200.  He has been taken off his statin and supplement.  Weights have been fluctuating and it appears he has lost a little bit of weight but he had gained some before continue to monitor intake as per nursing is good    Electronically signed by: ADELINE Baptiste  This progress note was completed using Dragon software and there may be grammatical errors.

## 2021-06-18 NOTE — LETTER
Letter by Adelaida Woods MBBS at      Author: Adelaida Woods MBBS Service: -- Author Type: --    Filed:  Encounter Date: 3/4/2019 Status: (Other)         Patient: Mo Swift   MR Number: [id number]   YOB: 1934   Date of Visit: 3/4/2019     [facility]      Code Status:  FULL CODE  Visit Type: Review Of Multiple Medical Conditions     Facility:  [facility] [[id number]]           History of Present Illness: Mo Swift is a 84 y.o. male who is currently a resident of [facility].    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  His initial testing slums was 1/30 and a repeat CPT was 3.3/ 5.6.  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  This has been a recurrent problem with him and he has been treated for UTIs but every time the cultures growing the same organism at present he is in isolation but a decision has been made not to treat him  He has lost 10 pounds in the interim due to poor oral intake.  Patient is a diabetic who was recently started on his medications due to high blood sugars.  His blood sugar record was reviewed and a.m. blood sugars and postprandials are around 140 except at lunchtime but he is being fed and blood sugars are consistently over 200  He is also on a low-dose of medication for hypertension  Overall is declining and wheelchair-bound and sleeping all day  Past Medical History:   Diagnosis Date   ? Alzheimer's dementia    ? ASCVD (arteriosclerotic cardiovascular disease)    ? CAD (coronary artery disease)    ? Chronic diarrhea    ? CKD (chronic kidney disease) stage 3, GFR 30-59 ml/min    ? CVA (cerebral vascular accident)     2010, POST REPAIR OF LEFT HIP FRACTURE,  RESIDUAL LEFT SIDE WEAKNESS     ? DM type 2 (diabetes mellitus, type 2)    ? Falls 2016    with L side pain   ? Former smoker    ? Full code status 2017   ? Glaucoma    ? HLD (hyperlipidemia)    ? HTN (hypertension)    ? Legally blind    ? Macular degeneration    ? Microalbuminuria    ? PN (peripheral neuropathy)    ? Pubic bone fracture (H) 2016    L   ? Stenosis of right carotid artery     Right 75%       Past Surgical History:   Procedure Laterality Date   ? APPENDECTOMY     ? CAROTID ENDARTERECTOMY Right    ? CHOLECYSTECTOMY      Early .  Biopsy of liver.   ? shock tx      50 years ago fo paranoid schizophrenia   ? TOTAL HIP ARTHROPLASTY Left      Family History   Problem Relation Age of Onset   ? Dementia Mother         In stolen car.  Left out (literally) to freeze to death at 88 years.   ? Kidney disease Father          of kidney disease.   ? Other Brother         Only recently learned that he existed.  Health is unknown.     Social History     Socioeconomic History   ? Marital status:      Spouse name: [place]   ? Number of children: Not on file   ? Years of education: Not on file   ? Highest education level: Not on file   Occupational History     Employer: RETIRED   Social Needs   ? Financial resource strain: Not on file   ? Food insecurity:     Worry: Not on file     Inability: Not on file   ? Transportation needs:     Medical: Not on file     Non-medical: Not on file   Tobacco Use   ? Smoking status: Former Smoker     Last attempt to quit: 1987     Years since quittin.7   ? Smokeless tobacco: Former User   ? Tobacco comment: Oral tobacco only briefly.  Smoked 34 years.   Substance and Sexual Activity   ? Alcohol use: No     Comment: He has a history of issues with alcohol.   ? Drug use: No   ? Sexual activity: Not on file   Lifestyle   ? Physical activity:     Days per week: Not on file     Minutes per session: Not on file   ? Stress: Not on file    Relationships   ? Social connections:     Talks on phone: Not on file     Gets together: Not on file     Attends [employer] service: Not on file     Active member of club or organization: Not on file     Attends meetings of clubs or organizations: Not on file     Relationship status: Not on file   ? Intimate partner violence:     Fear of current or ex partner: Not on file     Emotionally abused: Not on file     Physically abused: Not on file     Forced sexual activity: Not on file   Other Topics Concern   ? Not on file   Social History Narrative    Patient of Dr. Vela since 2016.         Used to work in the [employer].        Uses a walker.  Goes to day program at [facility] for dementia.     Current Outpatient Medications   Medication Sig Dispense Refill   ? cholecalciferol, vitamin D3, 1,000 unit tablet Take 2,000 Units by mouth daily.     ? glipiZIDE (GLUCOTROL XL) 5 MG 24 hr tablet Take 5 mg by mouth daily with breakfast.     ? insulin aspart U-100 (NOVOLOG) 100 unit/mL injection Inject under the skin 3 (three) times a day with meals. <200                0U    201-249         2U    250-299         4U    300-349         6U    350-399         8U    400-450        10U    451<             12U            ? insulin aspart U-100 (NOVOLOG) 100 unit/mL injection Inject 4 Units under the skin daily with supper. Hold for  or if not eating >50% of meal            ? metoprolol tartrate (LOPRESSOR) 25 MG tablet Take 12.5 mg by mouth 2 (two) times a day. Hold for SBP <140 or HR <60             No current facility-administered medications for this visit.      No Known Allergies      Review of Systems:    Constitutional: Negative.  Negative for fever, chills, has activity change, appetite change and fatigue.   HENT: Negative for congestion and facial swelling.    Eyes: Negative for photophobia, redness and visual disturbance.   Respiratory: Negative for cough and chest tightness.     Cardiovascular: Negative for chest pain, palpitations and leg swelling.   Gastrointestinal: Negative for nausea, diarrhea, constipation, blood in stool and abdominal distention.   Genitourinary: Negative.    Musculoskeletal: Negative.    Has a fall recently reported by nursing also had ecchymosis of his wrist  Skin: Negative.    Neurological: Negative for dizziness, tremors, syncope, weakness, light-headedness and headaches.   Hematological: Does not bruise/bleed easily.   Psychiatric/Behavioral: Negative.  confused    Physical Exam:    Pressure  115/65 temp 98 pulse 72 weight is 140 pound  GENERAL: no acute distress. NON VERBAL; NON cooperative in conversation.   HEENT: pupils are equal, round and reactive. Oral mucosa is moist and intact.  Patient has some hearing loss and is legally blind  RESP:Chest symmetric. Regular respiratory rate. No stridor.  CVS: S1S2  ABD: Nondistended, soft.  EXTREMITIES: No lower extremity edema.  Left-sided weakness with some spasticity of his hand noted uses a walker for ambulation  NEURO: non focal. Alert and oriented xself. Confused with a very poor recall does not follow any commands any longer  PSYCH: within normal limits. No depression or anxiety.  SKIN: warm dry intact     Labs:      Lab Results   Component Value Date    HGBA1C 8.0 (H) 12/26/2018     Results for orders placed or performed in visit on 05/14/18   Basic Metabolic Panel   Result Value Ref Range    Sodium 138 136 - 145 mmol/L    Potassium 4.4 3.5 - 5.0 mmol/L    Chloride 105 98 - 107 mmol/L    CO2 25 22 - 31 mmol/L    Anion Gap, Calculation 8 5 - 18 mmol/L    Glucose 143 (H) 70 - 125 mg/dL    Calcium 8.6 8.5 - 10.5 mg/dL    BUN 37 (H) 8 - 28 mg/dL    Creatinine 1.81 (H) 0.70 - 1.30 mg/dL    GFR MDRD Af Amer 44 (L) >60 mL/min/1.73m2    GFR MDRD Non Af Amer 36 (L) >60 mL/min/1.73m2       .  Assessment/Plan:    Alzheimer's dementia with initial testing slums of 1/30; CPT of 3.3/5.6 BIMS 3/15  Status post V CVA with  left lower extremity weakness chronic  Diabetes type 2-uncontrolled  Chronic kidney disease  Legal blindness  Dysphagia  Debilitation in a patient who remains a very high fall risk in light of his visual impairment and cognitive impairments  Hyperlipidemia o  Hypertension with hypotension in this elderly gentleman  Weight gain of 10 pounds  Patient is in long-term care with no significant behavioral issues noted   recent falls but no injury. most of the time he tends to sleep and remains wheelchair-bound.  He is ambulating less and sleeping more   Diabetic control was assessed his last A1c was high at 8 however in light of the fact that he is eating less he has been taken off his scheduled NovoLog with meals  He continues on NovoLog 4 units at p.m.  Now on a lower dose of metoprolol due to hypotension at 12.5 Norvasc has been discontinued.  He has lost 10 pounds and is being monitored closely and being fed during meals at least once during the day.  Continue to monitor  I did talk to the  overall he is declining and have suspected that he is going to have advanced dementia so we are requesting family to reassess his goals of care and consider switching him to DNR/DNI CODE STATUS  to contact family    Electronically signed by: ADELINE Baptiste  This progress note was completed using Dragon software and there may be grammatical errors.

## 2021-06-18 NOTE — LETTER
Letter by Adelaida Woods MBBS at      Author: Adelaida Woods MBBS Service: -- Author Type: --    Filed:  Encounter Date: 1/14/2019 Status: (Other)         Patient: Mo Swift   MR Number: [id number]   YOB: 1934   Date of Visit: 1/14/2019     [facility]      Code Status:  FULL CODE  Visit Type: Review Of Multiple Medical Conditions     Facility:  [facility] [[id number]]           History of Present Illness: Mo Swift is a 84 y.o. male who is currently a resident of [facility].    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  His initial testing slums was 1/30 and a repeat CPT was 3.3/ 5.6.  His current BMs is 3  To ambulate using a 4 wheeled walker but remains a high fall risk .  He recently had a fall but no injuries  He generally remains WC bound and does not do much-sleeps all day  No change in mood and behavior   He is a diabetic and his last A1c is 8.  Blood sugar log was reviewed and he is higher in the postprandial range especially at lunch when he is running around 350+.  He is being fed during this time he also remains on Ensure supplementation  Blood pressures were reviewed and they have been running low.  He has gained greater than 10 pounds of weight recently  Past Medical History:   Diagnosis Date   ? Alzheimer's dementia    ? ASCVD (arteriosclerotic cardiovascular disease)    ? CAD (coronary artery disease)    ? Chronic diarrhea    ? CKD (chronic kidney disease) stage 3, GFR 30-59 ml/min    ? CVA (cerebral vascular accident)     2010, POST REPAIR OF LEFT HIP FRACTURE, RESIDUAL LEFT SIDE WEAKNESS     ? DM type 2 (diabetes mellitus, type 2)    ? Falls 06/02/2016    with L side pain   ? Former  smoker    ? Full code status 2017   ? Glaucoma    ? HLD (hyperlipidemia)    ? HTN (hypertension)    ? Legally blind    ? Macular degeneration    ? Microalbuminuria    ? PN (peripheral neuropathy)    ? Pubic bone fracture (H) 2016    L   ? Stenosis of right carotid artery     Right 75%       Past Surgical History:   Procedure Laterality Date   ? APPENDECTOMY     ? CAROTID ENDARTERECTOMY Right    ? CHOLECYSTECTOMY      Early 30's.  Biopsy of liver.   ? shock tx      50 years ago fo paranoid schizophrenia   ? TOTAL HIP ARTHROPLASTY Left      Family History   Problem Relation Age of Onset   ? Dementia Mother         In stolen car.  Left out (literally) to freeze to death at 88 years.   ? Kidney disease Father          of kidney disease.   ? Other Brother         Only recently learned that he existed.  Health is unknown.     Social History     Socioeconomic History   ? Marital status:      Spouse name: [place]   ? Number of children: Not on file   ? Years of education: Not on file   ? Highest education level: Not on file   Social Needs   ? Financial resource strain: Not on file   ? Food insecurity - worry: Not on file   ? Food insecurity - inability: Not on file   ? Transportation needs - medical: Not on file   ? Transportation needs - non-medical: Not on file   Occupational History     Employer: RETIRED   Tobacco Use   ? Smoking status: Former Smoker     Last attempt to quit: 1987     Years since quittin.6   ? Smokeless tobacco: Former User   ? Tobacco comment: Oral tobacco only briefly.  Smoked 34 years.   Substance and Sexual Activity   ? Alcohol use: No     Comment: He has a history of issues with alcohol.   ? Drug use: No   ? Sexual activity: Not on file   Other Topics Concern   ? Not on file   Social History Narrative    Patient of Dr. Vela since .         Used to work in the [employer].        Uses a walker.  Goes to day program at [facility] for  dementia.     Current Outpatient Medications   Medication Sig Dispense Refill   ? amLODIPine (NORVASC) 10 MG tablet Take 10 mg by mouth daily.     ? cholecalciferol, vitamin D3, 1,000 unit tablet Take 2,000 Units by mouth daily.     ? insulin aspart U-100 (NOVOLOG) 100 unit/mL injection Inject under the skin 3 (three) times a day with meals. <200                0U    201-249         2U    250-299         4U    300-349         6U    350-399         8U    400-450        10U    451<             12U            ? insulin aspart U-100 (NOVOLOG) 100 unit/mL injection Inject 10 Units under the skin daily with supper Hold for  or if not eating >50% of meal .           ? insulin aspart U-100 (NOVOLOG) 100 unit/mL injection Inject 8 Units under the skin daily with lunch.            ? metoprolol tartrate (LOPRESSOR) 25 MG tablet Take 25 mg by mouth 2 (two) times a day.       No current facility-administered medications for this visit.      No Known Allergies      Review of Systems:    Constitutional: Negative.  Negative for fever, chills, has activity change, appetite change and fatigue.   HENT: Negative for congestion and facial swelling.    Eyes: Negative for photophobia, redness and visual disturbance.   Respiratory: Negative for cough and chest tightness.    Cardiovascular: Negative for chest pain, palpitations and leg swelling.   Gastrointestinal: Negative for nausea, diarrhea, constipation, blood in stool and abdominal distention.   Genitourinary: Negative.    Musculoskeletal: Negative.    Has a fall recently reported by nursing also had ecchymosis of his wrist  Skin: Negative.    Neurological: Negative for dizziness, tremors, syncope, weakness, light-headedness and headaches.   Hematological: Does not bruise/bleed easily.   Psychiatric/Behavioral: Negative.  confused    Physical Exam:    Pressure 92/48 recheck was 105/65 temp 98 pulse 72 weight is 152 pounds up by 10 pounds from his last weight  GENERAL: no acute  distress. Cooperative in conversation.   HEENT: pupils are equal, round and reactive. Oral mucosa is moist and intact.  Patient has some hearing loss and is legally blind  RESP:Chest symmetric. Regular respiratory rate. No stridor.  CVS: S1S2  ABD: Nondistended, soft.  EXTREMITIES: No lower extremity edema.  Left-sided weakness with some spasticity of his hand noted uses a walker for ambulation  NEURO: non focal. Alert and oriented xself. Confused with a very poor recall does not follow any commands any longer  PSYCH: within normal limits. No depression or anxiety.  SKIN: warm dry intact     Labs:      Lab Results   Component Value Date    HGBA1C 8.0 (H) 12/26/2018     Results for orders placed or performed in visit on 05/14/18   Basic Metabolic Panel   Result Value Ref Range    Sodium 138 136 - 145 mmol/L    Potassium 4.4 3.5 - 5.0 mmol/L    Chloride 105 98 - 107 mmol/L    CO2 25 22 - 31 mmol/L    Anion Gap, Calculation 8 5 - 18 mmol/L    Glucose 143 (H) 70 - 125 mg/dL    Calcium 8.6 8.5 - 10.5 mg/dL    BUN 37 (H) 8 - 28 mg/dL    Creatinine 1.81 (H) 0.70 - 1.30 mg/dL    GFR MDRD Af Amer 44 (L) >60 mL/min/1.73m2    GFR MDRD Non Af Amer 36 (L) >60 mL/min/1.73m2       .  Assessment/Plan:    Alzheimer's dementia with initial testing slums of 1/30; CPT of 3.3/5.6 BIMS 3/15  Status post V CVA with left lower extremity weakness chronic  Diabetes type 2-uncontrolled with recent worsening all postprandial blood sugars are more than 350  Chronic kidney disease  Legal blindness  Dysphagia  Debilitation in a patient who remains a very high fall risk in light of his visual impairment and cognitive impairments  Hyperlipidemia o  Hypertension with hypotension in this elderly gentleman  Weight gain of 10 pounds  Patient is in long-term care with no significant behavioral issues noted   recent falls but no injury. most of the time he tends to sleep and remains wheelchair-bound.  He is ambulating less and sleeping more and    Diabetic management was reviewed both with nursing as well as podiatry in the presence of patient it seems he is on supplements as well as being fed at mealtimes  Plan is to discontinue his supplementation due to significant weight gain of 10 pounds  In the meantime start glipizide 5 mg in the morning with breakfast  He has dysphagia and all his pills are being crushed  Monitor blood pressures closely we will discontinue amlodipine and continue with his metoprolol for now  Care plan also reviewed with dietary his BMI is more than 27.  I think he is okay at his current weight and does not need any supplementation.  Since he is being fed he has gained some weight so I think we should not be giving any supplements which are causing him a general disservice  Electronically signed by: ADELINE Baptiste  This progress note was completed using Dragon software and there may be grammatical errors.

## 2021-06-18 NOTE — LETTER
Letter by Aldo Solano NP at      Author: Aldo Solano NP Service: -- Author Type: --    Filed:  Encounter Date: 2/21/2019 Status: (Other)         Patient: Mo Swift   MR Number: [id number]   YOB: 1934   Date of Visit: 2/21/2019     [facility]      Code Status:  FULL CODE  Visit Type: Review Of Multiple Medical Conditions     Facility:  [facility] [[id number]]         301    History of Present Illness: Mo Swift is a 84 y.o. male who is currently admitted to [facility] on the memory care unit.  He was initially admitted to the TCU as a transfer from the hospital.  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  He was noted to have an aspiration event episode while at the  center.  He underwent a Heimlich maneuver and he was then sent to the emergency room.  His wife who also helps him as his caregiver was in the TCU herself during the same time.  His children were unable to meet his needs and they recommended placement in the TCU for him also for PT OT strengthening and rehab.  He did have a bedside swallow eval with no aspiration event noted and an x-ray chest was negative. He was subsequently discharged on his current medication regimen.      While in the TCU he did quite well with mood and behaviors remain stable though he tested extremely poorly cognitively indicating moderate to severe dementia.  His initial testing slums was 1/30 and a repeat CPT was 3.3. Currently he ambulates using a 4 wheeled walker but remains a high fall risk and 24 hour supervision. Recently per nursing he has been ambulating less though, mostly wc bound.  BS were more controlled after initiating basaglar insulin, though BS were consistently 70s in am, lantus discontinued.  Previously glipizide was discontinued per worsening renal fx, though it has been restarted without renal fx recheck. BSs variable, frequently being held at noon and PM time.    Patient denies pain, headache, chest pain, numbness or tingling, shortest of breath, eating or swallowing concerns, nausea or vomiting, diarrhea or bowel abnormalities, or no new integumentary concerns today.    Past Medical History:   Diagnosis Date   ? Alzheimer's dementia    ? ASCVD (arteriosclerotic cardiovascular disease)    ? CAD (coronary artery disease)    ? Chronic diarrhea    ? CKD (chronic kidney disease) stage 3, GFR 30-59 ml/min    ? CVA (cerebral vascular accident)     , POST REPAIR OF LEFT HIP FRACTURE, RESIDUAL LEFT SIDE WEAKNESS     ? DM type 2 (diabetes mellitus, type 2)    ? Falls 2016    with L side pain   ? Former smoker    ? Full code status 2017   ? Glaucoma    ? HLD (hyperlipidemia)    ? HTN (hypertension)    ? Legally blind    ? Macular degeneration    ? Microalbuminuria    ? PN (peripheral neuropathy)    ? Pubic bone fracture (H) 2016    L   ? Stenosis of right carotid artery     Right 75%       Past Surgical History:   Procedure Laterality Date   ? APPENDECTOMY     ? CAROTID ENDARTERECTOMY Right    ? CHOLECYSTECTOMY      Early s.  Biopsy of liver.   ? shock tx      50 years ago fo paranoid schizophrenia   ? TOTAL HIP ARTHROPLASTY Left      Family History   Problem Relation Age of Onset   ? Dementia Mother         In stolen car.  Left out (literally) to freeze to death at 88 years.   ? Kidney disease Father          of kidney disease.   ? Other Brother         Only recently learned that he existed.  Health is unknown.     Social History     Socioeconomic History   ? Marital status:      Spouse name: [place]   ? Number of children: Not on file   ? Years of education: Not on file   ? Highest education level: Not on file   Occupational History     Employer: RETIRED   Social Needs    ? Financial resource strain: Not on file   ? Food insecurity:     Worry: Not on file     Inability: Not on file   ? Transportation needs:     Medical: Not on file     Non-medical: Not on file   Tobacco Use   ? Smoking status: Former Smoker     Last attempt to quit: 1987     Years since quittin.7   ? Smokeless tobacco: Former User   ? Tobacco comment: Oral tobacco only briefly.  Smoked 34 years.   Substance and Sexual Activity   ? Alcohol use: No     Comment: He has a history of issues with alcohol.   ? Drug use: No   ? Sexual activity: Not on file   Lifestyle   ? Physical activity:     Days per week: Not on file     Minutes per session: Not on file   ? Stress: Not on file   Relationships   ? Social connections:     Talks on phone: Not on file     Gets together: Not on file     Attends [employer] service: Not on file     Active member of club or organization: Not on file     Attends meetings of clubs or organizations: Not on file     Relationship status: Not on file   ? Intimate partner violence:     Fear of current or ex partner: Not on file     Emotionally abused: Not on file     Physically abused: Not on file     Forced sexual activity: Not on file   Other Topics Concern   ? Not on file   Social History Narrative    Patient of Dr. Vela since 2016.         Used to work in the [employer].        Uses a walker.  Goes to day program at [facility] for dementia.     Current Outpatient Medications   Medication Sig Dispense Refill   ? glipiZIDE (GLUCOTROL XL) 5 MG 24 hr tablet Take 5 mg by mouth daily with breakfast.     ? cholecalciferol, vitamin D3, 1,000 unit tablet Take 2,000 Units by mouth daily.     ? insulin aspart U-100 (NOVOLOG) 100 unit/mL injection Inject under the skin 3 (three) times a day with meals. <200                0U    201-249         2U    250-299         4U    300-349         6U    350-399         8U    400-450        10U    451<             12U            ?  insulin aspart U-100 (NOVOLOG) 100 unit/mL injection Inject 4 Units under the skin daily with supper. Hold for  or if not eating >50% of meal            ? metoprolol tartrate (LOPRESSOR) 25 MG tablet Take 12.5 mg by mouth 2 (two) times a day. Hold for SBP <140 or HR <60             No current facility-administered medications for this visit.      No Known Allergies    Review of Systems:    Constitutional: Negative.  Negative for fever, chills, has activity change, appetite change and fatigue. Denies pain. BSs variable.  HENT: Negative for congestion and facial swelling.    Eyes: Negative for photophobia, redness and visual disturbance.   Respiratory: Negative for cough and chest tightness.    Cardiovascular: Negative for chest pain, palpitations and leg swelling.   Gastrointestinal: Negative for nausea, diarrhea, constipation, blood in stool and abdominal distention.   Genitourinary: Negative.    Musculoskeletal: Negative.    Skin: Negative.  Intact.   Neurological: Negative for dizziness, tremors, syncope, weakness, light-headedness and headaches.   Hematological: Does not bruise/bleed easily.   Psychiatric/Behavioral: Negative.  confused at baseline. Stable.    Vitals:    02/21/19 1744   BP: 118/70   Pulse: 62   Resp: 16   Temp: 98  F (36.7  C)   SpO2: 96%       Physical Exam:    GENERAL: no acute distress. Cooperative.  Denies pain.   HEENT: pupils are equal, round and reactive. Oral mucosa is moist and intact. Patient has some hearing loss and is legally blind.  RESP:Chest symmetric. Regular respiratory rate. No stridor.  Dim, RA.  CVS: RRR, S1S2, no murmur, rub, or gallop.  ABD: Nondistended, soft. No constipation or diarrhea.  EXTREMITIES: No lower extremity edema.  Left-sided weakness.  More bed bound and uses wc occasionally.   NEURO: no focal deficits. Alert and oriented x1-2. Confused with a very poor recall.  Declining.   PSYCH: within normal limits. No depression or anxiety.  SKIN: warm dry intact        Labs:    Recent Results (from the past 240 hour(s))   Norovirus Group I and II Detection by RT-PCR   Result Value Ref Range    Norovirus I and II by LEIGH Not Detected NDET    Enteric Pathogen Comment       Testing performed by multiplexed, qualitative PCR using the [id number]   Urinalysis   Result Value Ref Range    Color, UA Yellow Colorless, Yellow, Straw, Light Yellow    Clarity, UA Turbid (!) Clear    Glucose, UA Negative Negative    Bilirubin, UA Negative Negative    Ketones, UA Negative Negative    Specific Gravity, UA 1.033 (H) 1.001 - 1.030    Blood, UA Small (!) Negative    pH, UA 6.0 4.5 - 8.0    Protein,  mg/dL (!) Negative mg/dL    Urobilinogen, UA 2.0 E.U./dL <2.0 E.U./dL, 2.0 E.U./dL    Nitrite, UA Negative Negative    Leukocytes, UA Large (!) Negative    Bacteria, UA Moderate (!) None Seen hpf    RBC, UA 5-10 (!) None Seen, 0-2 hpf    WBC, UA >100 (!) None Seen, 0-5 hpf    Squam Epithel, UA  (!) None Seen, 0-5 lpf    WBC Clumps Present (!) None Seen    Amorphous, UA Many (!) None Seen    Mucus, UA Few (!) None Seen lpf   Culture, Urine   Result Value Ref Range    Culture >100,000 col/ml Escherichia coli (!)      Lab Results   Component Value Date    HGBA1C 8.0 (H) 12/26/2018     Results for orders placed or performed in visit on 05/14/18   Basic Metabolic Panel   Result Value Ref Range    Sodium 138 136 - 145 mmol/L    Potassium 4.4 3.5 - 5.0 mmol/L    Chloride 105 98 - 107 mmol/L    CO2 25 22 - 31 mmol/L    Anion Gap, Calculation 8 5 - 18 mmol/L    Glucose 143 (H) 70 - 125 mg/dL    Calcium 8.6 8.5 - 10.5 mg/dL    BUN 37 (H) 8 - 28 mg/dL    Creatinine 1.81 (H) 0.70 - 1.30 mg/dL    GFR MDRD Af Amer 44 (L) >60 mL/min/1.73m2    GFR MDRD Non Af Amer 36 (L) >60 mL/min/1.73m2           Xr Chest Pa And Lateral  Result Date: 7/21/2017  XR CHEST PA AND LATERAL 7/21/2017 2:49 PM INDICATION: Aspiration COMPARISON: 7/8/2015 FINDINGS: Lung volumes are low with crowding of the pulmonary  markings. No definite infiltrate or pleural effusion.    Assessment/Plan:    1.  HTN: now hypotensive, metoprolol decreased to 12.5mg BID and norvasc has been discontinued  2.  Last Cr 1.81 with GFR 36 on 5/14/18, declined in fx  3.  DM: recently glipizide has been restarted though renal fx has not been rechecked, previously dc'd lantus per hypoglycemia, continue SS three times a day with meals and discontinue novolog at noon and decrease to 4U at PM, hold for BS >140, last A1c 8.0 on 12/26/18  4.  Vit D def: continue D3 2000U daily, last vit D 34.1 on 12/2017.  5.  Dementia: No behaviors, stable.  6.  Dysphagia: Continues on mech soft and regular consistency, no change.  7.  Acute hyperglycemia: rare for BS >300 now      The care plan has been reviewed and all orders signed. Changes to care plan, if any, as noted. Otherwise, continue care plan of care.  The total time spent with this patient was 35 minutes, with greater than 50% spent in counseling and coordination of care that included multiple issues per ongoing DM.     Electronically signed by: Aldo Solano NP

## 2021-06-18 NOTE — PROGRESS NOTES
[facility]      Code Status:  FULL CODE  Visit Type: Problem Visit     Facility:  [facility] [[id number]]        -2    History of Present Illness: Mo Swift is a 83 y.o. male who is currently admitted to LT on the memory care unit.  He was initially admitted to the TCU as a transfer from the hospital.  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  He was noted to have an aspiration event episode while at the  center.  He underwent a Heimlich maneuver and he was then sent to the emergency room.  His wife who also helps him as his caregiver was in the TCU herself during the same time.  His children were unable to meet his needs and they recommended placement in the TCU for him also for PT OT strengthening and rehab.  He did have a bedside swallow eval with no aspiration event noted and an x-ray chest was negative. He was subsequently discharged on his current medication regimen.      While in the TCU he did quite well with mood and behaviors remain stable though he tested extremely poorly cognitively indicating moderate to severe dementia.  His initial testing slums was 1/30 and a repeat CPT was 3.3/5.6. Currently he ambulates using a 4 wheeled walker but remains a high fall risk and 24 hour supervision. Recently per nursing he has been ambulating less though, mostly wc bound.  BS more controlled after initiating basaglar insulin, though now 70s in am, lantus discontinued. Previously glipizide was discontinued per worsening renal fx.  Has higher BS at noon/PM, will schedule novolog with SS and monitor.     Patient denies pain, headache, chest pain, numbness or tingling, shortest of breath, eating or swallowing concerns, nausea or vomiting, diarrhea or bowel abnormalities, or no  new integumentary concerns today.    Past Medical History:   Diagnosis Date     Alzheimer's dementia      ASCVD (arteriosclerotic cardiovascular disease)      CAD (coronary artery disease)      Chronic diarrhea      CKD (chronic kidney disease) stage 3, GFR 30-59 ml/min      CVA (cerebral vascular accident)     , POST REPAIR OF LEFT HIP FRACTURE, RESIDUAL LEFT SIDE WEAKNESS       DM type 2 (diabetes mellitus, type 2)      Falls 2016    with L side pain     Former smoker      Full code status 2017     Glaucoma      HLD (hyperlipidemia)      HTN (hypertension)      Legally blind      Macular degeneration      Microalbuminuria      PN (peripheral neuropathy)      Pubic bone fracture (H) 2016    L     Stenosis of right carotid artery     Right 75%       Past Surgical History:   Procedure Laterality Date     APPENDECTOMY       CAROTID ENDARTERECTOMY Right      CHOLECYSTECTOMY      Early .  Biopsy of liver.     shock tx      50 years ago fo paranoid schizophrenia     TOTAL HIP ARTHROPLASTY Left      Family History   Problem Relation Age of Onset     Dementia Mother      In stolen car.  Left out (literally) to freeze to death at 88 years.     Kidney disease Father       of kidney disease.     Other Brother      Only recently learned that he existed.  Health is unknown.     Social History     Social History     Marital status:      Spouse name: [place]     Number of children: N/A     Years of education: N/A     Occupational History      Retired     Social History Main Topics     Smoking status: Former Smoker     Quit date: 1987     Smokeless tobacco: Former User      Comment: Oral tobacco only briefly.  Smoked 34 years.     Alcohol use No      Comment: He has a history of issues with alcohol.     Drug use: No     Sexual activity: Not on file     Other Topics Concern     Not on file     Social History Narrative    Patient of Dr. Vela since .         Used to work in the  [employer].        Uses a walker.  Goes to day program at [facility] for dementia.     Current Outpatient Prescriptions   Medication Sig Dispense Refill     insulin aspart U-100 (NOVOLOG) 100 unit/mL injection Inject 4 Units under the skin twice a day with lunch and supper. Hold for  or if not eating >50% of meal       amLODIPine (NORVASC) 10 MG tablet Take 10 mg by mouth daily.       cholecalciferol, vitamin D3, 1,000 unit tablet Take 2,000 Units by mouth daily.       insulin aspart U-100 (NOVOLOG) 100 unit/mL injection Inject under the skin 3 (three) times a day before meals. <200                0U    201-249         2U    250-299         4U    300-349         6U    350-399         8U    400-450        10U    451<             12U       metoprolol tartrate (LOPRESSOR) 25 MG tablet Take 25 mg by mouth 2 (two) times a day.       No current facility-administered medications for this visit.      No Known Allergies    Review of Systems:    Constitutional: Negative.  Negative for fever, chills, has activity change, appetite change and fatigue. Denies pain. Higher BSs at noon and PM.   HENT: Negative for congestion and facial swelling.    Eyes: Negative for photophobia, redness and visual disturbance.   Respiratory: Negative for cough and chest tightness.    Cardiovascular: Negative for chest pain, palpitations and leg swelling.   Gastrointestinal: Negative for nausea, diarrhea, constipation, blood in stool and abdominal distention.   Genitourinary: Negative.    Musculoskeletal: Negative.    Skin: Negative.  Intact.   Neurological: Negative for dizziness, tremors, syncope, weakness, light-headedness and headaches.   Hematological: Does not bruise/bleed easily.   Psychiatric/Behavioral: Negative.  confused at baseline. Stable.    Vitals:    06/21/18 1922   BP: 150/80   Pulse: (!) 57   Resp: 20   Temp: 98  F (36.7  C)   SpO2: 96%       Physical Exam:    GENERAL: no acute distress. Cooperative.   Denies pain.   HEENT: pupils are equal, round and reactive. Oral mucosa is moist and intact.  Patient has some hearing loss and is legally blind.  RESP:Chest symmetric. Regular respiratory rate. No stridor.  CVS: S1S2, no murmur, rub, or gallop.  ABD: Nondistended, soft. No constipation or diarrhea.  EXTREMITIES: No lower extremity edema.  Left-sided weakness.  More bed bound and uses wc occasionally.   NEURO: no focal deficits. Alert and oriented x2-3. Confused with a very poor recall.  PSYCH: within normal limits. No depression or anxiety.  SKIN: warm dry intact       Labs:    No results found for this or any previous visit (from the past 240 hour(s)).  Lab Results   Component Value Date    HGBA1C 6.9 (H) 05/14/2018     Xr Chest Pa And Lateral  Result Date: 7/21/2017  XR CHEST PA AND LATERAL 7/21/2017 2:49 PM INDICATION: Aspiration COMPARISON: 7/8/2015 FINDINGS: Lung volumes are low with crowding of the pulmonary markings. No definite infiltrate or pleural effusion.    Assessment/Plan:    1.  HTN: Borderline, stable on metoprolol 25mg BID and norvasc 10mg daily, allowed some permissive hypertension.  2.  Last Cr 1.81 with GFR 36, declined in fx  3.  DM: today dc glipizide per worsening renal fx, previously dc'd lantus per hypoglycemia, continue SS three times a day with meals and added novolog 4U at noon and PM, hold for BS >140, last A1c of 6.9 (improved).   4.  Vit D def: continue D3 2000U daily, last vit D 34.1 on 12/2017.  5.  Dementia: No behaviors, stable.  6.  Dysphagia: Continues on mech soft and regular consistency, no change.      The care plan has been reviewed and all orders signed. Changes to care plan, if any, as noted. Otherwise, continue care plan of care.     Electronically signed by: Aldo Solano NP

## 2021-06-18 NOTE — PROGRESS NOTES
[facility]      Code Status:  FULL CODE  Visit Type: Problem Visit     Facility:  [facility] [[id number]]        -2    History of Present Illness: Mo Swift is a 83 y.o. male who is currently admitted to LT on the memory care unit.  He was initially admitted to the TCU as a transfer from the hospital.  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  He was noted to have an aspiration event episode while at the  center.  He underwent a Heimlich maneuver and he was then sent to the emergency room.  His wife who also helps him as his caregiver was in the TCU herself during the same time.  His children were unable to meet his needs and they recommended placement in the TCU for him also for PT OT strengthening and rehab.  He did have a bedside swallow eval with no aspiration event noted and an x-ray chest was negative. He was subsequently discharged on his current medication regimen.      While in the TCU he did quite well with mood and behaviors remain stable though he tested extremely poorly cognitively indicating moderate to severe dementia.  His initial testing slums was 1/30 and a repeat CPT was 3.3/5.6. Currently he ambulates using a 4 wheeled walker but remains a high fall risk and 24 hour supervision. Recently per nursing he has been ambulating less though, mostly wc bound.  BS more controlled after initiating basaglar insulin, though now 80s in am, lantus discontinued. Previously glipizide was discontinued per worsening renal fx.  Will monitor BS TID for now with SS and monitor.     Patient denies pain, headache, chest pain, numbness or tingling, shortest of breath, eating or swallowing concerns, nausea or vomiting, diarrhea or bowel abnormalities, or no new integumentary  concerns today.    Past Medical History:   Diagnosis Date     Alzheimer's dementia      ASCVD (arteriosclerotic cardiovascular disease)      CAD (coronary artery disease)      Chronic diarrhea      CKD (chronic kidney disease) stage 3, GFR 30-59 ml/min      CVA (cerebral vascular accident)     , POST REPAIR OF LEFT HIP FRACTURE, RESIDUAL LEFT SIDE WEAKNESS       DM type 2 (diabetes mellitus, type 2)      Falls 2016    with L side pain     Former smoker      Full code status 2017     Glaucoma      HLD (hyperlipidemia)      HTN (hypertension)      Legally blind      Macular degeneration      Microalbuminuria      PN (peripheral neuropathy)      Pubic bone fracture 2016    L     Stenosis of right carotid artery     Right 75%       Past Surgical History:   Procedure Laterality Date     APPENDECTOMY       CAROTID ENDARTERECTOMY Right      CHOLECYSTECTOMY      Early .  Biopsy of liver.     shock tx      50 years ago fo paranoid schizophrenia     TOTAL HIP ARTHROPLASTY Left      Family History   Problem Relation Age of Onset     Dementia Mother      In stolen car.  Left out (literally) to freeze to death at 88 years.     Kidney disease Father       of kidney disease.     Other Brother      Only recently learned that he existed.  Health is unknown.     Social History     Social History     Marital status:      Spouse name: [place]     Number of children: N/A     Years of education: N/A     Occupational History      Retired     Social History Main Topics     Smoking status: Former Smoker     Quit date: 1987     Smokeless tobacco: Former User      Comment: Oral tobacco only briefly.  Smoked 34 years.     Alcohol use No      Comment: He has a history of issues with alcohol.     Drug use: No     Sexual activity: Not on file     Other Topics Concern     Not on file     Social History Narrative    Patient of Dr. Vela since .         Used to work in the [employer].        Uses a walker.  Goes to day program at [facility] for dementia.     Current Outpatient Prescriptions   Medication Sig Dispense Refill     insulin aspart U-100 (NOVOLOG) 100 unit/mL injection Inject under the skin 3 (three) times a day before meals. <200                0U    201-249         2U    250-299         4U    300-349         6U    350-399         8U    400-450        10U    451<             12U       amLODIPine (NORVASC) 10 MG tablet Take 10 mg by mouth daily.       cholecalciferol, vitamin D3, 1,000 unit tablet Take 2,000 Units by mouth daily.       metoprolol tartrate (LOPRESSOR) 25 MG tablet Take 25 mg by mouth 2 (two) times a day.       No current facility-administered medications for this visit.      No Known Allergies    Vitals:    06/11/18 1238   BP: 148/71   Pulse: 65   Resp: 18   Temp: 98  F (36.7  C)   SpO2: 96%       Review of Systems:    Constitutional: Negative.  Negative for fever, chills, has activity change, appetite change and fatigue. Denies pain. Higher BSs in afternoon.   HENT: Negative for congestion and facial swelling.    Eyes: Negative for photophobia, redness and visual disturbance.   Respiratory: Negative for cough and chest tightness.    Cardiovascular: Negative for chest pain, palpitations and leg swelling.   Gastrointestinal: Negative for nausea, diarrhea, constipation, blood in stool and abdominal distention.   Genitourinary: Negative.    Musculoskeletal: Negative.    Skin: Negative.  Intact.   Neurological: Negative for dizziness, tremors, syncope, weakness, light-headedness and headaches.   Hematological: Does not bruise/bleed easily.   Psychiatric/Behavioral: Negative.  confused at baseline. Stable.    Vitals:    06/11/18 1238   BP: 148/71   Pulse: 65   Resp: 18   Temp: 98  F (36.7  C)   SpO2: 96%       Physical Exam:    GENERAL: no acute distress. Cooperative.  Denies pain.   HEENT: pupils are equal, round and reactive. Oral mucosa is moist and  intact.  Patient has some hearing loss and is legally blind.  RESP:Chest symmetric. Regular respiratory rate. No stridor.  CVS: S1S2, no murmur, rub, or gallop.  ABD: Nondistended, soft. No constipation or diarrhea.  EXTREMITIES: No lower extremity edema.  Left-sided weakness.  More bed bound and uses wc occasionally.   NEURO: no focal deficits. Alert and oriented x2-3. Confused with a very poor recall.  PSYCH: within normal limits. No depression or anxiety.  SKIN: warm dry intact       Labs:    No results found for this or any previous visit (from the past 240 hour(s)).  Lab Results   Component Value Date    HGBA1C 6.9 (H) 05/14/2018     Xr Chest Pa And Lateral  Result Date: 7/21/2017  XR CHEST PA AND LATERAL 7/21/2017 2:49 PM INDICATION: Aspiration COMPARISON: 7/8/2015 FINDINGS: Lung volumes are low with crowding of the pulmonary markings. No definite infiltrate or pleural effusion.    Encounter diagoses:    1.  HTN: Borderline, stable on metoprolol 25mg BID and norvasc 10mg daily, allowed some permissive hypertension.  2.  Last Cr 1.81 with GFR 36, declined  3.  DM: today dc glipizide per worsening renal fx and will decrease lantus to7U, previously DC'd SS and added novolog 2U at noon for BS >140, last A1c of 6.9 (improved). Though now has BS +300-500 in evening. Will restart SS with three times a day monitoring with meals and monitor.  4.  Vit D def: continue D3 2000U daily, last vit D 34.1 on 12/2017.  5.  Dementia: No behaviors, stable.  6.  Dysphagia: Continues on mech soft and regular consistency, no change.      The care plan has been reviewed and all orders signed. Changes to care plan, if any, as noted. Otherwise, continue care plan of care.     Electronically signed by: Aldo Solano NP

## 2021-06-18 NOTE — PROGRESS NOTES
[facility]      Code Status:  FULL CODE  Visit Type: Review Of Multiple Medical Conditions     Facility:  [facility] [[id number]]        -2    History of Present Illness: Mo Swift is a 83 y.o. male who is currently admitted to LT on the memory care unit.  He was initially admitted to the TCU as a transfer from the hospital.  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  He was noted to have an aspiration event episode while at the  center.  He underwent a Heimlich maneuver and he was then sent to the emergency room.  His wife who also helps him as his caregiver was in the TCU herself during the same time.  His children were unable to meet his needs and they recommended placement in the TCU for him also for PT OT strengthening and rehab.  He did have a bedside swallow eval with no aspiration event noted and an x-ray chest was negative. He was subsequently discharged on his current medication regimen.      While in the TCU he did quite well with mood and behaviors remain stable though he tested extremely poorly cognitively indicating moderate to severe dementia.  His initial testing slums was 1/30 and a repeat CPT was 3.3/5.6. Currently he ambulates using a 4 wheeled walker but remains a high fall risk and 24 hour supervision. Recently per nursing he has been ambulating less though, mostly wc bound.  BS more controlled after initiating basaglar insulin, though now 80s in am, will decrease to 7U at HS, previously DC'd sliding scale insulin and start NovoLog 2 units and hold if  BS <140.  Today will have to dc glipizide per worsening renal fx.  Will monitor BS TID for now.    Patient denies pain, headache, chest pain, numbness or tingling, shortest of breath, eating or swallowing  concerns, nausea or vomiting, diarrhea or bowel abnormalities, or no new integumentary concerns today.    Past Medical History:   Diagnosis Date     Alzheimer's dementia      ASCVD (arteriosclerotic cardiovascular disease)      CAD (coronary artery disease)      Chronic diarrhea      CKD (chronic kidney disease) stage 3, GFR 30-59 ml/min      CVA (cerebral vascular accident)     , POST REPAIR OF LEFT HIP FRACTURE, RESIDUAL LEFT SIDE WEAKNESS       DM type 2 (diabetes mellitus, type 2)      Falls 2016    with L side pain     Former smoker      Full code status 2017     Glaucoma      HLD (hyperlipidemia)      HTN (hypertension)      Legally blind      Macular degeneration      Microalbuminuria      PN (peripheral neuropathy)      Pubic bone fracture 2016    L     Stenosis of right carotid artery     Right 75%       Past Surgical History:   Procedure Laterality Date     APPENDECTOMY       CAROTID ENDARTERECTOMY Right      CHOLECYSTECTOMY      Early .  Biopsy of liver.     shock tx      50 years ago fo paranoid schizophrenia     TOTAL HIP ARTHROPLASTY Left      Family History   Problem Relation Age of Onset     Dementia Mother      In stolen car.  Left out (literally) to freeze to death at 88 years.     Kidney disease Father       of kidney disease.     Other Brother      Only recently learned that he existed.  Health is unknown.     Social History     Social History     Marital status:      Spouse name: [place]     Number of children: N/A     Years of education: N/A     Occupational History      Retired     Social History Main Topics     Smoking status: Former Smoker     Quit date: 1987     Smokeless tobacco: Former User      Comment: Oral tobacco only briefly.  Smoked 34 years.     Alcohol use No      Comment: He has a history of issues with alcohol.     Drug use: No     Sexual activity: Not on file     Other Topics Concern     Not on file     Social History Narrative     Patient of Dr. Vela since 2016.         Used to work in the [employer].        Uses a walker.  Goes to day program at [facility] for dementia.     Current Outpatient Prescriptions   Medication Sig Dispense Refill     amLODIPine (NORVASC) 10 MG tablet Take 10 mg by mouth daily.       cholecalciferol, vitamin D3, 1,000 unit tablet Take 2,000 Units by mouth daily.       insulin aspart (NOVOLOG) 100 unit/mL injection Inject 2 Units under the skin daily before supper. For BS >160        insulin glargine (LANTUS) 100 unit/mL injection Inject 7 Units under the skin at bedtime.        metoprolol tartrate (LOPRESSOR) 25 MG tablet Take 25 mg by mouth 2 (two) times a day.       No current facility-administered medications for this visit.      No Known Allergies    Vitals:    05/19/18 1452   BP: 135/71   Pulse: 67   Resp: 18   Temp: 97.5  F (36.4  C)   SpO2: 92%       Review of Systems:    Constitutional: Negative.  Negative for fever, chills, has activity change, appetite change and fatigue.   HENT: Negative for congestion and facial swelling.    Eyes: Negative for photophobia, redness and visual disturbance.   Respiratory: Negative for cough and chest tightness.    Cardiovascular: Negative for chest pain, palpitations and leg swelling.   Gastrointestinal: Negative for nausea, diarrhea, constipation, blood in stool and abdominal distention.   Genitourinary: Negative.    Musculoskeletal: Negative.    Skin: Negative.  Intact.   Neurological: Negative for dizziness, tremors, syncope, weakness, light-headedness and headaches.   Hematological: Does not bruise/bleed easily.   Psychiatric/Behavioral: Negative.  confused at baseline. Stable.    Vitals:    05/19/18 1452   BP: 135/71   Pulse: 67   Resp: 18   Temp: 97.5  F (36.4  C)   SpO2: 92%       Physical Exam:    GENERAL: no acute distress. Cooperative.  Seems a bit more tired.  HEENT: pupils are equal, round and reactive. Oral mucosa is moist and  intact.  Patient has some hearing loss and is legally blind.  RESP:Chest symmetric. Regular respiratory rate. No stridor.  CVS: S1S2, no murmur, rub, or gallop.  ABD: Nondistended, soft. No constipation or diarrhea.  EXTREMITIES: No lower extremity edema.  Left-sided weakness with some spasticity of his hand noted, uses a walker for ambulation. More bed bound.  NEURO: non focal. Alert and oriented x3. Confused with a very poor recall.  PSYCH: within normal limits. No depression or anxiety.  SKIN: warm dry intact       Labs:    Recent Results (from the past 240 hour(s))   Basic Metabolic Panel   Result Value Ref Range    Sodium 138 136 - 145 mmol/L    Potassium 4.4 3.5 - 5.0 mmol/L    Chloride 105 98 - 107 mmol/L    CO2 25 22 - 31 mmol/L    Anion Gap, Calculation 8 5 - 18 mmol/L    Glucose 143 (H) 70 - 125 mg/dL    Calcium 8.6 8.5 - 10.5 mg/dL    BUN 37 (H) 8 - 28 mg/dL    Creatinine 1.81 (H) 0.70 - 1.30 mg/dL    GFR MDRD Af Amer 44 (L) >60 mL/min/1.73m2    GFR MDRD Non Af Amer 36 (L) >60 mL/min/1.73m2   Glycosylated Hemoglobin A1C   Result Value Ref Range    Hemoglobin A1c 6.9 (H) 4.2 - 6.1 %     Lab Results   Component Value Date    HGBA1C 6.9 (H) 05/14/2018     Xr Chest Pa And Lateral  Result Date: 7/21/2017  XR CHEST PA AND LATERAL 7/21/2017 2:49 PM INDICATION: Aspiration COMPARISON: 7/8/2015 FINDINGS: Lung volumes are low with crowding of the pulmonary markings. No definite infiltrate or pleural effusion.    Encounter diagoses:    1.  HTN: Borderline, stable on metoprolol 25mg BID and norvasc 10mg daily, allowed some permissive hypertension.  2.  Last Cr 1.81 with GFR 36, declined  3.  DM: today dc glipizide per worsening renal fx and will decrease lantus to7U, previously DC'd SS and added novolog 2U at noon for BS >140, last A1c of 6.9 (improved). Will monitor BS TID for now.  4.  Vit D def: continue D3 2000U daily, last vit D 34.1 on 12/2017.  5.  Dementia: No behaviors, stable.  6.  [facility], no change.      The care plan has been reviewed and all orders signed. Changes to care plan, if any, as noted. Otherwise, continue care plan of care.     Electronically signed by: Aldo Solano NP

## 2021-06-18 NOTE — PROGRESS NOTES
[facility]      Code Status:  FULL CODE  Visit Type: Review Of Multiple Medical Conditions     Facility:  [facility] [[id number]]           History of Present Illness: Mo Swift is a 83 y.o. male who is currently a resident of [facility].    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  His initial testing slums was 1/30 and a repeat CPT was 3.3/ 5.6.  His current BMs is 3  To ambulate using a 4 wheeled walker but remains a high fall risk   He generally remains WC bound and does not do much-sleeps all day  No change in made when behavior reported.    Past Medical History:   Diagnosis Date     Alzheimer's dementia      ASCVD (arteriosclerotic cardiovascular disease)      CAD (coronary artery disease)      Chronic diarrhea      CKD (chronic kidney disease) stage 3, GFR 30-59 ml/min      CVA (cerebral vascular accident)     2010, POST REPAIR OF LEFT HIP FRACTURE, RESIDUAL LEFT SIDE WEAKNESS       DM type 2 (diabetes mellitus, type 2)      Falls 06/02/2016    with L side pain     Former smoker      Full code status 1/22/2017     Glaucoma      HLD (hyperlipidemia)      HTN (hypertension)      Legally blind      Macular degeneration      Microalbuminuria      PN (peripheral neuropathy)      Pubic bone fracture (H) 06/02/2016    L     Stenosis of right carotid artery     Right 75%       Past Surgical History:   Procedure Laterality Date     APPENDECTOMY       CAROTID ENDARTERECTOMY Right      CHOLECYSTECTOMY      Early 30's.  Biopsy of liver.     shock tx      50 years ago fo paranoid schizophrenia     TOTAL HIP ARTHROPLASTY Left 2011     Family History   Problem Relation Age of Onset     Dementia Mother      In stolen car.  Left  out (literally) to freeze to death at 88 years.     Kidney disease Father       of kidney disease.     Other Brother      Only recently learned that he existed.  Health is unknown.     Social History     Social History     Marital status:      Spouse name: [place]     Number of children: N/A     Years of education: N/A     Occupational History      Retired     Social History Main Topics     Smoking status: Former Smoker     Quit date: 1987     Smokeless tobacco: Former User      Comment: Oral tobacco only briefly.  Smoked 34 years.     Alcohol use No      Comment: He has a history of issues with alcohol.     Drug use: No     Sexual activity: Not on file     Other Topics Concern     Not on file     Social History Narrative    Patient of Dr. Vela since 2016.         Used to work in the [employer].        Uses a walker.  Goes to day program at [facility] for dementia.     Current Outpatient Prescriptions   Medication Sig Dispense Refill     amLODIPine (NORVASC) 10 MG tablet Take 10 mg by mouth daily.       cholecalciferol, vitamin D3, 1,000 unit tablet Take 2,000 Units by mouth daily.       insulin aspart U-100 (NOVOLOG) 100 unit/mL injection Inject under the skin 3 (three) times a day before meals. <200                0U    201-249         2U    250-299         4U    300-349         6U    350-399         8U    400-450        10U    451<             12U       metoprolol tartrate (LOPRESSOR) 25 MG tablet Take 25 mg by mouth 2 (two) times a day.       No current facility-administered medications for this visit.      No Known Allergies      Review of Systems:    Constitutional: Negative.  Negative for fever, chills, has activity change, appetite change and fatigue.   HENT: Negative for congestion and facial swelling.    Eyes: Negative for photophobia, redness and visual disturbance.   Respiratory: Negative for cough and chest tightness.    Cardiovascular: Negative for chest pain,  palpitations and leg swelling.   Gastrointestinal: Negative for nausea, diarrhea, constipation, blood in stool and abdominal distention.   Genitourinary: Negative.    Musculoskeletal: Negative.    Skin: Negative.    Neurological: Negative for dizziness, tremors, syncope, weakness, light-headedness and headaches.   Hematological: Does not bruise/bleed easily.   Psychiatric/Behavioral: Negative.  confused    wts 143lb bp 134/73 p84    Physical Exam:    GENERAL: no acute distress. Cooperative in conversation.   HEENT: pupils are equal, round and reactive. Oral mucosa is moist and intact.  Patient has some hearing loss and is legally blind  RESP:Chest symmetric. Regular respiratory rate. No stridor.  CVS: S1S2  ABD: Nondistended, soft.  EXTREMITIES: No lower extremity edema.  Left-sided weakness with some spasticity of his hand noted uses a walker for ambulation  NEURO: non focal. Alert and oriented x3. Confused with a very poor recall  PSYCH: within normal limits. No depression or anxiety.  SKIN: warm dry intact       Labs:      Lab Results   Component Value Date    HGBA1C 6.9 (H) 05/14/2018     Results for orders placed or performed in visit on 05/14/18   Basic Metabolic Panel   Result Value Ref Range    Sodium 138 136 - 145 mmol/L    Potassium 4.4 3.5 - 5.0 mmol/L    Chloride 105 98 - 107 mmol/L    CO2 25 22 - 31 mmol/L    Anion Gap, Calculation 8 5 - 18 mmol/L    Glucose 143 (H) 70 - 125 mg/dL    Calcium 8.6 8.5 - 10.5 mg/dL    BUN 37 (H) 8 - 28 mg/dL    Creatinine 1.81 (H) 0.70 - 1.30 mg/dL    GFR MDRD Af Amer 44 (L) >60 mL/min/1.73m2    GFR MDRD Non Af Amer 36 (L) >60 mL/min/1.73m2       .  Assessment/Plan:    Alzheimer's dementia with initial testing slums of 1/30; CPT of 3.3/5.6 BIMS 3/15  Status post V CVA with left lower extremity weakness chronic  Diabetes type 2-  Chronic kidney disease  Legal blindness  Debilitation in a patient who remains a very high fall risk in light of his visual impairment and  cognitive impairments  Hyperlipidemia o  Hypertension   Patient has adjusted well to long-term care with no significant behavioral issues noted  No recent falls most of the time he tends to sleep and remains wheelchair-bound.  He is ambulating less and sleeping more and eating less his weights are down further  His diuretic regimen was adjusted due to low blood sugars and since then has been stable  Has had worsening of kidney functions .  No new changes made today    Electronically signed by: ADELINE Baptiste  This progress note was completed using Dragon software and there may be grammatical errors.

## 2021-06-19 NOTE — LETTER
Letter by Aldo Solano NP at      Author: Aldo Solano NP Service: -- Author Type: --    Filed:  Encounter Date: 6/20/2019 Status: (Other)         Patient: Mo Swift   MR Number: [id number]   YOB: 1934   Date of Visit: 6/20/2019     [facility]      Code Status:  FULL CODE  Visit Type: Review Of Multiple Medical Conditions     Facility:  [facility] [[id number]]         301    History of Present Illness: Mo Swift is a 84 y.o. male who is currently admitted to LT on the memory care unit.  He was initially admitted to the TCU as a transfer from the hospital.  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  He was noted to have an aspiration event episode while at the  center.  He underwent a Heimlich maneuver and he was then sent to the emergency room.  His wife who also helps him as his caregiver was in the TCU herself during the same time.  His children were unable to meet his needs and they recommended placement in the TCU for him also for PT OT strengthening and rehab.  He did have a bedside swallow eval with no aspiration event noted and an x-ray chest was negative. He was subsequently discharged on his current medication regimen.      He currently resides in LTC.  Recently per nursing he has been ambulating less though, mostly wc bound now.  BS are lower per decreased per lower oral intake. Previously glipizide was discontinued per worsening renal fx, though it has been restarted without renal fx recheck. Today have noticed renal fx is still decreased. Today will continue to titrate metoprolol as his Bps are lower now.    Patient denies pain, headache, chest pain, numbness or tingling, shortest of breath, eating or swallowing concerns, nausea  or vomiting, diarrhea or bowel abnormalities, or no new integumentary concerns today.    Past Medical History:   Diagnosis Date   ? Alzheimer's dementia    ? ASCVD (arteriosclerotic cardiovascular disease)    ? CAD (coronary artery disease)    ? Chronic diarrhea    ? CKD (chronic kidney disease) stage 3, GFR 30-59 ml/min    ? CVA (cerebral vascular accident)     , POST REPAIR OF LEFT HIP FRACTURE, RESIDUAL LEFT SIDE WEAKNESS     ? DM type 2 (diabetes mellitus, type 2)    ? Falls 2016    with L side pain   ? Former smoker    ? Full code status 2017   ? Glaucoma    ? HLD (hyperlipidemia)    ? HTN (hypertension)    ? Legally blind    ? Macular degeneration    ? Microalbuminuria    ? PN (peripheral neuropathy)    ? Pubic bone fracture (H) 2016    L   ? Stenosis of right carotid artery     Right 75%       Past Surgical History:   Procedure Laterality Date   ? APPENDECTOMY     ? CAROTID ENDARTERECTOMY Right    ? CHOLECYSTECTOMY      Early .  Biopsy of liver.   ? shock tx      50 years ago fo paranoid schizophrenia   ? TOTAL HIP ARTHROPLASTY Left      Family History   Problem Relation Age of Onset   ? Dementia Mother         In stolen car.  Left out (literally) to freeze to death at 88 years.   ? Kidney disease Father          of kidney disease.   ? Other Brother         Only recently learned that he existed.  Health is unknown.     Social History     Socioeconomic History   ? Marital status:      Spouse name: [place]   ? Number of children: Not on file   ? Years of education: Not on file   ? Highest education level: Not on file   Occupational History     Employer: RETIRED   Social Needs   ? Financial resource strain: Not on file   ? Food insecurity:     Worry: Not on file     Inability: Not on file   ? Transportation needs:     Medical: Not on file     Non-medical: Not on file   Tobacco Use   ? Smoking status: Former Smoker     Last attempt to quit: 1987     Years since  quittin.0   ? Smokeless tobacco: Former User   ? Tobacco comment: Oral tobacco only briefly.  Smoked 34 years.   Substance and Sexual Activity   ? Alcohol use: No     Comment: He has a history of issues with alcohol.   ? Drug use: No   ? Sexual activity: Not on file   Lifestyle   ? Physical activity:     Days per week: Not on file     Minutes per session: Not on file   ? Stress: Not on file   Relationships   ? Social connections:     Talks on phone: Not on file     Gets together: Not on file     Attends [employer] service: Not on file     Active member of club or organization: Not on file     Attends meetings of clubs or organizations: Not on file     Relationship status: Not on file   ? Intimate partner violence:     Fear of current or ex partner: Not on file     Emotionally abused: Not on file     Physically abused: Not on file     Forced sexual activity: Not on file   Other Topics Concern   ? Not on file   Social History Narrative    Patient of Dr. Vela since 2016.         Used to work in the [employer].        Uses a walker.  Goes to day program at [facility] for dementia.     Current Outpatient Medications   Medication Sig Dispense Refill   ? insulin glargine (LANTUS) 100 unit/mL injection Inject 10 Units under the skin every morning.     ? metoprolol succinate (TOPROL-XL) 25 MG Take 6.25 mg by mouth daily. Hold for HR <60 or SBP <140     ? cholecalciferol, vitamin D3, 1,000 unit tablet Take 2,000 Units by mouth daily.     ? insulin aspart U-100 (NOVOLOG) 100 unit/mL injection Inject under the skin 3 (three) times a day with meals. <200                0U    201-249         2U    250-299         4U    300-349         6U    350-399         8U    400-450        10U    451<             12U            ? insulin aspart U-100 (NOVOLOG) 100 unit/mL injection Inject 4 Units under the skin daily with supper. Hold for  or if not eating >50% of meal              No current  facility-administered medications for this visit.      No Known Allergies    Review of Systems:    Constitutional: Negative.  Negative for fever, chills, has activity change, appetite change and fatigue. Denies pain. BSs variable.  HENT: Negative for congestion and facial swelling.    Eyes: Negative for photophobia, redness and visual disturbance.   Respiratory: Negative for cough and chest tightness.    Cardiovascular: Negative for chest pain, palpitations and leg swelling.   Gastrointestinal: Negative for nausea, diarrhea, constipation, blood in stool and abdominal distention.   Genitourinary: Negative.    Musculoskeletal: Negative.    Skin: Negative.  Intact.   Neurological: Negative for dizziness, tremors, syncope, weakness, light-headedness and headaches.   Hematological: Does not bruise/bleed easily.   Psychiatric/Behavioral: Negative.  confused at baseline. Stable.    Vitals:    06/20/19 1641   BP: 143/69   Pulse: 67   Resp: 18   Temp: 98  F (36.7  C)   SpO2: 97%       Physical Exam:    GENERAL: no acute distress. Cooperative.  Denies pain.   HEENT: pupils are equal, round and reactive. Oral mucosa is moist and intact. Patient has some hearing loss and is legally blind.  RESP:Chest symmetric. Regular respiratory rate. No stridor.  Dim, RA.  CVS: RRR, S1S2, no murmur, rub, or gallop.  ABD: Nondistended, soft. No constipation or diarrhea.  EXTREMITIES: No lower extremity edema.  Left-sided weakness.  More bed bound and uses wc occasionally.   NEURO: no focal deficits. Alert and oriented x1. Confused with a very poor recall. Declining.   PSYCH: within normal limits. No depression or anxiety.  SKIN: warm dry intact       Labs:    No results found for this or any previous visit (from the past 240 hour(s)).  Lab Results   Component Value Date    HGBA1C 7.7 (H) 05/01/2019     Results for orders placed or performed in visit on 04/30/19   Basic Metabolic Panel   Result Value Ref Range    Sodium 140 136 - 145 mmol/L     Potassium 4.4 3.5 - 5.0 mmol/L    Chloride 105 98 - 107 mmol/L    CO2 25 22 - 31 mmol/L    Anion Gap, Calculation 10 5 - 18 mmol/L    Glucose 231 (H) 70 - 125 mg/dL    Calcium 9.0 8.5 - 10.5 mg/dL    BUN 37 (H) 8 - 28 mg/dL    Creatinine 1.80 (H) 0.70 - 1.30 mg/dL    GFR MDRD Af Amer 44 (L) >60 mL/min/1.73m2    GFR MDRD Non Af Amer 36 (L) >60 mL/min/1.73m2           Xr Chest Pa And Lateral  Result Date: 7/21/2017  XR CHEST PA AND LATERAL 7/21/2017 2:49 PM INDICATION: Aspiration COMPARISON: 7/8/2015 FINDINGS: Lung volumes are low with crowding of the pulmonary markings. No definite infiltrate or pleural effusion.    Assessment/Plan:    1.  HTN: now hypotensive, today metoprolol changed to 6.25mg daily and norvasc has been discontinued, CTM  2.  Last Cr 1.80 with GFR 36 on 4/30/19, declined in fx  3.  DM: will dc glipizide per worsening renal fx, continue SS three times a day with meals and discontinued novolog at noon and continue 4U at PM, hold for BS >140, last A1c 7.7 on 5/1.  Restart lantus 10U in am  4.  Vit D def: continue D3 2000U daily, last vit D 34.1 on 12/2017.  5.  Dementia: No behaviors, stable.  6.  Dysphagia: Continues on [facility] soft and regular consistency, no change.       Electronically signed by: Aldo Solano NP

## 2021-06-19 NOTE — PROGRESS NOTES
[facility]      Code Status:  FULL CODE  Visit Type: Review Of Multiple Medical Conditions     Facility:  [facility] [[id number]]        -2    History of Present Illness: Mo Swift is a 83 y.o. male who is currently admitted to LT on the memory care unit.  He was initially admitted to the TCU as a transfer from the hospital.  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  He was noted to have an aspiration event episode while at the  center.  He underwent a Heimlich maneuver and he was then sent to the emergency room.  His wife who also helps him as his caregiver was in the TCU herself during the same time.  His children were unable to meet his needs and they recommended placement in the TCU for him also for PT OT strengthening and rehab.  He did have a bedside swallow eval with no aspiration event noted and an x-ray chest was negative. He was subsequently discharged on his current medication regimen.      While in the TCU he did quite well with mood and behaviors remain stable though he tested extremely poorly cognitively indicating moderate to severe dementia.  His initial testing slums was 1/30 and a repeat CPT was 3.3. Currently he ambulates using a 4 wheeled walker but remains a high fall risk and 24 hour supervision. Recently per nursing he has been ambulating less though, mostly wc bound.  BS more controlled after initiating basaglar insulin, though BS were consistently 70s in am, lantus discontinued. Previously glipizide was discontinued per worsening renal fx.  Has higher BS at noon/PM, will schedule novolog at noon and PM with SS and monitor.  Intent to just use bolus coverage.  Now need to verify adequate coverage at HS.    Patient denies pain, headache, chest pain,  numbness or tingling, shortest of breath, eating or swallowing concerns, nausea or vomiting, diarrhea or bowel abnormalities, or no new integumentary concerns today.    Past Medical History:   Diagnosis Date     Alzheimer's dementia      ASCVD (arteriosclerotic cardiovascular disease)      CAD (coronary artery disease)      Chronic diarrhea      CKD (chronic kidney disease) stage 3, GFR 30-59 ml/min      CVA (cerebral vascular accident)     , POST REPAIR OF LEFT HIP FRACTURE, RESIDUAL LEFT SIDE WEAKNESS       DM type 2 (diabetes mellitus, type 2)      Falls 2016    with L side pain     Former smoker      Full code status 2017     Glaucoma      HLD (hyperlipidemia)      HTN (hypertension)      Legally blind      Macular degeneration      Microalbuminuria      PN (peripheral neuropathy)      Pubic bone fracture (H) 2016    L     Stenosis of right carotid artery     Right 75%       Past Surgical History:   Procedure Laterality Date     APPENDECTOMY       CAROTID ENDARTERECTOMY Right      CHOLECYSTECTOMY      Early .  Biopsy of liver.     shock tx      50 years ago fo paranoid schizophrenia     TOTAL HIP ARTHROPLASTY Left      Family History   Problem Relation Age of Onset     Dementia Mother      In stolen car.  Left out (literally) to freeze to death at 88 years.     Kidney disease Father       of kidney disease.     Other Brother      Only recently learned that he existed.  Health is unknown.     Social History     Social History     Marital status:      Spouse name: [place]     Number of children: N/A     Years of education: N/A     Occupational History      Retired     Social History Main Topics     Smoking status: Former Smoker     Quit date: 1987     Smokeless tobacco: Former User      Comment: Oral tobacco only briefly.  Smoked 34 years.     Alcohol use No      Comment: He has a history of issues with alcohol.     Drug use: No     Sexual activity: Not on file      Other Topics Concern     Not on file     Social History Narrative    Patient of Dr. Vela since 2016.         Used to work in the [employer].        Uses a walker.  Goes to day program at [facility] for dementia.     Current Outpatient Prescriptions   Medication Sig Dispense Refill     amLODIPine (NORVASC) 10 MG tablet Take 10 mg by mouth daily.       cholecalciferol, vitamin D3, 1,000 unit tablet Take 2,000 Units by mouth daily.       insulin aspart U-100 (NOVOLOG) 100 unit/mL injection Inject under the skin 3 (three) times a day before meals. <200                0U    201-249         2U    250-299         4U    300-349         6U    350-399         8U    400-450        10U    451<             12U       insulin aspart U-100 (NOVOLOG) 100 unit/mL injection Inject 6 Units under the skin daily with supper. Hold for  or if not eating >50% of meal        insulin aspart U-100 (NOVOLOG) 100 unit/mL injection Inject 10 Units under the skin daily with lunch.        metoprolol tartrate (LOPRESSOR) 25 MG tablet Take 25 mg by mouth 2 (two) times a day.       No current facility-administered medications for this visit.      No Known Allergies    Review of Systems:    Constitutional: Negative.  Negative for fever, chills, has activity change, appetite change and fatigue. Denies pain. Continues to have higher BSs at noon.  HENT: Negative for congestion and facial swelling.    Eyes: Negative for photophobia, redness and visual disturbance.   Respiratory: Negative for cough and chest tightness.    Cardiovascular: Negative for chest pain, palpitations and leg swelling.   Gastrointestinal: Negative for nausea, diarrhea, constipation, blood in stool and abdominal distention.   Genitourinary: Negative.    Musculoskeletal: Negative.    Skin: Negative.  Intact.   Neurological: Negative for dizziness, tremors, syncope, weakness, light-headedness and headaches.   Hematological: Does not bruise/bleed  easily.   Psychiatric/Behavioral: Negative.  confused at baseline. Stable.    Vitals:    08/18/18 1317   BP: 117/80   Pulse: 73   Resp: 18   Temp: 97  F (36.1  C)   SpO2: 96%       Physical Exam:    GENERAL: no acute distress. Cooperative.  Denies pain.   HEENT: pupils are equal, round and reactive. Oral mucosa is moist and intact.  Patient has some hearing loss and is legally blind.  RESP:Chest symmetric. Regular respiratory rate. No stridor.  CVS: S1S2, no murmur, rub, or gallop.  ABD: Nondistended, soft. No constipation or diarrhea.  EXTREMITIES: No lower extremity edema.  Left-sided weakness.  More bed bound and uses wc occasionally.   NEURO: no focal deficits. Alert and oriented x2-3. Confused with a very poor recall.  PSYCH: within normal limits. No depression or anxiety.  SKIN: warm dry intact       Labs:    No results found for this or any previous visit (from the past 240 hour(s)).  Lab Results   Component Value Date    HGBA1C 6.9 (H) 05/14/2018     Results for orders placed or performed in visit on 05/14/18   Basic Metabolic Panel   Result Value Ref Range    Sodium 138 136 - 145 mmol/L    Potassium 4.4 3.5 - 5.0 mmol/L    Chloride 105 98 - 107 mmol/L    CO2 25 22 - 31 mmol/L    Anion Gap, Calculation 8 5 - 18 mmol/L    Glucose 143 (H) 70 - 125 mg/dL    Calcium 8.6 8.5 - 10.5 mg/dL    BUN 37 (H) 8 - 28 mg/dL    Creatinine 1.81 (H) 0.70 - 1.30 mg/dL    GFR MDRD Af Amer 44 (L) >60 mL/min/1.73m2    GFR MDRD Non Af Amer 36 (L) >60 mL/min/1.73m2           Xr Chest Pa And Lateral  Result Date: 7/21/2017  XR CHEST PA AND LATERAL 7/21/2017 2:49 PM INDICATION: Aspiration COMPARISON: 7/8/2015 FINDINGS: Lung volumes are low with crowding of the pulmonary markings. No definite infiltrate or pleural effusion.    Assessment/Plan:    1.  HTN: Borderline, stable on metoprolol 25mg BID and norvasc 10mg daily, allowed some permissive hypertension.  2.  Last Cr 1.81 with GFR 36, declined in fx  3.  DM: today dc glipizide per  worsening renal fx, previously dc'd lantus per hypoglycemia, continue SS three times a day with meals and increase novolog to 10U at noon and maintain 6U at PM, hold for BS >140, last A1c of 6.9 (improved).  Will monitor HS BS too.  4.  Vit D def: continue D3 2000U daily, last vit D 34.1 on 12/2017.  5.  Dementia: No behaviors, stable.  6.  Dysphagia: Continues on mech soft and regular consistency, no change.      The care plan has been reviewed and all orders signed. Changes to care plan, if any, as noted. Otherwise, continue care plan of care.     Electronically signed by: Aldo Solano NP

## 2021-06-19 NOTE — LETTER
Letter by Adelaida Woods MBBS at      Author: Adelaida Woods MBBS Service: -- Author Type: --    Filed:  Encounter Date: 12/2/2019 Status: Signed         Patient: Mo Swift   MR Number: [id number]   YOB: 1934   Date of Visit: 12/2/2019       [facility] Admission note      Patient: Mo Swift  MRN: [id number]  Date of Service: 12/2/2019      [facility] [[id number]]  Reason for Visit     Chief Complaint   Patient presents with   ? H & P       Code Status     DNR    Assessment     -Advanced Alzheimer's dementia end-stage  -Chronic anemia severe with work-up being negative for any GI losses  -Hypertension with hypotension on a low-dose of metoprolol with hold parameters  -Diabetes type 2 on Lantus on a lower dose of 10 units due to hypoglycemia noted with blood sugars in the 60s.  -Legally blind  - RICHARD/ CKD-3 with elevated creatinines noted on admission secondary to poor oral intake  -Dehydration secondary to declining oral intake  -History of CAD  -History of CVA  -Generalized debilitation    Plan     Patient has been readmitted to long-term care on hospice cares.  Family is comfortable with the decision understanding his progressive cognitive and physical decline.  He was examined the presence of his daughter who also requested to meet with me and care plan was reviewed with her in detail.  Family is actually requested hospitalization.  They have some issues as per the daughter with care given and are quite upset with the staff.  They wanted to be reassured that there was nothing reversible in their father's dementia.  At baseline he is nonverbal does not answer questions appropriately and has incontinence.  He was also noted to have a sacral pressure stage II.  He is nonambulatory and using a wheelchair family's aware of these decline.  As per his daughter present at bedside she plans to visit him daily in the afternoon to continue with intellectual  stimulation for her father because she feels that is what is leading to his decline.  Please note that his wife  2 months ago and family attributes his rapid decline to this.  He continues on metoprolol as well as insulin for now.  Blood sugars are stable in the morning postprandials are generally in the 300-400 range we will monitor trends before making any adjustment in the patient with advanced dementia and hospice cares  Pain management reviewed both with him as well as daughter present at bedside they do not think he has any pain issues and are comfortable with no medications for that.  As per his daughter his oral intake has been quite good staff is been trying to push some fluids also.  Hospice care management was reviewed she initially had requested full code but now has elected a DNR CODE STATUS for her father.  She is aware of his sacral pressure sore and will monitor this closely as per her family remains actively involved in his care currently and also plans to have their previous primary care physician reviewing his medications and charts quite closely.  They had several concerns about staff and those were reviewed with him.  Overall however they are happy with their father's care and I have encouraged him to discuss this with me as well as nursing manager if this is ongoing concern  Daughter did notify me that family had not been visiting dad as often as they would like because of some ongoing concerns and conflicts going on with there have brothers but now that their mother has passed and they suspect the brothers will not be visiting their father anymore the daughter is planning to be present almost daily.  She will not attend even a care conference on the phone if her brothers are going to be present as per her  Total time spent is 45 minutes more than 35 minutes spent face-to-face talking to the patient in the presence of his daughter  Care plan including comfort measures on hospice cares  diabetes management and anemia management were all reviewed with family including goals of care and certain care issues to have with the staff as outlined in my note above.  Family is aware of his cognitive and physical decline and plan to be more involved in his care hospice would also be an additional support.  They know that Mr. Swift remains almost nonverbal due to advanced dementia and has significant confusion    History     Patient is a very pleasant 85 y.o. male who is admitted to LTC  Patient has been admitted with advanced end-stage Alzheimer's dementia.  He has been in the nursing home memory care unit due to dementia and this has been progressive with decreasing oral intake and patient becoming progressively less interactive and verbal.  Recently 2 months ago since his wife  he has had more progressive decline.  His daughter present at his bedside provided history and stated that that suspect that this could be due to lack of intellectual stimulation and family plans to visit him daily in the hope that this will keep him stimulated.  He also has a history of severe anemia recheck hemoglobin in the nursing home was 6.9 recheck in the hospital were stable at around 7 this was felt to be chronic anemia  Stool work-up was negative for any GI bleeding and they were guaiac negative.  This was felt to be chronic anemia and work-up has been deferred.  There is also history of diabetes he admitted with a blood sugar of 400 however blood sugars were low in the 60s in the hospital and he is on a low-dose of Lantus at 10 units.  He also has a history of hypertension metoprolol dosage was reduced due to low blood pressures.  Family is quite involved in his care and have elected to put him on hospice cares due to progressive decline both physically and cognitively  He also had acute renal insufficiency with elevated creatinine on admission which was felt to be secondary to dehydration this was corrected with IV  fluid hydration    Past Medical History     Active Ambulatory (Non-Hospital) Problems    Diagnosis   ? Anemia   ? Acute kidney injury (H)   ? Acute cystitis without hematuria   ? Dysphagia   ? Vitamin D deficiency   ? Aspiration into airway   ? Aspiration of food   ? Full code status   ? Microalbuminuria   ? Alzheimer's dementia (H)   ? ASCVD (arteriosclerotic cardiovascular disease)   ? Legally blind   ? Former smoker   ? Stenosis of right carotid artery   ? HLD (hyperlipidemia)   ? Alzheimer Disease   ? HTN (hypertension)   ? Stroke - multiple lacunar infarcts (CT of 4/12/16) with LLE residual weakness per daughter.  4/29/16   ? CKD (chronic kidney disease) stage 3, GFR 30-59 ml/min (H)   ? PN (peripheral neuropathy)   ? DM type 2 (diabetes mellitus, type 2) (H)     Past Medical History:   Diagnosis Date   ? Alzheimer's dementia (H)    ? ASCVD (arteriosclerotic cardiovascular disease)    ? CAD (coronary artery disease)    ? Chronic diarrhea    ? CKD (chronic kidney disease) stage 3, GFR 30-59 ml/min    ? CVA (cerebral vascular accident)    ? DM type 2 (diabetes mellitus, type 2)    ? Falls 06/02/2016   ? Former smoker    ? Full code status 1/22/2017   ? Glaucoma    ? HLD (hyperlipidemia)    ? HTN (hypertension)    ? Legally blind    ? Macular degeneration    ? Microalbuminuria    ? PN (peripheral neuropathy)    ? Pubic bone fracture (H) 06/02/2016   ? Stenosis of right carotid artery        Past Social History     Reviewed, and he  reports that he quit smoking about 32 years ago. He has quit using smokeless tobacco. He reports that he does not drink alcohol or use drugs.    Family History     Reviewed, and family history includes Dementia in his mother; Kidney disease in his father; Other in his brother.    Medication List   Post Discharge Medication Reconciliation Status: discharge medications reconciled, continue medications without change   Current Outpatient Medications on File Prior to Visit   Medication Sig  Dispense Refill   ? acetaminophen (TYLENOL) 325 MG tablet Take 2 tablets (650 mg total) by mouth every 4 (four) hours as needed.  0   ? cholecalciferol, vitamin D3, 1,000 unit tablet Take 2,000 Units by mouth daily.     ? insulin aspart U-100 (NOVOLOG) 100 unit/mL injection Inject under the skin 3 (three) times a day with meals. <200                0U    201-249         2U    250-299         4U    300-349         6U    350-399         8U    400-450        10U    451<             12U            ? insulin aspart U-100 (NOVOLOG) 100 unit/mL injection Inject 4 Units under the skin daily with supper. Hold for  or if not eating >50% of meal            ? insulin glargine (LANTUS) 100 unit/mL injection Inject 10 Units under the skin every morning.      ? metoprolol succinate (TOPROL-XL) 25 MG Take 6.25 mg by mouth daily. Hold for HR <60 or SBP <140     ? omeprazole (PRILOSEC) 20 MG capsule Take 1 capsule (20 mg total) by mouth daily before breakfast.  0     No current facility-administered medications on file prior to visit.        Allergies     No Known Allergies    Review of Systems   A comprehensive review of 14 systems was done. Pertinent findings noted here and in history of present illness. All the rest negative.  Constitutional: Negative.  Negative for fever, chills,has  activity change, appetite change and fatigue.   HENT: Negative for congestion and facial swelling.    Eyes: Negative for photophobia, redness and visual disturbance.   Respiratory: Negative for cough and chest tightness.    Cardiovascular: Negative for chest pain, palpitations and leg swelling.   Gastrointestinal: Negative for nausea, diarrhea, constipation, blood in stool and abdominal distention.   Genitourinary: Negative.    Musculoskeletal: Negative.  WC bound  Skin: Negative.    Neurological: Negative for dizziness, tremors, syncope, weakness, light-headedness and headaches.   Hematological: Does not bruise/bleed easily.    Psychiatric/Behavioral: Negative.  Confused; speech is nonsensical      Physical Exam     Recent Vitals 12/1/2019   Weight 132 lbs   BSA (m2) 1.63 m2   BP 94/50   Pulse 92   Temp -   Temp src -   SpO2 97   Some recent data might be hidden       Constitutional: Oriented to person,  and appears well-developed.   HEENT:  Normocephalic and atraumatic.  Eyes: Conjunctivae and EOM are normal. Pupils are equal, round, and reactive to light. No discharge.  No scleral icterus. Nose normal. Mouth/Throat: Oropharynx is clear and moist. No oropharyngeal exudate.    Pt is legally blind  NECK: Normal range of motion. Neck supple. No JVD present. No tracheal deviation present. No thyromegaly present.   CARDIOVASCULAR: Normal rate, regular rhythm and intact distal pulses.  Exam reveals no gallop and no friction rub.  Systolic murmur present.  PULMONARY: Effort normal and breath sounds normal. No respiratory distress.No Wheezing or rales.  ABDOMEN: Soft. Bowel sounds are normal. No distension and no mass.  There is no tenderness. There is no rebound and no guarding. No HSM.  MUSCULOSKELETAL: Normal range of motion. No edema and no tenderness. Mild kyphosis, no tenderness.  LYMPH NODES: Has no cervical, supraclavicular, axillary and groin adenopathy.   NEUROLOGICAL: Alert and oriented to person, . No cranial nerve deficit.  Normal muscle tone. Coordination normal.   GENITOURINARY: Deferred exam.  SKIN: Skin is warm and dry. No rash noted. No erythema.  pallor noted on exam.   EXTREMITIES: No cyanosis, no clubbing, no edema. No Deformity.  PSYCHIATRIC: Normal mood, affect and behavior.  Recall is completely impaired and answers are nonsensical      Lab Results     Recent Results (from the past 240 hour(s))   [facility](CBC w/o Differential)    Collection Time: 11/26/19  5:51 PM   Result Value Ref Range    WBC 6.3 4.0 - 11.0 thou/uL    RBC 2.22 (L) 4.40 - 6.20 mill/uL    Hemoglobin 7.2 (L) 14.0 - 18.0 g/dL    Hematocrit 23.5 (L) 40.0 - 54.0  %     (H) 80 - 100 fL    MCH 32.4 27.0 - 34.0 pg    MCHC 30.6 (L) 32.0 - 36.0 g/dL    RDW 17.2 (H) 11.0 - 14.5 %    Platelets 368 140 - 440 thou/uL    MPV 9.1 8.5 - 12.5 fL   Basic Metabolic Panel    Collection Time: 11/26/19  5:51 PM   Result Value Ref Range    Sodium 137 136 - 145 mmol/L    Potassium 4.7 3.5 - 5.0 mmol/L    Chloride 104 98 - 107 mmol/L    CO2 22 22 - 31 mmol/L    Anion Gap, Calculation 11 5 - 18 mmol/L    Glucose 262 (H) 70 - 125 mg/dL    Calcium 8.7 8.5 - 10.5 mg/dL    BUN 50 (H) 8 - 28 mg/dL    Creatinine 2.32 (H) 0.70 - 1.30 mg/dL    GFR MDRD Af Amer 33 (L) >60 mL/min/1.73m2    GFR MDRD Non Af Amer 27 (L) >60 mL/min/1.73m2   Hepatic Profile    Collection Time: 11/26/19  5:51 PM   Result Value Ref Range    Bilirubin, Total 0.2 0.0 - 1.0 mg/dL    Bilirubin, Direct <0.1 <=0.5 mg/dL    Protein, Total 6.6 6.0 - 8.0 g/dL    Albumin 2.6 (L) 3.5 - 5.0 g/dL    Alkaline Phosphatase 104 45 - 120 U/L    AST 9 0 - 40 U/L    ALT <9 0 - 45 U/L   APTT    Collection Time: 11/26/19  5:51 PM   Result Value Ref Range    PTT 33 24 - 37 seconds   INR    Collection Time: 11/26/19  5:51 PM   Result Value Ref Range    INR 1.06 0.90 - 1.10   ECG 12 lead nursing unit performed    Collection Time: 11/26/19  5:51 PM   Result Value Ref Range    SYSTOLIC BLOOD PRESSURE      DIASTOLIC BLOOD PRESSURE      VENTRICULAR RATE 78 BPM    ATRIAL RATE 78 BPM    P-R INTERVAL 208 ms    QRS DURATION 80 ms    Q-T INTERVAL 360 ms    QTC CALCULATION (BEZET) 410 ms    P Axis 67 degrees    R AXIS 1 degrees    T AXIS 53 degrees    MUSE DIAGNOSIS       Normal sinus rhythm  Possible Inferior infarct , age undetermined  Abnormal ECG  When compared with ECG of 02-JUN-2016 21:47,  RI interval has decreased  Possible Inferior infarct is now Present  Confirmed by APOLONIA BAZAN MD LOC: ([id number]) on 11/28/2019 9:56:19 AM     Type and Screen    Collection Time: 11/26/19  6:01 PM   Result Value Ref Range    ABORh A POS     Antibody Screen  Negative Negative   Urinalysis-UC if Indicated    Collection Time: 11/26/19  6:26 PM   Result Value Ref Range    Color, UA Yellow Colorless, Yellow, Straw, Light Yellow    Clarity, UA Cloudy (!) Clear    Glucose,  mg/dL (!) Negative    Bilirubin, UA Negative Negative    Ketones, UA Negative Negative    Specific Gravity, UA 1.010 1.001 - 1.030    Blood, UA Small (!) Negative    pH, UA 6.0 4.5 - 8.0    Protein,  mg/dL (!) Negative mg/dL    Urobilinogen, UA <2.0 E.U./dL <2.0 E.U./dL, 2.0 E.U./dL    Nitrite, UA Negative Negative    Leukocytes, UA Large (!) Negative    Bacteria, UA Many (!) None Seen hpf    RBC, UA 5-10 (!) None Seen, 0-2 hpf    WBC, UA >100 (!) None Seen, 0-5 hpf    Squam Epithel, UA 25-50 (!) None Seen, 0-5 lpf    WBC Clumps Present (!) None Seen    Mucus, UA Few (!) None Seen lpf   Culture, Urine    Collection Time: 11/26/19  6:26 PM   Result Value Ref Range    Culture >100,000 col/ml ESBL producing Escherichia coli (!)        Susceptibility    ESBL producing Escherichia coli - GEORGE     Amoxicillin + Clavulanate 8/4 Sensitive      Ampicillin >16 Resistant      Cefazolin >16 Resistant      Cefepime 16 Resistant      Ceftriaxone >32 Resistant      Ciprofloxacin >2 Resistant      Gentamicin <=2 Sensitive      Levofloxacin >4 Resistant      Meropenem <=0.25 Sensitive      Nitrofurantoin 32 Sensitive      Tetracycline >8 Resistant      Tobramycin <=2 Sensitive      Trimethoprim + Sulfamethoxazole >2/38 Resistant    POCT occult blood stool    Collection Time: 11/26/19  6:27 PM   Result Value Ref Range    POC Fecal Occult Bld Negative Negative    Lot Number [id number]     Expiration Date 12/21     Diluent/Developer Lot Number [id number]     Diluent/Developer Expiration Date 2021/7     Pos Control Valid Control Valid Control    Neg Control Valid Control Valid Control   POCT Glucose    Collection Time: 11/26/19  6:31 PM   Result Value Ref Range    Glucose 215 (H) 70 - 139 mg/dL   POCT Glucose    Collection  Time: 11/26/19  8:17 PM   Result Value Ref Range    Glucose 63 (L) 70 - 139 mg/dL   POCT Glucose    Collection Time: 11/26/19  8:44 PM   Result Value Ref Range    Glucose 60 (L) 70 - 139 mg/dL   POCT Glucose    Collection Time: 11/26/19  9:05 PM   Result Value Ref Range    Glucose 86 70 - 139 mg/dL   POCT Glucose    Collection Time: 11/26/19 10:27 PM   Result Value Ref Range    Glucose 129 70 - 139 mg/dL   POCT Glucose    Collection Time: 11/27/19  3:35 AM   Result Value Ref Range    Glucose 154 (H) 70 - 139 mg/dL   POCT Glucose    Collection Time: 11/27/19  6:17 AM   Result Value Ref Range    Glucose 158 (H) 70 - 139 mg/dL   Basic metabolic panel    Collection Time: 11/27/19  6:21 AM   Result Value Ref Range    Sodium 138 136 - 145 mmol/L    Potassium 4.6 3.5 - 5.0 mmol/L    Chloride 107 98 - 107 mmol/L    CO2 22 22 - 31 mmol/L    Anion Gap, Calculation 9 5 - 18 mmol/L    Glucose 155 (H) 70 - 125 mg/dL    Calcium 8.5 8.5 - 10.5 mg/dL    BUN 39 (H) 8 - 28 mg/dL    Creatinine 1.87 (H) 0.70 - 1.30 mg/dL    GFR MDRD Af Amer 42 (L) >60 mL/min/1.73m2    GFR MDRD Non Af Amer 34 (L) >60 mL/min/1.73m2   Hemoglobin    Collection Time: 11/27/19  6:21 AM   Result Value Ref Range    Hemoglobin 7.1 (L) 14.0 - 18.0 g/dL   Vitamin B12    Collection Time: 11/27/19  6:21 AM   Result Value Ref Range    Vitamin B-12 548 213 - 816 pg/mL   Reticulocytes    Collection Time: 11/27/19  6:21 AM   Result Value Ref Range    Retic Absolute Count 0.069 0.010 - 0.110 mill/uL    Retic Ct Pct 3.10 (H) 0.8 - 2.7 %   POCT Glucose    Collection Time: 11/27/19 11:22 AM   Result Value Ref Range    Glucose 185 (H) 70 - 139 mg/dL   Folate, Serum    Collection Time: 11/27/19  1:13 PM   Result Value Ref Range    Folate 7.9 >=3.5 ng/mL   POCT Glucose    Collection Time: 11/27/19  5:46 PM   Result Value Ref Range    Glucose 192 (H) 70 - 139 mg/dL   POCT Glucose    Collection Time: 11/27/19  9:06 PM   Result Value Ref Range    Glucose 282 (H) 70 - 139 mg/dL    POCT Glucose    Collection Time: 11/28/19  6:11 AM   Result Value Ref Range    Glucose 107 70 - 139 mg/dL   Iron    Collection Time: 11/28/19  6:57 AM   Result Value Ref Range    Iron 39 (L) 42 - 175 ug/dL   Basic Metabolic Panel    Collection Time: 11/28/19  6:57 AM   Result Value Ref Range    Sodium 140 136 - 145 mmol/L    Potassium 4.5 3.5 - 5.0 mmol/L    Chloride 109 (H) 98 - 107 mmol/L    CO2 21 (L) 22 - 31 mmol/L    Anion Gap, Calculation 10 5 - 18 mmol/L    Glucose 102 70 - 125 mg/dL    Calcium 8.4 (L) 8.5 - 10.5 mg/dL    BUN 34 (H) 8 - 28 mg/dL    Creatinine 1.59 (H) 0.70 - 1.30 mg/dL    GFR MDRD Af Amer 50 (L) >60 mL/min/1.73m2    GFR MDRD Non Af Amer 42 (L) >60 mL/min/1.73m2   Hemoglobin    Collection Time: 11/28/19  6:57 AM   Result Value Ref Range    Hemoglobin 7.4 (L) 14.0 - 18.0 g/dL   POCT Glucose    Collection Time: 11/28/19 11:20 AM   Result Value Ref Range    Glucose 163 (H) 70 - 139 mg/dL   POCT Glucose    Collection Time: 11/28/19  4:41 PM   Result Value Ref Range    Glucose 169 (H) 70 - 139 mg/dL   POCT Glucose    Collection Time: 11/28/19  9:39 PM   Result Value Ref Range    Glucose 261 (H) 70 - 139 mg/dL   POCT Glucose    Collection Time: 11/29/19  6:59 AM   Result Value Ref Range    Glucose 81 70 - 139 mg/dL   POCT Glucose    Collection Time: 11/29/19 12:00 PM   Result Value Ref Range    Glucose 105 70 - 139 mg/dL            Imaging Results     No results found.          ADELINE Baptsite

## 2021-06-19 NOTE — LETTER
Letter by Aldo Solano NP at      Author: Aldo Solano NP Service: -- Author Type: --    Filed:  Encounter Date: 10/14/2019 Status: Signed         Patient: Mo Swift   MR Number: [id number]   YOB: 1934   Date of Visit: 10/14/2019     [facility]      Code Status:  FULL CODE  Visit Type: Review Of Multiple Medical Conditions     Facility:  [facility] [[id number]]         301    History of Present Illness: Mo Swift is a 84 y.o. male who is currently admitted to LTC on the memory care unit.  He was initially admitted to the TCU as a transfer from the hospital.  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  He was noted to have an aspiration event episode while at the  center.  He underwent a Heimlich maneuver and he was then sent to the emergency room.  His wife who also helps him as his caregiver was in the TCU herself during the same time.  His children were unable to meet his needs and they recommended placement in the TCU for him also for PT OT strengthening and rehab.  He did have a bedside swallow eval with no aspiration event noted and an x-ray chest was negative. He was subsequently discharged on his current medication regimen.      LTC:  He currently resides in LTC.  He mostly wc bound now and declining physically/mentally.  BSs have been increasing especially afternoon. Previously glipizide was discontinued per worsening renal fx, was restarted, though  discontinued again per renal fx. Also previously titrated metoprolol per hypotension. Today no change.      Past Medical History:   Diagnosis Date   ? Alzheimer's dementia    ? ASCVD (arteriosclerotic cardiovascular disease)    ? CAD (coronary artery disease)    ? Chronic diarrhea    ? CKD  (chronic kidney disease) stage 3, GFR 30-59 ml/min    ? CVA (cerebral vascular accident)     , POST REPAIR OF LEFT HIP FRACTURE, RESIDUAL LEFT SIDE WEAKNESS     ? DM type 2 (diabetes mellitus, type 2)    ? Falls 2016    with L side pain   ? Former smoker    ? Full code status 2017   ? Glaucoma    ? HLD (hyperlipidemia)    ? HTN (hypertension)    ? Legally blind    ? Macular degeneration    ? Microalbuminuria    ? PN (peripheral neuropathy)    ? Pubic bone fracture (H) 2016    L   ? Stenosis of right carotid artery     Right 75%       Past Surgical History:   Procedure Laterality Date   ? APPENDECTOMY     ? CAROTID ENDARTERECTOMY Right    ? CHOLECYSTECTOMY      Early s.  Biopsy of liver.   ? shock tx      50 years ago fo paranoid schizophrenia   ? TOTAL HIP ARTHROPLASTY Left      Family History   Problem Relation Age of Onset   ? Dementia Mother         In stolen car.  Left out (literally) to freeze to death at 88 years.   ? Kidney disease Father          of kidney disease.   ? Other Brother         Only recently learned that he existed.  Health is unknown.     Social History     Socioeconomic History   ? Marital status:      Spouse name: [place]   ? Number of children: Not on file   ? Years of education: Not on file   ? Highest education level: Not on file   Occupational History     Employer: RETIRED   Social Needs   ? Financial resource strain: Not on file   ? Food insecurity:     Worry: Not on file     Inability: Not on file   ? Transportation needs:     Medical: Not on file     Non-medical: Not on file   Tobacco Use   ? Smoking status: Former Smoker     Last attempt to quit: 1987     Years since quittin.3   ? Smokeless tobacco: Former User   ? Tobacco comment: Oral tobacco only briefly.  Smoked 34 years.   Substance and Sexual Activity   ? Alcohol use: No     Comment: He has a history of issues with alcohol.   ? Drug use: No   ? Sexual activity: Not on file    Lifestyle   ? Physical activity:     Days per week: Not on file     Minutes per session: Not on file   ? Stress: Not on file   Relationships   ? Social connections:     Talks on phone: Not on file     Gets together: Not on file     Attends Gnosticist service: Not on file     Active member of club or organization: Not on file     Attends meetings of clubs or organizations: Not on file     Relationship status: Not on file   ? Intimate partner violence:     Fear of current or ex partner: Not on file     Emotionally abused: Not on file     Physically abused: Not on file     Forced sexual activity: Not on file   Other Topics Concern   ? Not on file   Social History Narrative    Patient of Dr. Vela since 2016.         Used to work in the [employer].        Uses a walker.  Goes to day program at [facility] for dementia.     Current Outpatient Medications   Medication Sig Dispense Refill   ? cholecalciferol, vitamin D3, 1,000 unit tablet Take 2,000 Units by mouth daily.     ? insulin aspart U-100 (NOVOLOG) 100 unit/mL injection Inject under the skin 3 (three) times a day with meals. <200                0U    201-249         2U    250-299         4U    300-349         6U    350-399         8U    400-450        10U    451<             12U            ? insulin aspart U-100 (NOVOLOG) 100 unit/mL injection Inject 4 Units under the skin daily with supper. Hold for  or if not eating >50% of meal            ? insulin glargine (LANTUS) 100 unit/mL injection Inject 15 Units under the skin every morning.            ? metoprolol succinate (TOPROL-XL) 25 MG Take 6.25 mg by mouth daily. Hold for HR <60 or SBP <140       No current facility-administered medications for this visit.      No Known Allergies    Review of Systems:    Constitutional: Negative.  Negative for fever, chills, has activity change, appetite change and fatigue. Denies pain. BSs variable.  HENT: Negative for congestion and facial  swelling.    Eyes: Negative for photophobia, redness and visual disturbance.   Respiratory: Negative for cough and chest tightness.    Cardiovascular: Negative for chest pain, palpitations and leg swelling.   Gastrointestinal: Negative for nausea, diarrhea, constipation, blood in stool and abdominal distention.   Genitourinary: Negative.    Musculoskeletal: Negative.    Skin: Chronic coccyx PU   Neurological: Negative for dizziness, tremors, syncope, weakness, light-headedness and headaches.   Hematological: Does not bruise/bleed easily.   Psychiatric/Behavioral: Negative.  confused at baseline. Stable.    Vitals:    10/14/19 1225   BP: 134/68   Pulse: 69   Resp: 20   Temp: 97  F (36.1  C)       Physical Exam:    GENERAL: no acute distress. Cooperative.  Denies pain.   HEENT: pupils are equal, round and reactive. Oral mucosa is moist and intact. Patient has some hearing loss and is legally blind.  RESP:Chest symmetric. Regular respiratory rate. No stridor.  Dim, RA.  CVS: RRR, S1S2, no murmur, rub, or gallop.  ABD: Nondistended, soft. No constipation or diarrhea.  EXTREMITIES: No lower extremity edema.  Left-sided weakness.  More bed bound and uses wc occasionally.   NEURO: no focal deficits. Alert and oriented x1. Confused with a very poor recall. Declining.   PSYCH: within normal limits. No depression or anxiety.  SKIN: chronic coccyx PU      Labs:    No results found for this or any previous visit (from the past 240 hour(s)).  Lab Results   Component Value Date    HGBA1C 7.7 (H) 05/01/2019     Results for orders placed or performed in visit on 04/30/19   Basic Metabolic Panel   Result Value Ref Range    Sodium 140 136 - 145 mmol/L    Potassium 4.4 3.5 - 5.0 mmol/L    Chloride 105 98 - 107 mmol/L    CO2 25 22 - 31 mmol/L    Anion Gap, Calculation 10 5 - 18 mmol/L    Glucose 231 (H) 70 - 125 mg/dL    Calcium 9.0 8.5 - 10.5 mg/dL    BUN 37 (H) 8 - 28 mg/dL    Creatinine 1.80 (H) 0.70 - 1.30 mg/dL    GFR MDRD Af Amer  44 (L) >60 mL/min/1.73m2    GFR MDRD Non Af Amer 36 (L) >60 mL/min/1.73m2           Xr Chest Pa And Lateral  Result Date: 7/21/2017  XR CHEST PA AND LATERAL 7/21/2017 2:49 PM INDICATION: Aspiration COMPARISON: 7/8/2015 FINDINGS: Lung volumes are low with crowding of the pulmonary markings. No definite infiltrate or pleural effusion.    Assessment/Plan:    1.  HTN: now hypotensive, continue metoprolol 6.25mg daily, norvasc has been discontinued, CTM  2.  Last Cr 1.80 with GFR 36 on 4/30/19, declined in fx  3.  DM: will dc glipizide per worsening renal fx, continue SS three times a day with meals and discontinued novolog at noon and continue 4U at PM, hold for BS >140, last A1c 7.7 on 5/2019. Plan to increase lantus to 15U in am if recheck A1c 8.5<  4.  Vit D def: continue D3 2000U daily, last vit D 34.1 on 12/2017.  5.  Dementia: No behaviors, stable.  6.  [facility] and regular consistency, no change.       Electronically signed by: Aldo Solano NP

## 2021-06-19 NOTE — LETTER
Letter by Adelaida Woods MBBS at      Author: Adelaida Woods MBBS Service: -- Author Type: --    Filed:  Encounter Date: 7/8/2019 Status: (Other)         Patient: Mo Swift   MR Number: [id number]   YOB: 1934   Date of Visit: 7/8/2019     [facility]      Code Status:  FULL CODE  Visit Type: Review Of Multiple Medical Conditions     Facility:  [facility] [[id number]]           History of Present Illness: Mo Swift is a 84 y.o. male who is currently a resident of [facility].    He has dementia and is in a memory care unit   he tested extremely poorly cognitively indicating moderate to severe dementia.  His initial testing slums was 1/30 and a repeat CPT was 3.3/ 5.6.  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   Patient has diabetes.  Due to oral intake increasing he remains in medications recently due to kidney issues switched to Lantus 10 units for management of his blood sugars.  Also has had low blood pressures due to which his Norvasc has been discontinued and he remains on a low-dose of metoprolol.  Weights are being monitored and so far are stable no recent behavioral issues either    Past Medical History:   Diagnosis Date   ? Alzheimer's dementia    ? ASCVD (arteriosclerotic cardiovascular disease)    ? CAD (coronary artery disease)    ? Chronic diarrhea    ? CKD (chronic kidney disease) stage 3, GFR 30-59 ml/min    ? CVA (cerebral vascular accident)     2010, POST REPAIR OF LEFT HIP FRACTURE, RESIDUAL LEFT SIDE WEAKNESS     ? DM type 2 (diabetes mellitus, type 2)    ? Falls 06/02/2016    with L side pain   ? Former smoker    ? Full code status 1/22/2017   ? Glaucoma    ? HLD (hyperlipidemia)    ? HTN (hypertension)    ? Legally blind    ? Macular degeneration    ? Microalbuminuria    ? PN (peripheral neuropathy)    ? Pubic bone fracture (H) 06/02/2016    L   ? Stenosis of right carotid artery     Right 75%       Past Surgical  History:   Procedure Laterality Date   ? APPENDECTOMY     ? CAROTID ENDARTERECTOMY Right    ? CHOLECYSTECTOMY      Early 30's.  Biopsy of liver.   ? shock tx      50 years ago fo paranoid schizophrenia   ? TOTAL HIP ARTHROPLASTY Left      Family History   Problem Relation Age of Onset   ? Dementia Mother         In stolen car.  Left out (literally) to freeze to death at 88 years.   ? Kidney disease Father          of kidney disease.   ? Other Brother         Only recently learned that he existed.  Health is unknown.     Social History     Socioeconomic History   ? Marital status:      Spouse name: [place]   ? Number of children: Not on file   ? Years of education: Not on file   ? Highest education level: Not on file   Occupational History     Employer: RETIRED   Social Needs   ? Financial resource strain: Not on file   ? Food insecurity:     Worry: Not on file     Inability: Not on file   ? Transportation needs:     Medical: Not on file     Non-medical: Not on file   Tobacco Use   ? Smoking status: Former Smoker     Last attempt to quit: 1987     Years since quittin.1   ? Smokeless tobacco: Former User   ? Tobacco comment: Oral tobacco only briefly.  Smoked 34 years.   Substance and Sexual Activity   ? Alcohol use: No     Comment: He has a history of issues with alcohol.   ? Drug use: No   ? Sexual activity: Not on file   Lifestyle   ? Physical activity:     Days per week: Not on file     Minutes per session: Not on file   ? Stress: Not on file   Relationships   ? Social connections:     Talks on phone: Not on file     Gets together: Not on file     Attends Lutheran service: Not on file     Active member of club or organization: Not on file     Attends meetings of clubs or organizations: Not on file     Relationship status: Not on file   ? Intimate partner violence:     Fear of current or ex partner: Not on file     Emotionally abused: Not on file     Physically abused: Not on file      Forced sexual activity: Not on file   Other Topics Concern   ? Not on file   Social History Narrative    Patient of Dr. Vela since 2016.         Used to work in the [email or web address].        Uses a walker.  Goes to day program at [facility] for dementia.     Current Outpatient Medications   Medication Sig Dispense Refill   ? cholecalciferol, vitamin D3, 1,000 unit tablet Take 2,000 Units by mouth daily.     ? insulin aspart U-100 (NOVOLOG) 100 unit/mL injection Inject under the skin 3 (three) times a day with meals. <200                0U    201-249         2U    250-299         4U    300-349         6U    350-399         8U    400-450        10U    451<             12U            ? insulin aspart U-100 (NOVOLOG) 100 unit/mL injection Inject 4 Units under the skin daily with supper. Hold for  or if not eating >50% of meal            ? insulin glargine (LANTUS) 100 unit/mL injection Inject 10 Units under the skin every morning.     ? metoprolol succinate (TOPROL-XL) 25 MG Take 6.25 mg by mouth daily. Hold for HR <60 or SBP <140       No current facility-administered medications for this visit.      No Known Allergies      Review of Systems:    Constitutional: Negative.  Negative for fever, chills, has activity change, appetite change and fatigue.   HENT: Negative for congestion and facial swelling.    Eyes: Negative for photophobia, redness and visual disturbance.   Respiratory: Negative for cough and chest tightness.    Cardiovascular: Negative for chest pain, palpitations and leg swelling.   Gastrointestinal: Negative for nausea, diarrhea, constipation, blood in stool and abdominal distention.   Genitourinary: Negative.    Musculoskeletal: Negative.    Has a fall recently reported by nursing also had ecchymosis of his wrist  Skin: Negative.    Neurological: Negative for dizziness, tremors, syncope, weakness, light-headedness and headaches.   Hematological: Does not bruise/bleed easily.    Psychiatric/Behavioral: Negative.  confused    Physical Exam:    Pressure  127 /65 temp 98 pulse 72 weight is 145 pound  GENERAL: no acute distress. NON VERBAL; NON cooperative in conversation.   HEENT: pupils are equal, round and reactive. Oral mucosa is moist and intact.  Patient has some hearing loss and is legally blind  RESP:Chest symmetric. Regular respiratory rate. No stridor.  CVS: S1S2  ABD: Nondistended, soft.  EXTREMITIES: No lower extremity edema.  Left-sided weakness with some spasticity of his hand noted uses a walker for ambulation  NEURO: non focal. Alert and oriented xself. Confused with a very poor recall does not follow any commands any longer  PSYCH: within normal limits. No depression or anxiety.  SKIN: warm dry intact     Labs:      Lab Results   Component Value Date    HGBA1C 7.7 (H) 05/01/2019     Results for orders placed or performed in visit on 04/30/19   Basic Metabolic Panel   Result Value Ref Range    Sodium 140 136 - 145 mmol/L    Potassium 4.4 3.5 - 5.0 mmol/L    Chloride 105 98 - 107 mmol/L    CO2 25 22 - 31 mmol/L    Anion Gap, Calculation 10 5 - 18 mmol/L    Glucose 231 (H) 70 - 125 mg/dL    Calcium 9.0 8.5 - 10.5 mg/dL    BUN 37 (H) 8 - 28 mg/dL    Creatinine 1.80 (H) 0.70 - 1.30 mg/dL    GFR MDRD Af Amer 44 (L) >60 mL/min/1.73m2    GFR MDRD Non Af Amer 36 (L) >60 mL/min/1.73m2       .  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  He has continued to decline.  Cognitive scores remain poor with a last CPT of 3.3.  Due to renal impairment he has switched him to Lantus  10 units we will continue to monitor blood sugars and adjust accordingly.  Due to low blood pressures he remains on a lower dose of metoprolol.  Norvasc has been discontinued.  Overall remains wheelchair-bound with profound dementia  wts are stable    Electronically signed by: ADELINE Baptiste  This progress note was completed using Dragon software and there may be grammatical errors.

## 2021-06-19 NOTE — PROGRESS NOTES
[facility]      Code Status:  FULL CODE  Visit Type: Review Of Multiple Medical Conditions     Facility:  [facility] [[id number]]        -2    History of Present Illness: Mo Swift is a 83 y.o. male who is currently admitted to LT on the memory care unit.  He was initially admitted to the TCU as a transfer from the hospital.  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  He was noted to have an aspiration event episode while at the  center.  He underwent a Heimlich maneuver and he was then sent to the emergency room.  His wife who also helps him as his caregiver was in the TCU herself during the same time.  His children were unable to meet his needs and they recommended placement in the TCU for him also for PT OT strengthening and rehab.  He did have a bedside swallow eval with no aspiration event noted and an x-ray chest was negative. He was subsequently discharged on his current medication regimen.      While in the TCU he did quite well with mood and behaviors remain stable though he tested extremely poorly cognitively indicating moderate to severe dementia.  His initial testing slums was 1/30 and a repeat CPT was 3.3. Currently he ambulates using a 4 wheeled walker but remains a high fall risk and 24 hour supervision. Recently per nursing he has been ambulating less though, mostly wc bound.  BS more controlled after initiating basaglar insulin, though BS were consistently 70s in am, lantus discontinued. Previously glipizide was discontinued per worsening renal fx.  Has higher BS at noon/PM, will schedule novolog at noon and PM with SS and monitor.  Intent to just use bolus coverage.     Patient denies pain, headache, chest pain, numbness or tingling, shortest of breath,  eating or swallowing concerns, nausea or vomiting, diarrhea or bowel abnormalities, or no new integumentary concerns today.    Past Medical History:   Diagnosis Date     Alzheimer's dementia      ASCVD (arteriosclerotic cardiovascular disease)      CAD (coronary artery disease)      Chronic diarrhea      CKD (chronic kidney disease) stage 3, GFR 30-59 ml/min      CVA (cerebral vascular accident)     , POST REPAIR OF LEFT HIP FRACTURE, RESIDUAL LEFT SIDE WEAKNESS       DM type 2 (diabetes mellitus, type 2)      Falls 2016    with L side pain     Former smoker      Full code status 2017     Glaucoma      HLD (hyperlipidemia)      HTN (hypertension)      Legally blind      Macular degeneration      Microalbuminuria      PN (peripheral neuropathy)      Pubic bone fracture (H) 2016    L     Stenosis of right carotid artery     Right 75%       Past Surgical History:   Procedure Laterality Date     APPENDECTOMY       CAROTID ENDARTERECTOMY Right      CHOLECYSTECTOMY      Early .  Biopsy of liver.     shock tx      50 years ago fo paranoid schizophrenia     TOTAL HIP ARTHROPLASTY Left      Family History   Problem Relation Age of Onset     Dementia Mother      In stolen car.  Left out (literally) to freeze to death at 88 years.     Kidney disease Father       of kidney disease.     Other Brother      Only recently learned that he existed.  Health is unknown.     Social History     Social History     Marital status:      Spouse name: [place]     Number of children: N/A     Years of education: N/A     Occupational History      Retired     Social History Main Topics     Smoking status: Former Smoker     Quit date: 1987     Smokeless tobacco: Former User      Comment: Oral tobacco only briefly.  Smoked 34 years.     Alcohol use No      Comment: He has a history of issues with alcohol.     Drug use: No     Sexual activity: Not on file     Other Topics Concern     Not on file      Social History Narrative    Patient of Dr. Vela since 2016.         Used to work in the [employer].        Uses a walker.  Goes to day program at [facility] for dementia.     Current Outpatient Prescriptions   Medication Sig Dispense Refill     insulin aspart U-100 (NOVOLOG) 100 unit/mL injection Inject 6 Units under the skin daily with lunch.       amLODIPine (NORVASC) 10 MG tablet Take 10 mg by mouth daily.       cholecalciferol, vitamin D3, 1,000 unit tablet Take 2,000 Units by mouth daily.       insulin aspart U-100 (NOVOLOG) 100 unit/mL injection Inject under the skin 3 (three) times a day before meals. <200                0U    201-249         2U    250-299         4U    300-349         6U    350-399         8U    400-450        10U    451<             12U       insulin aspart U-100 (NOVOLOG) 100 unit/mL injection Inject 4 Units under the skin daily with supper. Hold for  or if not eating >50% of meal        metoprolol tartrate (LOPRESSOR) 25 MG tablet Take 25 mg by mouth 2 (two) times a day.       No current facility-administered medications for this visit.      No Known Allergies    Review of Systems:    Constitutional: Negative.  Negative for fever, chills, has activity change, appetite change and fatigue. Denies pain. Higher BSs at noon.  HENT: Negative for congestion and facial swelling.    Eyes: Negative for photophobia, redness and visual disturbance.   Respiratory: Negative for cough and chest tightness.    Cardiovascular: Negative for chest pain, palpitations and leg swelling.   Gastrointestinal: Negative for nausea, diarrhea, constipation, blood in stool and abdominal distention.   Genitourinary: Negative.    Musculoskeletal: Negative.    Skin: Negative.  Intact.   Neurological: Negative for dizziness, tremors, syncope, weakness, light-headedness and headaches.   Hematological: Does not bruise/bleed easily.   Psychiatric/Behavioral: Negative.  confused at baseline.  Stable.    Vitals:    07/21/18 1655   BP: 134/75   Pulse: (!) 56   Resp: 20   Temp: 97  F (36.1  C)   SpO2: 96%       Physical Exam:    GENERAL: no acute distress. Cooperative.  Denies pain.   HEENT: pupils are equal, round and reactive. Oral mucosa is moist and intact.  Patient has some hearing loss and is legally blind.  RESP:Chest symmetric. Regular respiratory rate. No stridor.  CVS: S1S2, no murmur, rub, or gallop.  ABD: Nondistended, soft. No constipation or diarrhea.  EXTREMITIES: No lower extremity edema.  Left-sided weakness.  More bed bound and uses wc occasionally.   NEURO: no focal deficits. Alert and oriented x2-3. Confused with a very poor recall.  PSYCH: within normal limits. No depression or anxiety.  SKIN: warm dry intact       Labs:    No results found for this or any previous visit (from the past 240 hour(s)).  Lab Results   Component Value Date    HGBA1C 6.9 (H) 05/14/2018     Xr Chest Pa And Lateral  Result Date: 7/21/2017  XR CHEST PA AND LATERAL 7/21/2017 2:49 PM INDICATION: Aspiration COMPARISON: 7/8/2015 FINDINGS: Lung volumes are low with crowding of the pulmonary markings. No definite infiltrate or pleural effusion.    Assessment/Plan:    1.  HTN: Borderline, stable on metoprolol 25mg BID and norvasc 10mg daily, allowed some permissive hypertension.  2.  Last Cr 1.81 with GFR 36, declined in fx  3.  DM: today dc glipizide per worsening renal fx, previously dc'd lantus per hypoglycemia, continue SS three times a day with meals and increase novolog to 6U at noon and maintain 4U at PM, hold for BS >140, last A1c of 6.9 (improved).   4.  Vit D def: continue D3 2000U daily, last vit D 34.1 on 12/2017.  5.  Dementia: No behaviors, stable.  6.  Dysphagia: Continues on mech soft and regular consistency, no change.      The care plan has been reviewed and all orders signed. Changes to care plan, if any, as noted. Otherwise, continue care plan of care.     Electronically signed by: Aldo VALADEZ  Xiomara, MACKENZIE

## 2021-06-19 NOTE — PROGRESS NOTES
[facility]      Code Status:  FULL CODE  Visit Type: Review Of Multiple Medical Conditions     Facility:  [facility] [[id number]]        -2    History of Present Illness: Mo Swift is a 83 y.o. male who is currently admitted to LT on the memory care unit.  He was initially admitted to the TCU as a transfer from the hospital.  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  He was noted to have an aspiration event episode while at the  center.  He underwent a Heimlich maneuver and he was then sent to the emergency room.  His wife who also helps him as his caregiver was in the TCU herself during the same time.  His children were unable to meet his needs and they recommended placement in the TCU for him also for PT OT strengthening and rehab.  He did have a bedside swallow eval with no aspiration event noted and an x-ray chest was negative. He was subsequently discharged on his current medication regimen.      While in the TCU he did quite well with mood and behaviors remain stable though he tested extremely poorly cognitively indicating moderate to severe dementia.  His initial testing slums was 1/30 and a repeat CPT was 3.3. Currently he ambulates using a 4 wheeled walker but remains a high fall risk and 24 hour supervision. Recently per nursing he has been ambulating less though, mostly wc bound.  BS more controlled after initiating basaglar insulin, though BS were consistently 70s in am, lantus discontinued. Previously glipizide was discontinued per worsening renal fx.  Has higher BS at noon/PM, will schedule novolog at noon and PM with SS and monitor.  Intent to just use bolus coverage.     Patient denies pain, headache, chest pain, numbness or tingling, shortest of breath,  eating or swallowing concerns, nausea or vomiting, diarrhea or bowel abnormalities, or no new integumentary concerns today.    Past Medical History:   Diagnosis Date     Alzheimer's dementia      ASCVD (arteriosclerotic cardiovascular disease)      CAD (coronary artery disease)      Chronic diarrhea      CKD (chronic kidney disease) stage 3, GFR 30-59 ml/min      CVA (cerebral vascular accident)     , POST REPAIR OF LEFT HIP FRACTURE, RESIDUAL LEFT SIDE WEAKNESS       DM type 2 (diabetes mellitus, type 2)      Falls 2016    with L side pain     Former smoker      Full code status 2017     Glaucoma      HLD (hyperlipidemia)      HTN (hypertension)      Legally blind      Macular degeneration      Microalbuminuria      PN (peripheral neuropathy)      Pubic bone fracture (H) 2016    L     Stenosis of right carotid artery     Right 75%       Past Surgical History:   Procedure Laterality Date     APPENDECTOMY       CAROTID ENDARTERECTOMY Right      CHOLECYSTECTOMY      Early .  Biopsy of liver.     shock tx      50 years ago fo paranoid schizophrenia     TOTAL HIP ARTHROPLASTY Left      Family History   Problem Relation Age of Onset     Dementia Mother      In stolen car.  Left out (literally) to freeze to death at 88 years.     Kidney disease Father       of kidney disease.     Other Brother      Only recently learned that he existed.  Health is unknown.     Social History     Social History     Marital status:      Spouse name: [place]     Number of children: N/A     Years of education: N/A     Occupational History      Retired     Social History Main Topics     Smoking status: Former Smoker     Quit date: 1987     Smokeless tobacco: Former User      Comment: Oral tobacco only briefly.  Smoked 34 years.     Alcohol use No      Comment: He has a history of issues with alcohol.     Drug use: No     Sexual activity: Not on file     Other Topics Concern     Not on file      Social History Narrative    Patient of Dr. Vela since 2016.         Used to work in the [employer].        Uses a walker.  Goes to day program at [facility] for dementia.     Current Outpatient Prescriptions   Medication Sig Dispense Refill     amLODIPine (NORVASC) 10 MG tablet Take 10 mg by mouth daily.       cholecalciferol, vitamin D3, 1,000 unit tablet Take 2,000 Units by mouth daily.       insulin aspart U-100 (NOVOLOG) 100 unit/mL injection Inject under the skin 3 (three) times a day before meals. <200                0U    201-249         2U    250-299         4U    300-349         6U    350-399         8U    400-450        10U    451<             12U       insulin aspart U-100 (NOVOLOG) 100 unit/mL injection Inject 6 Units under the skin daily with supper. Hold for  or if not eating >50% of meal        insulin aspart U-100 (NOVOLOG) 100 unit/mL injection Inject 8 Units under the skin daily with lunch.        metoprolol tartrate (LOPRESSOR) 25 MG tablet Take 25 mg by mouth 2 (two) times a day.       No current facility-administered medications for this visit.      No Known Allergies    Review of Systems:    Constitutional: Negative.  Negative for fever, chills, has activity change, appetite change and fatigue. Denies pain. Continues to have higher BSs at noon.  HENT: Negative for congestion and facial swelling.    Eyes: Negative for photophobia, redness and visual disturbance.   Respiratory: Negative for cough and chest tightness.    Cardiovascular: Negative for chest pain, palpitations and leg swelling.   Gastrointestinal: Negative for nausea, diarrhea, constipation, blood in stool and abdominal distention.   Genitourinary: Negative.    Musculoskeletal: Negative.    Skin: Negative.  Intact.   Neurological: Negative for dizziness, tremors, syncope, weakness, light-headedness and headaches.   Hematological: Does not bruise/bleed easily.   Psychiatric/Behavioral: Negative.   confused at baseline. Stable.    Vitals:    07/26/18 1313   BP: 134/75   Pulse: (!) 56   Resp: 18   Temp: 97  F (36.1  C)   SpO2: 92%       Physical Exam:    GENERAL: no acute distress. Cooperative.  Denies pain.   HEENT: pupils are equal, round and reactive. Oral mucosa is moist and intact.  Patient has some hearing loss and is legally blind.  RESP:Chest symmetric. Regular respiratory rate. No stridor.  CVS: S1S2, no murmur, rub, or gallop.  ABD: Nondistended, soft. No constipation or diarrhea.  EXTREMITIES: No lower extremity edema.  Left-sided weakness.  More bed bound and uses wc occasionally.   NEURO: no focal deficits. Alert and oriented x2-3. Confused with a very poor recall.  PSYCH: within normal limits. No depression or anxiety.  SKIN: warm dry intact       Labs:    No results found for this or any previous visit (from the past 240 hour(s)).  Lab Results   Component Value Date    HGBA1C 6.9 (H) 05/14/2018     Xr Chest Pa And Lateral  Result Date: 7/21/2017  XR CHEST PA AND LATERAL 7/21/2017 2:49 PM INDICATION: Aspiration COMPARISON: 7/8/2015 FINDINGS: Lung volumes are low with crowding of the pulmonary markings. No definite infiltrate or pleural effusion.    Assessment/Plan:    1.  HTN: Borderline, stable on metoprolol 25mg BID and norvasc 10mg daily, allowed some permissive hypertension.  2.  Last Cr 1.81 with GFR 36, declined in fx  3.  DM: today dc glipizide per worsening renal fx, previously dc'd lantus per hypoglycemia, continue SS three times a day with meals and increase novolog to 8U at noon and maintain 6U at PM, hold for BS >140, last A1c of 6.9 (improved).   4.  Vit D def: continue D3 2000U daily, last vit D 34.1 on 12/2017.  5.  Dementia: No behaviors, stable.  6.  Dysphagia: Continues on mech soft and regular consistency, no change.      The care plan has been reviewed and all orders signed. Changes to care plan, if any, as noted. Otherwise, continue care plan of care.     Electronically signed  by: Aldo Solano, NP

## 2021-06-19 NOTE — LETTER
Letter by Aldo Solano NP at      Author: Aldo Solano NP Service: -- Author Type: --    Filed:  Encounter Date: 4/25/2019 Status: (Other)         Patient: Mo Swift   MR Number: [id number]   YOB: 1934   Date of Visit: 4/25/2019     [facility]      Code Status:  FULL CODE  Visit Type: Review Of Multiple Medical Conditions     Facility:  [facility] [[id number]]         301    History of Present Illness: Mo Swift is a 84 y.o. male who is currently admitted to [facility] on the memory care unit.  He was initially admitted to the TCU as a transfer from the hospital.  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  He was noted to have an aspiration event episode while at the  center.  He underwent a Heimlich maneuver and he was then sent to the emergency room.  His wife who also helps him as his caregiver was in the TCU herself during the same time.  His children were unable to meet his needs and they recommended placement in the TCU for him also for PT OT strengthening and rehab.  He did have a bedside swallow eval with no aspiration event noted and an x-ray chest was negative. He was subsequently discharged on his current medication regimen.      While in the TCU he did quite well with mood and behaviors remain stable though he tested extremely poorly cognitively indicating moderate to severe dementia.  His initial testing slums was 1/30 and a repeat CPT was 3.3. Currently he ambulates using a 4 wheeled walker but remains a high fall risk and 24 hour supervision. Recently per nursing he has been ambulating less though, mostly wc bound now.  BS were more controlled after initiating basaglar insulin, though BS were consistently 70s in am, lantus  discontinued. Previously glipizide was discontinued per worsening renal fx, though it has been restarted without renal fx recheck. BSs variable, frequently being held at noon and PM time. Today will continue to titrate metoprolol as he is declining.    Patient denies pain, headache, chest pain, numbness or tingling, shortest of breath, eating or swallowing concerns, nausea or vomiting, diarrhea or bowel abnormalities, or no new integumentary concerns today.    Past Medical History:   Diagnosis Date   ? Alzheimer's dementia    ? ASCVD (arteriosclerotic cardiovascular disease)    ? CAD (coronary artery disease)    ? Chronic diarrhea    ? CKD (chronic kidney disease) stage 3, GFR 30-59 ml/min    ? CVA (cerebral vascular accident)     , POST REPAIR OF LEFT HIP FRACTURE, RESIDUAL LEFT SIDE WEAKNESS     ? DM type 2 (diabetes mellitus, type 2)    ? Falls 2016    with L side pain   ? Former smoker    ? Full code status 2017   ? Glaucoma    ? HLD (hyperlipidemia)    ? HTN (hypertension)    ? Legally blind    ? Macular degeneration    ? Microalbuminuria    ? PN (peripheral neuropathy)    ? Pubic bone fracture (H) 2016    L   ? Stenosis of right carotid artery     Right 75%       Past Surgical History:   Procedure Laterality Date   ? APPENDECTOMY     ? CAROTID ENDARTERECTOMY Right    ? CHOLECYSTECTOMY      Early 's.  Biopsy of liver.   ? shock tx      50 years ago fo paranoid schizophrenia   ? TOTAL HIP ARTHROPLASTY Left      Family History   Problem Relation Age of Onset   ? Dementia Mother         In stolen car.  Left out (literally) to freeze to death at 88 years.   ? Kidney disease Father          of kidney disease.   ? Other Brother         Only recently learned that he existed.  Health is unknown.     Social History     Socioeconomic History   ? Marital status:      Spouse name: [place]   ? Number of children: Not on file   ? Years of education: Not on file   ? Highest education  level: Not on file   Occupational History     Employer: RETIRED   Social Needs   ? Financial resource strain: Not on file   ? Food insecurity:     Worry: Not on file     Inability: Not on file   ? Transportation needs:     Medical: Not on file     Non-medical: Not on file   Tobacco Use   ? Smoking status: Former Smoker     Last attempt to quit: 1987     Years since quittin.9   ? Smokeless tobacco: Former User   ? Tobacco comment: Oral tobacco only briefly.  Smoked 34 years.   Substance and Sexual Activity   ? Alcohol use: No     Comment: He has a history of issues with alcohol.   ? Drug use: No   ? Sexual activity: Not on file   Lifestyle   ? Physical activity:     Days per week: Not on file     Minutes per session: Not on file   ? Stress: Not on file   Relationships   ? Social connections:     Talks on phone: Not on file     Gets together: Not on file     Attends Judaism service: Not on file     Active member of club or organization: Not on file     Attends meetings of clubs or organizations: Not on file     Relationship status: Not on file   ? Intimate partner violence:     Fear of current or ex partner: Not on file     Emotionally abused: Not on file     Physically abused: Not on file     Forced sexual activity: Not on file   Other Topics Concern   ? Not on file   Social History Narrative    Patient of Dr. Vela since 2016.         Used to work in the [employer].        Uses a walker.  Goes to day program at [facility] for dementia.     Current Outpatient Medications   Medication Sig Dispense Refill   ? cholecalciferol, vitamin D3, 1,000 unit tablet Take 2,000 Units by mouth daily.     ? glipiZIDE (GLUCOTROL XL) 5 MG 24 hr tablet Take 5 mg by mouth daily with breakfast.     ? insulin aspart U-100 (NOVOLOG) 100 unit/mL injection Inject under the skin 3 (three) times a day with meals. <200                0U    201-249         2U    250-299         4U    300-349         6U     350-399         8U    400-450        10U    451<             12U            ? insulin aspart U-100 (NOVOLOG) 100 unit/mL injection Inject 4 Units under the skin daily with supper. Hold for  or if not eating >50% of meal            ? metoprolol tartrate (LOPRESSOR) 25 MG tablet Take 6.25 mg by mouth 2 (two) times a day. Hold for SBP <140 or HR <60             No current facility-administered medications for this visit.      No Known Allergies    Review of Systems:    Constitutional: Negative.  Negative for fever, chills, has activity change, appetite change and fatigue. Denies pain. BSs variable.  HENT: Negative for congestion and facial swelling.    Eyes: Negative for photophobia, redness and visual disturbance.   Respiratory: Negative for cough and chest tightness.    Cardiovascular: Negative for chest pain, palpitations and leg swelling.   Gastrointestinal: Negative for nausea, diarrhea, constipation, blood in stool and abdominal distention.   Genitourinary: Negative.    Musculoskeletal: Negative.    Skin: Negative.  Intact.   Neurological: Negative for dizziness, tremors, syncope, weakness, light-headedness and headaches.   Hematological: Does not bruise/bleed easily.   Psychiatric/Behavioral: Negative.  confused at baseline. Stable.    Vitals:    04/25/19 1848   BP: 123/68   Pulse: 80   Resp: 18   Temp: 98  F (36.7  C)   SpO2: 96%       Physical Exam:    GENERAL: no acute distress. Cooperative.  Denies pain.   HEENT: pupils are equal, round and reactive. Oral mucosa is moist and intact. Patient has some hearing loss and is legally blind.  RESP:Chest symmetric. Regular respiratory rate. No stridor.  Dim, RA.  CVS: RRR, S1S2, no murmur, rub, or gallop.  ABD: Nondistended, soft. No constipation or diarrhea.  EXTREMITIES: No lower extremity edema.  Left-sided weakness.  More bed bound and uses wc occasionally.   NEURO: no focal deficits. Alert and oriented x1. Confused with a very poor recall.  Declining.    PSYCH: within normal limits. No depression or anxiety.  SKIN: warm dry intact       Labs:    No results found for this or any previous visit (from the past 240 hour(s)).  Lab Results   Component Value Date    HGBA1C 8.0 (H) 12/26/2018     Results for orders placed or performed in visit on 05/14/18   Basic Metabolic Panel   Result Value Ref Range    Sodium 138 136 - 145 mmol/L    Potassium 4.4 3.5 - 5.0 mmol/L    Chloride 105 98 - 107 mmol/L    CO2 25 22 - 31 mmol/L    Anion Gap, Calculation 8 5 - 18 mmol/L    Glucose 143 (H) 70 - 125 mg/dL    Calcium 8.6 8.5 - 10.5 mg/dL    BUN 37 (H) 8 - 28 mg/dL    Creatinine 1.81 (H) 0.70 - 1.30 mg/dL    GFR MDRD Af Amer 44 (L) >60 mL/min/1.73m2    GFR MDRD Non Af Amer 36 (L) >60 mL/min/1.73m2           Xr Chest Pa And Lateral  Result Date: 7/21/2017  XR CHEST PA AND LATERAL 7/21/2017 2:49 PM INDICATION: Aspiration COMPARISON: 7/8/2015 FINDINGS: Lung volumes are low with crowding of the pulmonary markings. No definite infiltrate or pleural effusion.    Assessment/Plan:    1.  HTN: now hypotensive, metoprolol decreased to 6.25mg BID and norvasc has been discontinued  2.  Last Cr 1.81 with GFR 36 on 5/14/18, declined in fx  3.  DM: recently glipizide has been restarted though renal fx has not been rechecked, previously dc'd lantus per hypoglycemia, continue SS three times a day with meals and discontinued novolog at noon and continue 4U at PM, hold for BS >140, last A1c 8.0 on 12/26/18  4.  Vit D def: continue D3 2000U daily, last vit D 34.1 on 12/2017.  5.  Dementia: No behaviors, stable.  6.  Dysphagia: Continues on mech soft and regular consistency, no change.  7.  Acute hyperglycemia: rare for BS >250 now       Electronically signed by: Aldo Solano NP

## 2021-06-20 NOTE — PROGRESS NOTES
[facility]      Code Status:  FULL CODE  Visit Type: Review Of Multiple Medical Conditions     Facility:  [facility] [[id number]]           History of Present Illness: Mo Swift is a 83 y.o. male who is currently a resident of [facility].    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  His initial testing slums was 1/30 and a repeat CPT was 3.3/ 5.6.  His current BMs is 3  To ambulate using a 4 wheeled walker but remains a high fall risk .  He recently had a fall but no injuries  He generally remains WC bound and does not do much-sleeps all day  No change in mood and behavior however blood sugars have been trending higher his last A1c however was stable  Past Medical History:   Diagnosis Date     Alzheimer's dementia      ASCVD (arteriosclerotic cardiovascular disease)      CAD (coronary artery disease)      Chronic diarrhea      CKD (chronic kidney disease) stage 3, GFR 30-59 ml/min      CVA (cerebral vascular accident)     2010, POST REPAIR OF LEFT HIP FRACTURE, RESIDUAL LEFT SIDE WEAKNESS       DM type 2 (diabetes mellitus, type 2)      Falls 06/02/2016    with L side pain     Former smoker      Full code status 1/22/2017     Glaucoma      HLD (hyperlipidemia)      HTN (hypertension)      Legally blind      Macular degeneration      Microalbuminuria      PN (peripheral neuropathy)      Pubic bone fracture (H) 06/02/2016    L     Stenosis of right carotid artery     Right 75%       Past Surgical History:   Procedure Laterality Date     APPENDECTOMY       CAROTID ENDARTERECTOMY Right      CHOLECYSTECTOMY      Early 30's.  Biopsy of liver.     shock tx      50 years ago fo paranoid schizophrenia     TOTAL HIP ARTHROPLASTY  Left      Family History   Problem Relation Age of Onset     Dementia Mother      In stolen car.  Left out (literally) to freeze to death at 88 years.     Kidney disease Father       of kidney disease.     Other Brother      Only recently learned that he existed.  Health is unknown.     Social History     Social History     Marital status:      Spouse name: [place]     Number of children: N/A     Years of education: N/A     Occupational History      Retired     Social History Main Topics     Smoking status: Former Smoker     Quit date: 1987     Smokeless tobacco: Former User      Comment: Oral tobacco only briefly.  Smoked 34 years.     Alcohol use No      Comment: He has a history of issues with alcohol.     Drug use: No     Sexual activity: Not on file     Other Topics Concern     Not on file     Social History Narrative    Patient of Dr. Vela since 2016.         Used to work in the [employer].        Uses a walker.  Goes to day program at [facility] for dementia.     Current Outpatient Prescriptions   Medication Sig Dispense Refill     amLODIPine (NORVASC) 10 MG tablet Take 10 mg by mouth daily.       cholecalciferol, vitamin D3, 1,000 unit tablet Take 2,000 Units by mouth daily.       insulin aspart U-100 (NOVOLOG) 100 unit/mL injection Inject under the skin 3 (three) times a day before meals. <200                0U    201-249         2U    250-299         4U    300-349         6U    350-399         8U    400-450        10U    451<             12U       insulin aspart U-100 (NOVOLOG) 100 unit/mL injection Inject 6 Units under the skin daily with supper. Hold for  or if not eating >50% of meal        insulin aspart U-100 (NOVOLOG) 100 unit/mL injection Inject 10 Units under the skin daily with lunch.        metoprolol tartrate (LOPRESSOR) 25 MG tablet Take 25 mg by mouth 2 (two) times a day.       No current facility-administered medications for this visit.       No Known Allergies      Review of Systems:    Constitutional: Negative.  Negative for fever, chills, has activity change, appetite change and fatigue.   HENT: Negative for congestion and facial swelling.    Eyes: Negative for photophobia, redness and visual disturbance.   Respiratory: Negative for cough and chest tightness.    Cardiovascular: Negative for chest pain, palpitations and leg swelling.   Gastrointestinal: Negative for nausea, diarrhea, constipation, blood in stool and abdominal distention.   Genitourinary: Negative.    Musculoskeletal: Negative.    Has a fall recently reported by nursing also had ecchymosis of his wrist  Skin: Negative.    Neurological: Negative for dizziness, tremors, syncope, weakness, light-headedness and headaches.   Hematological: Does not bruise/bleed easily.   Psychiatric/Behavioral: Negative.  confused    wts 144lb bp 144/73 p84    Physical Exam:    GENERAL: no acute distress. Cooperative in conversation.   HEENT: pupils are equal, round and reactive. Oral mucosa is moist and intact.  Patient has some hearing loss and is legally blind  RESP:Chest symmetric. Regular respiratory rate. No stridor.  CVS: S1S2  ABD: Nondistended, soft.  EXTREMITIES: No lower extremity edema.  Left-sided weakness with some spasticity of his hand noted uses a walker for ambulation  NEURO: non focal. Alert and oriented xself. Confused with a very poor recall  PSYCH: within normal limits. No depression or anxiety.  SKIN: warm dry intact     Labs:      Lab Results   Component Value Date    HGBA1C 6.9 (H) 05/14/2018     Results for orders placed or performed in visit on 05/14/18   Basic Metabolic Panel   Result Value Ref Range    Sodium 138 136 - 145 mmol/L    Potassium 4.4 3.5 - 5.0 mmol/L    Chloride 105 98 - 107 mmol/L    CO2 25 22 - 31 mmol/L    Anion Gap, Calculation 8 5 - 18 mmol/L    Glucose 143 (H) 70 - 125 mg/dL    Calcium 8.6 8.5 - 10.5 mg/dL    BUN 37 (H) 8 - 28 mg/dL    Creatinine 1.81 (H)  0.70 - 1.30 mg/dL    GFR MDRD Af Amer 44 (L) >60 mL/min/1.73m2    GFR MDRD Non Af Amer 36 (L) >60 mL/min/1.73m2       .  Assessment/Plan:    Alzheimer's dementia with initial testing slums of 1/30; CPT of 3.3/5.6 BIMS 3/15  Status post V CVA with left lower extremity weakness chronic  Diabetes type 2-  Chronic kidney disease  Legal blindness  Debilitation in a patient who remains a very high fall risk in light of his visual impairment and cognitive impairments  Hyperlipidemia o  Hypertension   Patient is in long-term care with no significant behavioral issues noted   recent falls but no injury. most of the time he tends to sleep and remains wheelchair-bound.  He is ambulating less and sleeping more and eating less his weights are stable  Last A1c was 6.9 but he has had some elevated blood sugars requiring sliding scale coverage continue to monitor trend  Has had worsening of kidney functions .  He is not on any significant nephrotoxins.  No new changes made today    Electronically signed by: ADELINE Baptiste  This progress note was completed using Dragon software and there may be grammatical errors.

## 2021-06-20 NOTE — LETTER
Letter by Aldo Solano NP at      Author: Aldo Solano NP Service: -- Author Type: --    Filed:  Encounter Date: 1/3/2020 Status: Signed         Patient: Mo Swift   MR Number: [id number]   YOB: 1934   Date of Visit: 1/3/2020     [facility]      Code Status:  FULL CODE  Visit Type: FVP Care Coordination - Home Visit-Annual Assessment     Facility:  [facility] [[id number]]            History of Present Illness: Mo Swift is a 85 y.o. male who is currently admitted to LTC on the memory care unit.  He was initially admitted to the TCU as a transfer from the hospital.  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  He was noted to have an aspiration event episode while at the  center.  He underwent a Heimlich maneuver and he was then sent to the emergency room.  His wife who also helps him as his caregiver was in the TCU herself during the same time.  His children were unable to meet his needs and they recommended placement in the TCU for him also for PT OT strengthening and rehab.  He did have a bedside swallow eval with no aspiration event noted and an x-ray chest was negative. He was subsequently discharged on his current medication regimen.      LTC:  He currently resides in LTC.  He mostly wc bound now and declining physically/mentally on hospice through HE. Previously glipizide was discontinued per worsening renal fx, was restarted, though discontinued again per renal fx. He is maintained on metoprolol and lantus per family request.      Past Medical History:   Diagnosis Date   ? Alzheimer's dementia (H)    ? ASCVD (arteriosclerotic cardiovascular disease)    ? CAD (coronary artery disease)    ? Chronic diarrhea    ? CKD (chronic kidney  disease) stage 3, GFR 30-59 ml/min    ? CVA (cerebral vascular accident)     , POST REPAIR OF LEFT HIP FRACTURE, RESIDUAL LEFT SIDE WEAKNESS     ? DM type 2 (diabetes mellitus, type 2)    ? Falls 2016    with L side pain   ? Former smoker    ? Full code status 2017   ? Glaucoma    ? HLD (hyperlipidemia)    ? HTN (hypertension)    ? Legally blind    ? Macular degeneration    ? Microalbuminuria    ? PN (peripheral neuropathy)    ? Pubic bone fracture (H) 2016    L   ? Stenosis of right carotid artery     Right 75%       Past Surgical History:   Procedure Laterality Date   ? APPENDECTOMY     ? CAROTID ENDARTERECTOMY Right    ? CHOLECYSTECTOMY      Early .  Biopsy of liver.   ? shock tx      50 years ago fo paranoid schizophrenia   ? TOTAL HIP ARTHROPLASTY Left      Family History   Problem Relation Age of Onset   ? Dementia Mother         In stolen car.  Left out (literally) to freeze to death at 88 years.   ? Kidney disease Father          of kidney disease.   ? Other Brother         Only recently learned that he existed.  Health is unknown.     Social History     Socioeconomic History   ? Marital status:      Spouse name: [place]   ? Number of children: Not on file   ? Years of education: Not on file   ? Highest education level: Not on file   Occupational History     Employer: RETIRED   Social Needs   ? Financial resource strain: Not on file   ? Food insecurity:     Worry: Not on file     Inability: Not on file   ? Transportation needs:     Medical: Not on file     Non-medical: Not on file   Tobacco Use   ? Smoking status: Former Smoker     Last attempt to quit: 1987     Years since quittin.6   ? Smokeless tobacco: Former User   ? Tobacco comment: Oral tobacco only briefly.  Smoked 34 years.   Substance and Sexual Activity   ? Alcohol use: No     Comment: He has a history of issues with alcohol.   ? Drug use: No   ? Sexual activity: Not on file   Lifestyle   ?  Physical activity:     Days per week: Not on file     Minutes per session: Not on file   ? Stress: Not on file   Relationships   ? Social connections:     Talks on phone: Not on file     Gets together: Not on file     Attends Latter day service: Not on file     Active member of club or organization: Not on file     Attends meetings of clubs or organizations: Not on file     Relationship status: Not on file   ? Intimate partner violence:     Fear of current or ex partner: Not on file     Emotionally abused: Not on file     Physically abused: Not on file     Forced sexual activity: Not on file   Other Topics Concern   ? Not on file   Social History Narrative    Patient of Dr. Vela since 2016.         Used to work in the [employer].        Uses a walker.  [facility].     Current Outpatient Medications   Medication Sig Dispense Refill   ? acetaminophen (TYLENOL) 325 MG tablet Take 2 tablets (650 mg total) by mouth every 4 (four) hours as needed.  0   ? cholecalciferol, vitamin D3, 1,000 unit tablet Take 2,000 Units by mouth daily.     ? insulin glargine (LANTUS) 100 unit/mL injection Inject 16 Units under the skin every morning. T.O. Dr. Lima/Sherri Fields RN     ? metoprolol succinate (TOPROL-XL) 25 MG Take 6.25 mg by mouth daily. Hold for HR <60 or SBP <100     ? omeprazole (PRILOSEC) 20 MG capsule Take 1 capsule (20 mg total) by mouth daily before breakfast.  0     No current facility-administered medications for this visit.      No Known Allergies    Review of Systems:    Constitutional: Negative.  Negative for fever, chills, has activity change, appetite change and fatigue. Denies pain. BSs variable. On hospice.  HENT: Negative for congestion and facial swelling.    Eyes: Negative for photophobia, redness and visual disturbance.   Respiratory: Negative for cough and chest tightness.    Cardiovascular: Negative for chest pain, palpitations and leg  swelling.   Gastrointestinal: Negative for nausea, diarrhea, constipation, blood in stool and abdominal distention.   Genitourinary: Negative.    Musculoskeletal: Negative.    Skin: Chronic coccyx PU   Neurological: Negative for dizziness, tremors, syncope, weakness, light-headedness and headaches.   Hematological: Does not bruise/bleed easily.   Psychiatric/Behavioral: Negative.  confused at baseline. Stable.    Vitals:    01/03/20 1418   BP: 134/55   Pulse: 77   Resp: 16   Temp: 97  F (36.1  C)   SpO2: 94%       Physical Exam:    GENERAL: no acute distress. Cooperative.  Denies pain. Continues on hospice with HE.   HEENT: pupils are equal, round and reactive. Oral mucosa is moist and intact. Patient has some hearing loss and is legally blind.  RESP:Chest symmetric. Regular respiratory rate. No stridor.  Dim, RA.  CVS: RRR, S1S2, no murmur, rub, or gallop.  ABD: Nondistended, soft. No constipation or diarrhea.  EXTREMITIES: No lower extremity edema.  Left-sided weakness.  More bed bound and uses wc occasionally.   NEURO: no focal deficits. Alert and oriented x1. Confused with a very poor recall. Declining.   PSYCH: within normal limits. No depression or anxiety.  SKIN: chronic coccyx PU      Labs:    No results found for this or any previous visit (from the past 240 hour(s)).  Lab Results   Component Value Date    HGBA1C 7.6 (H) 10/18/2019     Results for orders placed or performed during the hospital encounter of 11/26/19   Basic Metabolic Panel   Result Value Ref Range    Sodium 140 136 - 145 mmol/L    Potassium 4.5 3.5 - 5.0 mmol/L    Chloride 109 (H) 98 - 107 mmol/L    CO2 21 (L) 22 - 31 mmol/L    Anion Gap, Calculation 10 5 - 18 mmol/L    Glucose 102 70 - 125 mg/dL    Calcium 8.4 (L) 8.5 - 10.5 mg/dL    BUN 34 (H) 8 - 28 mg/dL    Creatinine 1.59 (H) 0.70 - 1.30 mg/dL    GFR MDRD Af Amer 50 (L) >60 mL/min/1.73m2    GFR MDRD Non Af Amer 42 (L) >60 mL/min/1.73m2         Assessment/Plan:    HTN: now hypotensive,  continue metoprolol 6.25mg daily, norvasc has been discontinued, attempted to dc though family wants to continue, SBP <130    MAGDALENE: Last Cr 1.80 with GFR 36 on 4/30/19, declined in fx, no longer monitoring per hospice    DM: last A1c 7.7 on 5/2019. Continue lantus to 16U in am, monitor    Vit D def: continue D3 2000U daily, last vit D 34.1 on 12/2017    Dementia: No behaviors, stable.    Dysphagia: Continues on mech soft and regular consistency, no change.    Hospice care: ongoing with HE, initiated 11/2019      Case Management:  I have reviewed the facility/SNF care plan/MDS which was done 12/12/19, including the falls risk, nutrition and pain screening. I also reviewed the current immunizations, and preventive care.. Future cancer screening is not clinically indicated secondary to age/goals of care.   Patient's desire to return to the community is not assessible due to cognitive impairment.    Advance Directive Discussion:    I reviewed the current advanced directives as reflected in EPIC and the facility chart. I did not due to cognitive impairment review the advance directives with the resident.     Team Discussion:  I communicated with the appropriate disciplines involved with the Plan of Care:   Nursing      Patient Goal:  Patient's goal is unobtainable secondary to cognitive impairment.    Information reviewed:  Medications, vital signs, orders, and nursing notes.    Electronically signed by: Aldo Solano NP

## 2021-06-21 NOTE — PROGRESS NOTES
[facility]      Code Status:  FULL CODE  Visit Type: Problem Visit     Facility:  [facility] [[id number]]        -2    History of Present Illness: Mo Swift is a 83 y.o. male who is currently admitted to LT on the memory care unit.  He was initially admitted to the TCU as a transfer from the hospital.  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  He was noted to have an aspiration event episode while at the  center.  He underwent a Heimlich maneuver and he was then sent to the emergency room.  His wife who also helps him as his caregiver was in the TCU herself during the same time.  His children were unable to meet his needs and they recommended placement in the TCU for him also for PT OT strengthening and rehab.  He did have a bedside swallow eval with no aspiration event noted and an x-ray chest was negative. He was subsequently discharged on his current medication regimen.      While in the TCU he did quite well with mood and behaviors remain stable though he tested extremely poorly cognitively indicating moderate to severe dementia.  His initial testing slums was 1/30 and a repeat CPT was 3.3. Currently he ambulates using a 4 wheeled walker but remains a high fall risk and 24 hour supervision. Recently per nursing he has been ambulating less though, mostly wc bound.  BS more controlled after initiating basaglar insulin, though BS were consistently 70s in am, lantus discontinued. Previously glipizide was discontinued per worsening renal fx.  Has higher BS at dinner, now will increase coverage again.  Intent to just use bolus coverage.    Patient denies pain, headache, chest pain, numbness or tingling, shortest of breath, eating or swallowing concerns, nausea or vomiting,  diarrhea or bowel abnormalities, or no new integumentary concerns today.    Past Medical History:   Diagnosis Date     Alzheimer's dementia      ASCVD (arteriosclerotic cardiovascular disease)      CAD (coronary artery disease)      Chronic diarrhea      CKD (chronic kidney disease) stage 3, GFR 30-59 ml/min      CVA (cerebral vascular accident)     , POST REPAIR OF LEFT HIP FRACTURE, RESIDUAL LEFT SIDE WEAKNESS       DM type 2 (diabetes mellitus, type 2)      Falls 2016    with L side pain     Former smoker      Full code status 2017     Glaucoma      HLD (hyperlipidemia)      HTN (hypertension)      Legally blind      Macular degeneration      Microalbuminuria      PN (peripheral neuropathy)      Pubic bone fracture (H) 2016    L     Stenosis of right carotid artery     Right 75%       Past Surgical History:   Procedure Laterality Date     APPENDECTOMY       CAROTID ENDARTERECTOMY Right      CHOLECYSTECTOMY      Early .  Biopsy of liver.     shock tx      50 years ago fo paranoid schizophrenia     TOTAL HIP ARTHROPLASTY Left      Family History   Problem Relation Age of Onset     Dementia Mother      In stolen car.  Left out (literally) to freeze to death at 88 years.     Kidney disease Father       of kidney disease.     Other Brother      Only recently learned that he existed.  Health is unknown.     Social History     Social History     Marital status:      Spouse name: [place]     Number of children: N/A     Years of education: N/A     Occupational History      Retired     Social History Main Topics     Smoking status: Former Smoker     Quit date: 1987     Smokeless tobacco: Former User      Comment: Oral tobacco only briefly.  Smoked 34 years.     Alcohol use No      Comment: He has a history of issues with alcohol.     Drug use: No     Sexual activity: Not on file     Other Topics Concern     Not on file     Social History Narrative    Patient of Dr. Vela since  2016.         Used to work in the [employer].        Uses a walker.  [facility] program [facility] dementia.     Current Outpatient Prescriptions   Medication Sig Dispense Refill     amLODIPine (NORVASC) 10 MG tablet Take 10 mg by mouth daily.       cholecalciferol, vitamin D3, 1,000 unit tablet Take 2,000 Units by mouth daily.       insulin aspart U-100 (NOVOLOG) 100 unit/mL injection Inject under the skin 3 (three) times a day before meals. <200                0U    201-249         2U    250-299         4U    300-349         6U    350-399         8U    400-450        10U    451<             12U       insulin aspart U-100 (NOVOLOG) 100 unit/mL injection Inject 8 Units under the skin daily with supper. Hold for  or if not eating >50% of meal        insulin aspart U-100 (NOVOLOG) 100 unit/mL injection Inject 10 Units under the skin daily with lunch.        metoprolol tartrate (LOPRESSOR) 25 MG tablet Take 25 mg by mouth 2 (two) times a day.       No current facility-administered medications for this visit.      No Known Allergies    Review of Systems:    Constitutional: Negative.  Negative for fever, chills, has activity change, appetite change and fatigue. Denies pain. Continues to have higher BSs at noon.  HENT: Negative for congestion and facial swelling.    Eyes: Negative for photophobia, redness and visual disturbance.   Respiratory: Negative for cough and chest tightness.    Cardiovascular: Negative for chest pain, palpitations and leg swelling.   Gastrointestinal: Negative for nausea, diarrhea, constipation, blood in stool and abdominal distention.   Genitourinary: Negative.    Musculoskeletal: Negative.    Skin: Negative.  Intact.   Neurological: Negative for dizziness, tremors, syncope, weakness, light-headedness and headaches.   Hematological: Does not bruise/bleed easily.   Psychiatric/Behavioral: Negative.  confused at baseline. Stable.    Vitals:    10/20/18 1102    BP: 150/60   Pulse: (!) 57   Resp: 18   Temp: 97  F (36.1  C)   SpO2: 96%       Physical Exam:    GENERAL: no acute distress. Cooperative.  Denies pain.   HEENT: pupils are equal, round and reactive. Oral mucosa is moist and intact. Patient has some hearing loss and is legally blind.  RESP:Chest symmetric. Regular respiratory rate. No stridor.  CVS: RRR, S1S2, no murmur, rub, or gallop.  ABD: Nondistended, soft. No constipation or diarrhea.  EXTREMITIES: No lower extremity edema.  Left-sided weakness.  More bed bound and uses wc occasionally.   NEURO: no focal deficits. Alert and oriented x2. Confused with a very poor recall.  Declining.   PSYCH: within normal limits. No depression or anxiety.  SKIN: warm dry intact       Labs:    No results found for this or any previous visit (from the past 240 hour(s)).  Lab Results   Component Value Date    HGBA1C 6.9 (H) 05/14/2018     Results for orders placed or performed in visit on 05/14/18   Basic Metabolic Panel   Result Value Ref Range    Sodium 138 136 - 145 mmol/L    Potassium 4.4 3.5 - 5.0 mmol/L    Chloride 105 98 - 107 mmol/L    CO2 25 22 - 31 mmol/L    Anion Gap, Calculation 8 5 - 18 mmol/L    Glucose 143 (H) 70 - 125 mg/dL    Calcium 8.6 8.5 - 10.5 mg/dL    BUN 37 (H) 8 - 28 mg/dL    Creatinine 1.81 (H) 0.70 - 1.30 mg/dL    GFR MDRD Af Amer 44 (L) >60 mL/min/1.73m2    GFR MDRD Non Af Amer 36 (L) >60 mL/min/1.73m2           Xr Chest Pa And Lateral  Result Date: 7/21/2017  XR CHEST PA AND LATERAL 7/21/2017 2:49 PM INDICATION: Aspiration COMPARISON: 7/8/2015 FINDINGS: Lung volumes are low with crowding of the pulmonary markings. No definite infiltrate or pleural effusion.    Assessment/Plan:    1.  HTN: Borderline, stable on metoprolol 25mg BID and norvasc 10mg daily, allowed some permissive hypertension.  2.  Last Cr 1.81 with GFR 36, declined in fx  3.  DM: today dc glipizide per worsening renal fx, previously dc'd lantus per hypoglycemia, continue SS three times  a day with meals and continue novolog 10U at noon and increase to 8U at PM, hold for BS >140, last A1c of 6.9 (improved).  Will recheck A1c in dec.  4.  Vit D def: continue D3 2000U daily, last vit D 34.1 on 12/2017.  5.  Dementia: No behaviors, stable.  6.  Dysphagia: Continues on mech soft and regular consistency, no change.      The care plan has been reviewed and all orders signed. Changes to care plan, if any, as noted. Otherwise, continue care plan of care.     Electronically signed by: Aldo Solano NP

## 2021-06-21 NOTE — PROGRESS NOTES
[facility]      Code Status:  FULL CODE  Visit Type: Problem Visit     Facility:  [facility] [[id number]]        -2    History of Present Illness: Mo Swift is a 83 y.o. male who is currently admitted to LT on the memory care unit.  He was initially admitted to the TCU as a transfer from the hospital.  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  He was noted to have an aspiration event episode while at the  center.  He underwent a Heimlich maneuver and he was then sent to the emergency room.  His wife who also helps him as his caregiver was in the TCU herself during the same time.  His children were unable to meet his needs and they recommended placement in the TCU for him also for PT OT strengthening and rehab.  He did have a bedside swallow eval with no aspiration event noted and an x-ray chest was negative. He was subsequently discharged on his current medication regimen.      While in the TCU he did quite well with mood and behaviors remain stable though he tested extremely poorly cognitively indicating moderate to severe dementia.  His initial testing slums was 1/30 and a repeat CPT was 3.3. Currently he ambulates using a 4 wheeled walker but remains a high fall risk and 24 hour supervision. Recently per nursing he has been ambulating less though, mostly wc bound.  BS more controlled after initiating basaglar insulin, though BS were consistently 70s in am, lantus discontinued. Previously glipizide was discontinued per worsening renal fx.  Has higher BS at dinner, now will increase coverage again.  Intent to just use bolus coverage. Today called to evaluate hyperglycemia and new UTI.    Patient denies pain, headache, chest pain, numbness or tingling, shortest of breath,  eating or swallowing concerns, nausea or vomiting, diarrhea or bowel abnormalities, or no new integumentary concerns today.    Past Medical History:   Diagnosis Date     Alzheimer's dementia      ASCVD (arteriosclerotic cardiovascular disease)      CAD (coronary artery disease)      Chronic diarrhea      CKD (chronic kidney disease) stage 3, GFR 30-59 ml/min      CVA (cerebral vascular accident)     , POST REPAIR OF LEFT HIP FRACTURE, RESIDUAL LEFT SIDE WEAKNESS       DM type 2 (diabetes mellitus, type 2)      Falls 2016    with L side pain     Former smoker      Full code status 2017     Glaucoma      HLD (hyperlipidemia)      HTN (hypertension)      Legally blind      Macular degeneration      Microalbuminuria      PN (peripheral neuropathy)      Pubic bone fracture (H) 2016    L     Stenosis of right carotid artery     Right 75%       Past Surgical History:   Procedure Laterality Date     APPENDECTOMY       CAROTID ENDARTERECTOMY Right      CHOLECYSTECTOMY      Early .  Biopsy of liver.     shock tx      50 years ago fo paranoid schizophrenia     TOTAL HIP ARTHROPLASTY Left      Family History   Problem Relation Age of Onset     Dementia Mother         In stolen car.  Left out (literally) to freeze to death at 88 years.     Kidney disease Father          of kidney disease.     Other Brother         Only recently learned that he existed.  Health is unknown.     Social History     Socioeconomic History     Marital status:      Spouse name: [place]     Number of children: Not on file     Years of education: Not on file     Highest education level: Not on file   Social Needs     Financial resource strain: Not on file     Food insecurity - worry: Not on file     Food insecurity - inability: Not on file     Transportation needs - medical: Not on file     Transportation needs - non-medical: Not on file   Occupational History     Employer: RETIRED   Tobacco Use     Smoking status:  Former Smoker     Last attempt to quit: 1987     Years since quittin.4     Smokeless tobacco: Former User     Tobacco comment: Oral tobacco only briefly.  Smoked 34 years.   Substance and Sexual Activity     Alcohol use: No     Comment: He has a history of issues with alcohol.     Drug use: No     Sexual activity: Not on file   Other Topics Concern     Not on file   Social History Narrative    Patient of Dr. Vela since 2016.         Used to work in the [employer].        Uses a walker.  Goes to day program at [facility] for dementia.     Current Outpatient Medications   Medication Sig Dispense Refill     amLODIPine (NORVASC) 10 MG tablet Take 10 mg by mouth daily.       cholecalciferol, vitamin D3, 1,000 unit tablet Take 2,000 Units by mouth daily.       insulin aspart U-100 (NOVOLOG) 100 unit/mL injection Inject under the skin 3 (three) times a day before meals. <200                0U    201-249         2U    250-299         4U    300-349         6U    350-399         8U    400-450        10U    451<             12U       insulin aspart U-100 (NOVOLOG) 100 unit/mL injection Inject 10 Units under the skin daily with supper Hold for  or if not eating >50% of meal .             insulin aspart U-100 (NOVOLOG) 100 unit/mL injection Inject 10 Units under the skin daily with lunch.        metoprolol tartrate (LOPRESSOR) 25 MG tablet Take 25 mg by mouth 2 (two) times a day.       ondansetron (ZOFRAN) 4 MG tablet Take 4 mg by mouth every 6 (six) hours as needed for nausea.       sulfamethoxazole-trimethoprim (SEPTRA DS) 800-160 mg per tablet Take 1 tablet by mouth 2 (two) times a day (for 5 days for UTI) .       No current facility-administered medications for this visit.      No Known Allergies    Review of Systems:    Constitutional: Negative.  Negative for fever, chills, has activity change, appetite change and fatigue. Denies pain. High BSs recorded recently.  HENT: Negative for  congestion and facial swelling.    Eyes: Negative for photophobia, redness and visual disturbance.   Respiratory: Negative for cough and chest tightness.    Cardiovascular: Negative for chest pain, palpitations and leg swelling.   Gastrointestinal: Negative for nausea, diarrhea, constipation, blood in stool and abdominal distention.   Genitourinary: Negative.    Musculoskeletal: Negative.    Skin: Negative.  Intact.   Neurological: Negative for dizziness, tremors, syncope, weakness, light-headedness and headaches.   Hematological: Does not bruise/bleed easily.   Psychiatric/Behavioral: Negative.  confused at baseline. Stable.    Vitals:    11/13/18 1832   BP: 123/74   Pulse: 79   Resp: 18   Temp: 97  F (36.1  C)   SpO2: 92%       Physical Exam:    GENERAL: no acute distress. Cooperative.  Denies pain. Found to have UTI.  HEENT: pupils are equal, round and reactive. Oral mucosa is moist and intact. Patient has some hearing loss and is legally blind.  RESP:Chest symmetric. Regular respiratory rate. No stridor.  CVS: RRR, S1S2, no murmur, rub, or gallop.  ABD: Nondistended, soft. No constipation or diarrhea.  EXTREMITIES: No lower extremity edema.  Left-sided weakness.  More bed bound and uses wc occasionally.   NEURO: no focal deficits. Alert and oriented x2. Confused with a very poor recall.  Declining.   PSYCH: within normal limits. No depression or anxiety.  SKIN: warm dry intact       Labs:    Recent Results (from the past 240 hour(s))   HM1 (CBC with Diff)   Result Value Ref Range    WBC 10.6 4.0 - 11.0 thou/uL    RBC 3.19 (L) 4.40 - 6.20 mill/uL    Hemoglobin 10.1 (L) 14.0 - 18.0 g/dL    Hematocrit 31.8 (L) 40.0 - 54.0 %     80 - 100 fL    MCH 31.7 27.0 - 34.0 pg    MCHC 31.8 (L) 32.0 - 36.0 g/dL    RDW 13.5 11.0 - 14.5 %    Platelets 252 140 - 440 thou/uL    MPV 10.1 8.5 - 12.5 fL    Neutrophils % 83 (H) 50 - 70 %    Lymphocytes % 6 (L) 20 - 40 %    Monocytes % 10 2 - 10 %    Eosinophils % 1 0 - 6 %     Basophils % 0 0 - 2 %    Neutrophils Absolute 8.7 (H) 2.0 - 7.7 thou/uL    Lymphocytes Absolute 0.7 (L) 0.8 - 4.4 thou/uL    Monocytes Absolute 1.0 (H) 0.0 - 0.9 thou/uL    Eosinophils Absolute 0.1 0.0 - 0.4 thou/uL    Basophils Absolute 0.0 0.0 - 0.2 thou/uL   Urinalysis   Result Value Ref Range    Color, UA Yellow Colorless, Yellow, Straw, Light Yellow    Clarity, UA Turbid (!) Clear    Glucose, UA 30 mg/dL (!) Negative    Bilirubin, UA Negative Negative    Ketones, UA Negative Negative    Specific Gravity, UA 1.019 1.001 - 1.030    Blood, UA Small (!) Negative    pH, UA 5.5 4.5 - 8.0    Protein,  mg/dL (!) Negative mg/dL    Urobilinogen, UA <2.0 E.U./dL <2.0 E.U./dL, 2.0 E.U./dL    Nitrite, UA Positive (!) Negative    Leukocytes, UA Large (!) Negative    Bacteria, UA Many (!) None Seen hpf    RBC, UA 3-5 (!) None Seen, 0-2 hpf    WBC, UA >100 (!) None Seen, 0-5 hpf    Squam Epithel, UA 0-5 None Seen, 0-5 lpf    WBC Clumps Present (!) None Seen    Trans Epithel, UA 0-5 (!) None Seen lpf     Lab Results   Component Value Date    HGBA1C 6.9 (H) 05/14/2018     Results for orders placed or performed in visit on 05/14/18   Basic Metabolic Panel   Result Value Ref Range    Sodium 138 136 - 145 mmol/L    Potassium 4.4 3.5 - 5.0 mmol/L    Chloride 105 98 - 107 mmol/L    CO2 25 22 - 31 mmol/L    Anion Gap, Calculation 8 5 - 18 mmol/L    Glucose 143 (H) 70 - 125 mg/dL    Calcium 8.6 8.5 - 10.5 mg/dL    BUN 37 (H) 8 - 28 mg/dL    Creatinine 1.81 (H) 0.70 - 1.30 mg/dL    GFR MDRD Af Amer 44 (L) >60 mL/min/1.73m2    GFR MDRD Non Af Amer 36 (L) >60 mL/min/1.73m2           Xr Chest Pa And Lateral  Result Date: 7/21/2017  XR CHEST PA AND LATERAL 7/21/2017 2:49 PM INDICATION: Aspiration COMPARISON: 7/8/2015 FINDINGS: Lung volumes are low with crowding of the pulmonary markings. No definite infiltrate or pleural effusion.    Assessment/Plan:    1.  HTN: Borderline, stable on metoprolol 25mg BID and norvasc 10mg daily, allowed  some permissive hypertension.  2.  Last Cr 1.81 with GFR 36, declined in fx  3.  DM: today dc glipizide per worsening renal fx, previously dc'd lantus per hypoglycemia, continue SS three times a day with meals and continue novolog 10U at noon and increase to 10U at PM, hold for BS >140, last A1c of 6.9 (improved).  Will recheck A1c in dec.  4.  Vit D def: continue D3 2000U daily, last vit D 34.1 on 12/2017.  5.  Dementia: No behaviors, stable.  6.  Dysphagia: Continues on mech soft and regular consistency, no change.  7.  Acute hyperglycemia: + recently requiring several 10+ units throughout the previous weekend.   8.  UTI: found per acute hyperglycemia, started on Bactrim DS, though now having emesis, will start rocephin 1 GM IM now as well. Vitals stable.       The care plan has been reviewed and all orders signed. Changes to care plan, if any, as noted. Otherwise, continue care plan of care.  The total time spent with this patient was greater than 50 minutes, with greater than 50% spent in counseling and coordination of care that included multiple issues per acute hyperglycemia and new UTI.       Electronically signed by: Aldo Solano NP

## 2021-06-22 NOTE — TELEPHONE ENCOUNTER
This patient's medication list and chart were reviewed as part of the service provided by [facility].    Assessment/Recommendations:    No recommendations for changes at this time.  Note HgA1c goal <8.5% reasonable for this elderly patient with dementia, multiple co-morbidities,  limited life expectancy.    Nilda Samuel, Pharm.D.,[facility]  Board Certified Geriatric Pharmacist  Medication Therapy Management Pharmacist  [phone]

## 2021-06-22 NOTE — PROGRESS NOTES
[facility]      Code Status:  FULL CODE  Visit Type: Review Of Multiple Medical Conditions     Facility:  [facility] [[id number]]            History of Present Illness: Mo Swift is a 84 y.o. male who is currently admitted to [facility] on the memory care unit.  He was initially admitted to the TCU as a transfer from the hospital.  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  He was noted to have an aspiration event episode while at the  center.  He underwent a Heimlich maneuver and he was then sent to the emergency room.  His wife who also helps him as his caregiver was in the TCU herself during the same time.  His children were unable to meet his needs and they recommended placement in the TCU for him also for PT OT strengthening and rehab.  He did have a bedside swallow eval with no aspiration event noted and an x-ray chest was negative. He was subsequently discharged on his current medication regimen.      While in the TCU he did quite well with mood and behaviors remain stable though he tested extremely poorly cognitively indicating moderate to severe dementia.  His initial testing slums was 1/30 and a repeat CPT was 3.3. Currently he ambulates using a 4 wheeled walker but remains a high fall risk and 24 hour supervision. Recently per nursing he has been ambulating less though, mostly wc bound.  BS were more controlled after initiating basaglar insulin, though BS were consistently 70s in am, lantus discontinued. Previously glipizide was discontinued per worsening renal fx.  Has higher BS at dinner, now frequently 80s at supper time, will decrease bolus dosing.  Intent to just use bolus coverage.     Patient denies pain, headache, chest pain, numbness or tingling, shortest of  breath, eating or swallowing concerns, nausea or vomiting, diarrhea or bowel abnormalities, or no new integumentary concerns today.    Past Medical History:   Diagnosis Date     Alzheimer's dementia      ASCVD (arteriosclerotic cardiovascular disease)      CAD (coronary artery disease)      Chronic diarrhea      CKD (chronic kidney disease) stage 3, GFR 30-59 ml/min      CVA (cerebral vascular accident)     , POST REPAIR OF LEFT HIP FRACTURE, RESIDUAL LEFT SIDE WEAKNESS       DM type 2 (diabetes mellitus, type 2)      Falls 2016    with L side pain     Former smoker      Full code status 2017     Glaucoma      HLD (hyperlipidemia)      HTN (hypertension)      Legally blind      Macular degeneration      Microalbuminuria      PN (peripheral neuropathy)      Pubic bone fracture (H) 2016    L     Stenosis of right carotid artery     Right 75%       Past Surgical History:   Procedure Laterality Date     APPENDECTOMY       CAROTID ENDARTERECTOMY Right      CHOLECYSTECTOMY      Early .  Biopsy of liver.     shock tx      50 years ago fo paranoid schizophrenia     TOTAL HIP ARTHROPLASTY Left      Family History   Problem Relation Age of Onset     Dementia Mother         In stolen car.  Left out (literally) to freeze to death at 88 years.     Kidney disease Father          of kidney disease.     Other Brother         Only recently learned that he existed.  Health is unknown.     Social History     Socioeconomic History     Marital status:      Spouse name: [place]     Number of children: Not on file     Years of education: Not on file     Highest education level: Not on file   Social Needs     Financial resource strain: Not on file     Food insecurity - worry: Not on file     Food insecurity - inability: Not on file     Transportation needs - medical: Not on file     Transportation needs - non-medical: Not on file   Occupational History     Employer: RETIRED   Tobacco Use     Smoking  status: Former Smoker     Last attempt to quit: 1987     Years since quittin.5     Smokeless tobacco: Former User     Tobacco comment: Oral tobacco only briefly.  Smoked 34 years.   Substance and Sexual Activity     Alcohol use: No     Comment: He has a history of issues with alcohol.     Drug use: No     Sexual activity: Not on file   Other Topics Concern     Not on file   Social History Narrative    Patient of Dr. Vela since 2016.         Used to work in the [employer].        Uses a walker.  Goes to day program at [facility] for dementia.     Current Outpatient Medications   Medication Sig Dispense Refill     amLODIPine (NORVASC) 10 MG tablet Take 10 mg by mouth daily.       cholecalciferol, vitamin D3, 1,000 unit tablet Take 2,000 Units by mouth daily.       insulin aspart U-100 (NOVOLOG) 100 unit/mL injection Inject under the skin 3 (three) times a day before meals. <200                0U    201-249         2U    250-299         4U    300-349         6U    350-399         8U    400-450        10U    451<             12U       insulin aspart U-100 (NOVOLOG) 100 unit/mL injection Inject 10 Units under the skin daily with supper Hold for  or if not eating >50% of meal .             insulin aspart U-100 (NOVOLOG) 100 unit/mL injection Inject 8 Units under the skin daily with lunch.              metoprolol tartrate (LOPRESSOR) 25 MG tablet Take 25 mg by mouth 2 (two) times a day.       ondansetron (ZOFRAN) 4 MG tablet Take 4 mg by mouth every 6 (six) hours as needed for nausea.       No current facility-administered medications for this visit.      No Known Allergies    Review of Systems:    Constitutional: Negative.  Negative for fever, chills, has activity change, appetite change and fatigue. Denies pain. BSs variable.  HENT: Negative for congestion and facial swelling.    Eyes: Negative for photophobia, redness and visual disturbance.   Respiratory: Negative for cough and  chest tightness.    Cardiovascular: Negative for chest pain, palpitations and leg swelling.   Gastrointestinal: Negative for nausea, diarrhea, constipation, blood in stool and abdominal distention.   Genitourinary: Negative.    Musculoskeletal: Negative.    Skin: Negative.  Intact.   Neurological: Negative for dizziness, tremors, syncope, weakness, light-headedness and headaches.   Hematological: Does not bruise/bleed easily.   Psychiatric/Behavioral: Negative.  confused at baseline. Stable.    Vitals:    12/22/18 1418   BP: 136/74   Pulse: 60   Resp: 20   Temp: 97  F (36.1  C)   SpO2: 96%       Physical Exam:    GENERAL: no acute distress. Cooperative.  Denies pain.   HEENT: pupils are equal, round and reactive. Oral mucosa is moist and intact. Patient has some hearing loss and is legally blind.  RESP:Chest symmetric. Regular respiratory rate. No stridor.  CVS: RRR, S1S2, no murmur, rub, or gallop.  ABD: Nondistended, soft. No constipation or diarrhea.  EXTREMITIES: No lower extremity edema.  Left-sided weakness.  More bed bound and uses wc occasionally.   NEURO: no focal deficits. Alert and oriented x1-2. Confused with a very poor recall.  Declining.   PSYCH: within normal limits. No depression or anxiety.  SKIN: warm dry intact       Labs:    No results found for this or any previous visit (from the past 240 hour(s)).  Lab Results   Component Value Date    HGBA1C 6.9 (H) 05/14/2018     Results for orders placed or performed in visit on 05/14/18   Basic Metabolic Panel   Result Value Ref Range    Sodium 138 136 - 145 mmol/L    Potassium 4.4 3.5 - 5.0 mmol/L    Chloride 105 98 - 107 mmol/L    CO2 25 22 - 31 mmol/L    Anion Gap, Calculation 8 5 - 18 mmol/L    Glucose 143 (H) 70 - 125 mg/dL    Calcium 8.6 8.5 - 10.5 mg/dL    BUN 37 (H) 8 - 28 mg/dL    Creatinine 1.81 (H) 0.70 - 1.30 mg/dL    GFR MDRD Af Amer 44 (L) >60 mL/min/1.73m2    GFR MDRD Non Af Amer 36 (L) >60 mL/min/1.73m2           Xr Chest Pa And  Lateral  Result Date: 7/21/2017  XR CHEST PA AND LATERAL 7/21/2017 2:49 PM INDICATION: Aspiration COMPARISON: 7/8/2015 FINDINGS: Lung volumes are low with crowding of the pulmonary markings. No definite infiltrate or pleural effusion.    Assessment/Plan:    1.  HTN: Borderline, stable on metoprolol 25mg BID and norvasc 10mg daily, allowed some permissive hypertension.  2.  Last Cr 1.81 with GFR 36, declined in fx  3.  DM: today dc glipizide per worsening renal fx, previously dc'd lantus per hypoglycemia, continue SS three times a day with meals and decrease novolog to 8U at noon and continue 10U at PM, hold for BS >140, last A1c of 6.9 (improved).  Will recheck A1c this month.  4.  Vit D def: continue D3 2000U daily, last vit D 34.1 on 12/2017.  5.  Dementia: No behaviors, stable.  6.  Dysphagia: Continues on mech soft and regular consistency, no change.  7.  Acute hyperglycemia: less BSs in 400s,   8.  UTI: found per acute hyperglycemia, started on Bactrim DS, though now having emesis, will start rocephin 1 GM IM now as well. Vitals stable.       The care plan has been reviewed and all orders signed. Changes to care plan, if any, as noted. Otherwise, continue care plan of care.  The total time spent with this patient was 30 minutes, with greater than 50% spent in counseling and coordination of care that included multiple issues per ongoing DM.     Electronically signed by: Aldo Solano NP

## 2021-06-23 NOTE — PROGRESS NOTES
[facility]      Code Status:  FULL CODE  Visit Type: Review Of Multiple Medical Conditions     Facility:  [facility] [[id number]]           History of Present Illness: Mo Swift is a 84 y.o. male who is currently a resident of [facility].    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  His initial testing slums was 1/30 and a repeat CPT was 3.3/ 5.6.  His current BMs is 3  To ambulate using a 4 wheeled walker but remains a high fall risk .  He recently had a fall but no injuries  He generally remains WC bound and does not do much-sleeps all day  No change in mood and behavior   He is a diabetic and his last A1c is 8.  Blood sugar log was reviewed and he is higher in the postprandial range especially at lunch when he is running around 350+.  He is being fed during this time he also remains on Ensure supplementation  Blood pressures were reviewed and they have been running low.  He has gained greater than 10 pounds of weight recently  Past Medical History:   Diagnosis Date     Alzheimer's dementia      ASCVD (arteriosclerotic cardiovascular disease)      CAD (coronary artery disease)      Chronic diarrhea      CKD (chronic kidney disease) stage 3, GFR 30-59 ml/min      CVA (cerebral vascular accident)     2010, POST REPAIR OF LEFT HIP FRACTURE, RESIDUAL LEFT SIDE WEAKNESS       DM type 2 (diabetes mellitus, type 2)      Falls 06/02/2016    with L side pain     Former smoker      Full code status 1/22/2017     Glaucoma      HLD (hyperlipidemia)      HTN (hypertension)      Legally blind      Macular degeneration      Microalbuminuria      PN (peripheral neuropathy)      Pubic bone fracture (H) 06/02/2016    L     Stenosis of  right carotid artery     Right 75%       Past Surgical History:   Procedure Laterality Date     APPENDECTOMY       CAROTID ENDARTERECTOMY Right      CHOLECYSTECTOMY      Early s.  Biopsy of liver.     shock tx      50 years ago fo paranoid schizophrenia     TOTAL HIP ARTHROPLASTY Left      Family History   Problem Relation Age of Onset     Dementia Mother         In stolen car.  Left out (literally) to freeze to death at 88 years.     Kidney disease Father          of kidney disease.     Other Brother         Only recently learned that he existed.  Health is unknown.     Social History     Socioeconomic History     Marital status:      Spouse name: Virginia     Number of children: Not on file     Years of education: Not on file     Highest education level: Not on file   Social Needs     Financial resource strain: Not on file     Food insecurity - worry: Not on file     Food insecurity - inability: Not on file     Transportation needs - medical: Not on file     Transportation needs - non-medical: Not on file   Occupational History     Employer: RETIRED   Tobacco Use     Smoking status: Former Smoker     Last attempt to quit: 1987     Years since quittin.6     Smokeless tobacco: Former User     Tobacco comment: Oral tobacco only briefly.  Smoked 34 years.   Substance and Sexual Activity     Alcohol use: No     Comment: He has a history of issues with alcohol.     Drug use: No     Sexual activity: Not on file   Other Topics Concern     Not on file   Social History Narrative    Patient of Dr. Vela since 2016.         Used to work in the [employer].        Uses a walker.  Goes to day program at [facility] for dementia.     Current Outpatient Medications   Medication Sig Dispense Refill     amLODIPine (NORVASC) 10 MG tablet Take 10 mg by mouth daily.       cholecalciferol, vitamin D3, 1,000 unit tablet Take 2,000 Units by mouth daily.       insulin aspart U-100 (NOVOLOG)  100 unit/mL injection Inject under the skin 3 (three) times a day with meals. <200                0U    201-249         2U    250-299         4U    300-349         6U    350-399         8U    400-450        10U    451<             12U              insulin aspart U-100 (NOVOLOG) 100 unit/mL injection Inject 10 Units under the skin daily with supper Hold for  or if not eating >50% of meal .             insulin aspart U-100 (NOVOLOG) 100 unit/mL injection Inject 8 Units under the skin daily with lunch.              metoprolol tartrate (LOPRESSOR) 25 MG tablet Take 25 mg by mouth 2 (two) times a day.       No current facility-administered medications for this visit.      No Known Allergies      Review of Systems:    Constitutional: Negative.  Negative for fever, chills, has activity change, appetite change and fatigue.   HENT: Negative for congestion and facial swelling.    Eyes: Negative for photophobia, redness and visual disturbance.   Respiratory: Negative for cough and chest tightness.    Cardiovascular: Negative for chest pain, palpitations and leg swelling.   Gastrointestinal: Negative for nausea, diarrhea, constipation, blood in stool and abdominal distention.   Genitourinary: Negative.    Musculoskeletal: Negative.    Has a fall recently reported by nursing also had ecchymosis of his wrist  Skin: Negative.    Neurological: Negative for dizziness, tremors, syncope, weakness, light-headedness and headaches.   Hematological: Does not bruise/bleed easily.   Psychiatric/Behavioral: Negative.  confused    Physical Exam:    Pressure 92/48 recheck was 105/65 temp 98 pulse 72 weight is 152 pounds up by 10 pounds from his last weight  GENERAL: no acute distress. Cooperative in conversation.   HEENT: pupils are equal, round and reactive. Oral mucosa is moist and intact.  Patient has some hearing loss and is legally blind  RESP:Chest symmetric. Regular respiratory rate. No stridor.  CVS: S1S2  ABD: Nondistended,  soft.  EXTREMITIES: No lower extremity edema.  Left-sided weakness with some spasticity of his hand noted uses a walker for ambulation  NEURO: non focal. Alert and oriented xself. Confused with a very poor recall does not follow any commands any longer  PSYCH: within normal limits. No depression or anxiety.  SKIN: warm dry intact     Labs:      Lab Results   Component Value Date    HGBA1C 8.0 (H) 12/26/2018     Results for orders placed or performed in visit on 05/14/18   Basic Metabolic Panel   Result Value Ref Range    Sodium 138 136 - 145 mmol/L    Potassium 4.4 3.5 - 5.0 mmol/L    Chloride 105 98 - 107 mmol/L    CO2 25 22 - 31 mmol/L    Anion Gap, Calculation 8 5 - 18 mmol/L    Glucose 143 (H) 70 - 125 mg/dL    Calcium 8.6 8.5 - 10.5 mg/dL    BUN 37 (H) 8 - 28 mg/dL    Creatinine 1.81 (H) 0.70 - 1.30 mg/dL    GFR MDRD Af Amer 44 (L) >60 mL/min/1.73m2    GFR MDRD Non Af Amer 36 (L) >60 mL/min/1.73m2       .  Assessment/Plan:    Alzheimer's dementia with initial testing slums of 1/30; CPT of 3.3/5.6 BIMS 3/15  Status post V CVA with left lower extremity weakness chronic  Diabetes type 2-uncontrolled with recent worsening all postprandial blood sugars are more than 350  Chronic kidney disease  Legal blindness  Dysphagia  Debilitation in a patient who remains a very high fall risk in light of his visual impairment and cognitive impairments  Hyperlipidemia o  Hypertension with hypotension in this elderly gentleman  Weight gain of 10 pounds  Patient is in long-term care with no significant behavioral issues noted   recent falls but no injury. most of the time he tends to sleep and remains wheelchair-bound.  He is ambulating less and sleeping more and   Diabetic management was reviewed both with nursing as well as podiatry in the presence of patient it seems he is on supplements as well as being fed at mealtimes  Plan is to discontinue his supplementation due to significant weight gain of 10 pounds  In the meantime  start glipizide 5 mg in the morning with breakfast  He has dysphagia and all his pills are being crushed  Monitor blood pressures closely we will discontinue amlodipine and continue with his metoprolol for now  Care plan also reviewed with dietary his BMI is more than 27.  I think he is okay at his current weight and does not need any supplementation.  Since he is being fed he has gained some weight so I think we should not be giving any supplements which are causing him a general disservice  Electronically signed by: ADELINE Baptiste  This progress note was completed using Dragon software and there may be grammatical errors.

## 2021-06-24 NOTE — PROGRESS NOTES
[facility]      Code Status:  FULL CODE  Visit Type: Review Of Multiple Medical Conditions     Facility:  [facility] [[id number]]            History of Present Illness: Mo Swift is a 84 y.o. male who is currently admitted to LT on the memory care unit.  He was initially admitted to the TCU as a transfer from the hospital.  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  He was noted to have an aspiration event episode while at the  center.  He underwent a Heimlich maneuver and he was then sent to the emergency room.  His wife who also helps him as his caregiver was in the TCU herself during the same time.  His children were unable to meet his needs and they recommended placement in the TCU for him also for PT OT strengthening and rehab.  He did have a bedside swallow eval with no aspiration event noted and an x-ray chest was negative. He was subsequently discharged on his current medication regimen.      While in the TCU he did quite well with mood and behaviors remain stable though he tested extremely poorly cognitively indicating moderate to severe dementia.  His initial testing slums was 1/30 and a repeat CPT was 3.3. Currently he ambulates using a 4 wheeled walker but remains a high fall risk and 24 hour supervision. Recently per nursing he has been ambulating less though, mostly wc bound.  BS were more controlled after initiating basaglar insulin, though BS were consistently 70s in am, lantus discontinued. Previously glipizide was discontinued per worsening renal fx, though it has been restarted without renal fx recheck. BSs variable, frequently being held at noon and PM time.    Patient denies pain, headache, chest pain, numbness or tingling, shortest of breath, eating or  swallowing concerns, nausea or vomiting, diarrhea or bowel abnormalities, or no new integumentary concerns today.    Past Medical History:   Diagnosis Date     Alzheimer's dementia      ASCVD (arteriosclerotic cardiovascular disease)      CAD (coronary artery disease)      Chronic diarrhea      CKD (chronic kidney disease) stage 3, GFR 30-59 ml/min      CVA (cerebral vascular accident)     , POST REPAIR OF LEFT HIP FRACTURE, RESIDUAL LEFT SIDE WEAKNESS       DM type 2 (diabetes mellitus, type 2)      Falls 2016    with L side pain     Former smoker      Full code status 2017     Glaucoma      HLD (hyperlipidemia)      HTN (hypertension)      Legally blind      Macular degeneration      Microalbuminuria      PN (peripheral neuropathy)      Pubic bone fracture (H) 2016    L     Stenosis of right carotid artery     Right 75%       Past Surgical History:   Procedure Laterality Date     APPENDECTOMY       CAROTID ENDARTERECTOMY Right      CHOLECYSTECTOMY      Early .  Biopsy of liver.     shock tx      50 years ago fo paranoid schizophrenia     TOTAL HIP ARTHROPLASTY Left      Family History   Problem Relation Age of Onset     Dementia Mother         In stolen car.  Left out (literally) to freeze to death at 88 years.     Kidney disease Father          of kidney disease.     Other Brother         Only recently learned that he existed.  Health is unknown.     Social History     Socioeconomic History     Marital status:      Spouse name: [place]     Number of children: Not on file     Years of education: Not on file     Highest education level: Not on file   Occupational History     Employer: RETIRED   Social Needs     Financial resource strain: Not on file     Food insecurity:     Worry: Not on file     Inability: Not on file     Transportation needs:     Medical: Not on file     Non-medical: Not on file   Tobacco Use     Smoking status: Former Smoker     Last attempt to quit:  1987     Years since quittin.7     Smokeless tobacco: Former User     Tobacco comment: Oral tobacco only briefly.  Smoked 34 years.   Substance and Sexual Activity     Alcohol use: No     Comment: He has a history of issues with alcohol.     Drug use: No     Sexual activity: Not on file   Lifestyle     Physical activity:     Days per week: Not on file     Minutes per session: Not on file     Stress: Not on file   Relationships     Social connections:     Talks on phone: Not on file     Gets together: Not on file     Attends Hinduism service: Not on file     Active member of club or organization: Not on file     Attends meetings of clubs or organizations: Not on file     Relationship status: Not on file     Intimate partner violence:     Fear of current or ex partner: Not on file     Emotionally abused: Not on file     Physically abused: Not on file     Forced sexual activity: Not on file   Other Topics Concern     Not on file   Social History Narrative    Patient of Dr. Vela since 2016.         Used to work in the [employer].        Uses a walker.  Goes to day program at [facility] for dementia.     Current Outpatient Medications   Medication Sig Dispense Refill     glipiZIDE (GLUCOTROL XL) 5 MG 24 hr tablet Take 5 mg by mouth daily with breakfast.       cholecalciferol, vitamin D3, 1,000 unit tablet Take 2,000 Units by mouth daily.       insulin aspart U-100 (NOVOLOG) 100 unit/mL injection Inject under the skin 3 (three) times a day with meals. <200                0U    201-249         2U    250-299         4U    300-349         6U    350-399         8U    400-450        10U    451<             12U              insulin aspart U-100 (NOVOLOG) 100 unit/mL injection Inject 4 Units under the skin daily with supper. Hold for  or if not eating >50% of meal              metoprolol tartrate (LOPRESSOR) 25 MG tablet Take 12.5 mg by mouth 2 (two) times a day. Hold for SBP <140 or HR  <60             No current facility-administered medications for this visit.      No Known Allergies    Review of Systems:    Constitutional: Negative.  Negative for fever, chills, has activity change, appetite change and fatigue. Denies pain. BSs variable.  HENT: Negative for congestion and facial swelling.    Eyes: Negative for photophobia, redness and visual disturbance.   Respiratory: Negative for cough and chest tightness.    Cardiovascular: Negative for chest pain, palpitations and leg swelling.   Gastrointestinal: Negative for nausea, diarrhea, constipation, blood in stool and abdominal distention.   Genitourinary: Negative.    Musculoskeletal: Negative.    Skin: Negative.  Intact.   Neurological: Negative for dizziness, tremors, syncope, weakness, light-headedness and headaches.   Hematological: Does not bruise/bleed easily.   Psychiatric/Behavioral: Negative.  confused at baseline. Stable.    Vitals:    02/21/19 1744   BP: 118/70   Pulse: 62   Resp: 16   Temp: 98  F (36.7  C)   SpO2: 96%       Physical Exam:    GENERAL: no acute distress. Cooperative.  Denies pain.   HEENT: pupils are equal, round and reactive. Oral mucosa is moist and intact. Patient has some hearing loss and is legally blind.  RESP:Chest symmetric. Regular respiratory rate. No stridor.  Dim, RA.  CVS: RRR, S1S2, no murmur, rub, or gallop.  ABD: Nondistended, soft. No constipation or diarrhea.  EXTREMITIES: No lower extremity edema.  Left-sided weakness.  More bed bound and uses wc occasionally.   NEURO: no focal deficits. Alert and oriented x1-2. Confused with a very poor recall.  Declining.   PSYCH: within normal limits. No depression or anxiety.  SKIN: warm dry intact       Labs:    Recent Results (from the past 240 hour(s))   Norovirus Group I and II Detection by RT-PCR   Result Value Ref Range    Norovirus I and II by LEIGH Not Detected NDET    Enteric Pathogen Comment       Testing performed by multiplexed, qualitative PCR using the  [employer]   Urinalysis   Result Value Ref Range    Color, UA Yellow Colorless, Yellow, Straw, Light Yellow    Clarity, UA Turbid (!) Clear    Glucose, UA Negative Negative    Bilirubin, UA Negative Negative    Ketones, UA Negative Negative    Specific Gravity, UA 1.033 (H) 1.001 - 1.030    Blood, UA Small (!) Negative    pH, UA 6.0 4.5 - 8.0    Protein,  mg/dL (!) Negative mg/dL    Urobilinogen, UA 2.0 E.U./dL <2.0 E.U./dL, 2.0 E.U./dL    Nitrite, UA Negative Negative    Leukocytes, UA Large (!) Negative    Bacteria, UA Moderate (!) None Seen hpf    RBC, UA 5-10 (!) None Seen, 0-2 hpf    WBC, UA >100 (!) None Seen, 0-5 hpf    Squam Epithel, UA  (!) None Seen, 0-5 lpf    WBC Clumps Present (!) None Seen    Amorphous, UA Many (!) None Seen    Mucus, UA Few (!) None Seen lpf   Culture, Urine   Result Value Ref Range    Culture >100,000 col/ml Escherichia coli (!)      Lab Results   Component Value Date    HGBA1C 8.0 (H) 12/26/2018     Results for orders placed or performed in visit on 05/14/18   Basic Metabolic Panel   Result Value Ref Range    Sodium 138 136 - 145 mmol/L    Potassium 4.4 3.5 - 5.0 mmol/L    Chloride 105 98 - 107 mmol/L    CO2 25 22 - 31 mmol/L    Anion Gap, Calculation 8 5 - 18 mmol/L    Glucose 143 (H) 70 - 125 mg/dL    Calcium 8.6 8.5 - 10.5 mg/dL    BUN 37 (H) 8 - 28 mg/dL    Creatinine 1.81 (H) 0.70 - 1.30 mg/dL    GFR MDRD Af Amer 44 (L) >60 mL/min/1.73m2    GFR MDRD Non Af Amer 36 (L) >60 mL/min/1.73m2           Xr Chest Pa And Lateral  Result Date: 7/21/2017  XR CHEST PA AND LATERAL 7/21/2017 2:49 PM INDICATION: Aspiration COMPARISON: 7/8/2015 FINDINGS: Lung volumes are low with crowding of the pulmonary markings. No definite infiltrate or pleural effusion.    Assessment/Plan:    1.  HTN: now hypotensive, metoprolol decreased to 12.5mg BID and norvasc has been discontinued  2.  Last Cr 1.81 with GFR 36 on 5/14/18, declined in fx  3.  DM: recently glipizide has been  restarted though renal fx has not been rechecked, previously dc'd lantus per hypoglycemia, continue SS three times a day with meals and discontinue novolog at noon and decrease to 4U at PM, hold for BS >140, last A1c 8.0 on 12/26/18  4.  Vit D def: continue D3 2000U daily, last vit D 34.1 on 12/2017.  5.  Dementia: No behaviors, stable.  6.  Dysphagia: Continues on mech soft and regular consistency, no change.  7.  Acute hyperglycemia: rare for BS >300 now      The care plan has been reviewed and all orders signed. Changes to care plan, if any, as noted. Otherwise, continue care plan of care.  The total time spent with this patient was 35 minutes, with greater than 50% spent in counseling and coordination of care that included multiple issues per ongoing DM.     Electronically signed by: Aldo Solano NP

## 2021-06-24 NOTE — PROGRESS NOTES
[facility]      Code Status:  FULL CODE  Visit Type: Review Of Multiple Medical Conditions     Facility:  [facility] [[id number]]           History of Present Illness: Mo Swift is a 84 y.o. male who is currently a resident of [facility]    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  His initial testing slums was 1/30 and a repeat CPT was 3.3/ 5.6.  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  This has been a recurrent problem with him and he has been treated for UTIs but every time the cultures growing the same organism at present he is in isolation but a decision has been made not to treat him  He has lost 10 pounds in the interim due to poor oral intake.  Patient is a diabetic who was recently started on his medications due to high blood sugars.  His blood sugar record was reviewed and a.m. blood sugars and postprandials are around 140 except at lunchtime but he is being fed and blood sugars are consistently over 200  He is also on a low-dose of medication for hypertension  Overall is declining and wheelchair-bound and sleeping all day  Past Medical History:   Diagnosis Date     Alzheimer's dementia      ASCVD (arteriosclerotic cardiovascular disease)      CAD (coronary artery disease)      Chronic diarrhea      CKD (chronic kidney disease) stage 3, GFR 30-59 ml/min      CVA (cerebral vascular accident)     2010, POST REPAIR OF LEFT HIP FRACTURE, RESIDUAL LEFT SIDE WEAKNESS       DM type 2 (diabetes mellitus, type 2)      Falls 06/02/2016    with L side pain     Former smoker      Full code status 1/22/2017     Glaucoma      HLD (hyperlipidemia)      HTN (hypertension)      Legally blind      Macular  degeneration      Microalbuminuria      PN (peripheral neuropathy)      Pubic bone fracture (H) 2016    L     Stenosis of right carotid artery     Right 75%       Past Surgical History:   Procedure Laterality Date     APPENDECTOMY       CAROTID ENDARTERECTOMY Right      CHOLECYSTECTOMY      Early s.  Biopsy of liver.     shock tx      50 years ago fo paranoid schizophrenia     TOTAL HIP ARTHROPLASTY Left      Family History   Problem Relation Age of Onset     Dementia Mother         In stolen car.  Left out (literally) to freeze to death at 88 years.     Kidney disease Father          of kidney disease.     Other Brother         Only recently learned that he existed.  Health is unknown.     Social History     Socioeconomic History     Marital status:      Spouse name: Virginia     Number of children: Not on file     Years of education: Not on file     Highest education level: Not on file   Occupational History     Employer: RETIRED   Social Needs     Financial resource strain: Not on file     Food insecurity:     Worry: Not on file     Inability: Not on file     Transportation needs:     Medical: Not on file     Non-medical: Not on file   Tobacco Use     Smoking status: Former Smoker     Last attempt to quit: 1987     Years since quittin.7     Smokeless tobacco: Former User     Tobacco comment: Oral tobacco only briefly.  Smoked 34 years.   Substance and Sexual Activity     Alcohol use: No     Comment: He has a history of issues with alcohol.     Drug use: No     Sexual activity: Not on file   Lifestyle     Physical activity:     Days per week: Not on file     Minutes per session: Not on file     Stress: Not on file   Relationships     Social connections:     Talks on phone: Not on file     Gets together: Not on file     Attends [employer] service: Not on file     Active member of club or organization: Not on file     Attends meetings of clubs or organizations: Not on file      Relationship status: Not on file     Intimate partner violence:     Fear of current or ex partner: Not on file     Emotionally abused: Not on file     Physically abused: Not on file     Forced sexual activity: Not on file   Other Topics Concern     Not on file   Social History Narrative    Patient of Dr. Vela since 2016.         Used to work in the [employer].        Uses a walker.  Goes to day program at [facility] for dementia.     Current Outpatient Medications   Medication Sig Dispense Refill     cholecalciferol, vitamin D3, 1,000 unit tablet Take 2,000 Units by mouth daily.       glipiZIDE (GLUCOTROL XL) 5 MG 24 hr tablet Take 5 mg by mouth daily with breakfast.       insulin aspart U-100 (NOVOLOG) 100 unit/mL injection Inject under the skin 3 (three) times a day with meals. <200                0U    201-249         2U    250-299         4U    300-349         6U    350-399         8U    400-450        10U    451<             12U              insulin aspart U-100 (NOVOLOG) 100 unit/mL injection Inject 4 Units under the skin daily with supper. Hold for  or if not eating >50% of meal              metoprolol tartrate (LOPRESSOR) 25 MG tablet Take 12.5 mg by mouth 2 (two) times a day. Hold for SBP <140 or HR <60             No current facility-administered medications for this visit.      No Known Allergies      Review of Systems:    Constitutional: Negative.  Negative for fever, chills, has activity change, appetite change and fatigue.   HENT: Negative for congestion and facial swelling.    Eyes: Negative for photophobia, redness and visual disturbance.   Respiratory: Negative for cough and chest tightness.    Cardiovascular: Negative for chest pain, palpitations and leg swelling.   Gastrointestinal: Negative for nausea, diarrhea, constipation, blood in stool and abdominal distention.   Genitourinary: Negative.    Musculoskeletal: Negative.    Has a fall recently reported by nursing  also had ecchymosis of his wrist  Skin: Negative.    Neurological: Negative for dizziness, tremors, syncope, weakness, light-headedness and headaches.   Hematological: Does not bruise/bleed easily.   Psychiatric/Behavioral: Negative.  confused    Physical Exam:    Pressure  115/65 temp 98 pulse 72 weight is 140 pound  GENERAL: no acute distress. NON VERBAL; NON cooperative in conversation.   HEENT: pupils are equal, round and reactive. Oral mucosa is moist and intact.  Patient has some hearing loss and is legally blind  RESP:Chest symmetric. Regular respiratory rate. No stridor.  CVS: S1S2  ABD: Nondistended, soft.  EXTREMITIES: No lower extremity edema.  Left-sided weakness with some spasticity of his hand noted uses a walker for ambulation  NEURO: non focal. Alert and oriented xself. Confused with a very poor recall does not follow any commands any longer  PSYCH: within normal limits. No depression or anxiety.  SKIN: warm dry intact     Labs:      Lab Results   Component Value Date    HGBA1C 8.0 (H) 12/26/2018     Results for orders placed or performed in visit on 05/14/18   Basic Metabolic Panel   Result Value Ref Range    Sodium 138 136 - 145 mmol/L    Potassium 4.4 3.5 - 5.0 mmol/L    Chloride 105 98 - 107 mmol/L    CO2 25 22 - 31 mmol/L    Anion Gap, Calculation 8 5 - 18 mmol/L    Glucose 143 (H) 70 - 125 mg/dL    Calcium 8.6 8.5 - 10.5 mg/dL    BUN 37 (H) 8 - 28 mg/dL    Creatinine 1.81 (H) 0.70 - 1.30 mg/dL    GFR MDRD Af Amer 44 (L) >60 mL/min/1.73m2    GFR MDRD Non Af Amer 36 (L) >60 mL/min/1.73m2       .  Assessment/Plan:    Alzheimer's dementia with initial testing slums of 1/30; CPT of 3.3/5.6 BIMS 3/15  Status post V CVA with left lower extremity weakness chronic  Diabetes type 2-uncontrolled  Chronic kidney disease  Legal blindness  Dysphagia  Debilitation in a patient who remains a very high fall risk in light of his visual impairment and cognitive impairments  Hyperlipidemia o  Hypertension with  hypotension in this elderly gentleman  Weight gain of 10 pounds  Patient is in long-term care with no significant behavioral issues noted   recent falls but no injury. most of the time he tends to sleep and remains wheelchair-bound.  He is ambulating less and sleeping more   Diabetic control was assessed his last A1c was high at 8 however in light of the fact that he is eating less he has been taken off his scheduled NovoLog with meals  He continues on NovoLog 4 units at p.m.  Now on a lower dose of metoprolol due to hypotension at 12.5 Norvasc has been discontinued.  He has lost 10 pounds and is being monitored closely and being fed during meals at least once during the day.  Continue to monitor  I did talk to the  overall he is declining and have suspected that he is going to have advanced dementia so we are requesting family to reassess his goals of care and consider switching him to DNR/DNI CODE STATUS  to contact family    Electronically signed by: ADELINE Baptiste  This progress note was completed using Dragon software and there may be grammatical errors.

## 2021-06-25 NOTE — PROGRESS NOTES
Progress Notes by Jonathan Vela MD at 7/13/2017  9:00 AM     Author: Jonathan Vela MD Service: -- Author Type: Physician    Filed: 7/13/2017 11:52 AM Encounter Date: 7/13/2017 Status: Signed    : Jonathan Vela MD (Physician)              [facility]    Comprehensive and complex medical care - Chronic disease management - Shared decision making - Care coordination - Compassionate care    Patient advocacy - Rational deprescribing - Minimally disruptive medicine - Ethical focus - Customized care    Date of Service: 7/13/2017  Primary Provider: Jonathan Vela MD    Patient Care Team:  Jonathan Vela MD as PCP - General (Internal Medicine)  Erlin Smith MD as Physician (Ophthalmology)     ______________________________________________________________________     Patient's Pharmacy:    [facility]/pharmacy [address] [place] [id number]  Phone: [phone] Fax: [phone]     Patient's Insurance:    Payor: UCARE / Plan: ARE FOR SENIORS / Product Type: MEDICARE ADVANTAGE /     ______________________________________________________________________     Mo Swift is 82 y.o. male who comes in today for:    Chief Complaint   Patient presents with   ? Follow-up       Patient Active Problem List   Diagnosis   ? Stenosis of right carotid artery   ? HLD (hyperlipidemia)   ? HTN (hypertension)   ? Stroke - multiple lacunar infarcts (CT of 4/12/16) with LLE residual weakness per daughter.  4/29/16   ? CKD (chronic kidney disease) stage 3, GFR 30-59 ml/min   ? PN (peripheral neuropathy)   ? DM type 2 (diabetes mellitus, type 2)   ? Alzheimer's dementia   ? ASCVD (arteriosclerotic cardiovascular disease)   ? Legally blind   ? Former smoker   ? Microalbuminuria   ? Full code status     Current Outpatient Prescriptions   Medication Sig Note   ? amLODIPine (NORVASC) 10 MG tablet TAKE 1 TABLET (10 MG TOTAL) BY  MOUTH DAILY.    ? glipiZIDE (GLUCOTROL) 5 MG tablet Take 2.5 mg by mouth daily with breakfast.    ? metoprolol tartrate (LOPRESSOR) 25 MG tablet TAKE 1 TABLET TWICE DAILY.    ? multivitamin (MULTIVITAMIN) per tablet Take 1 tablet by mouth daily. 6/2/2016: Does not take regularly   ? pravastatin (PRAVACHOL) 20 MG tablet TAKE 1 TABLET (20 MG TOTAL) BY MOUTH EVERY EVENING.      Social History     Social History Narrative    Patient of Dr. Vela since 2016.         Used to work in the [employer].        Uses a walker.  Goes to day program at [facility] for dementia.     ______________________________________________________________________     History of present illness:    The patient comes in today with his daughter.    We first reviewed his recent concerns about vulnerable adult.  This was raised by his adult  program through the [facility].  Apparently he has come in with feces and being extremely wet.  He is transported by a medical van.  The patient has had no open sores related to this.  He does have troubles getting around related to his history of stroke and arthritis.  This is more pronounced in his left hip.  He does use a walker.  I have reviewed the notes from the adult  and from the [facility].  I reviewed some of this with his daughter today.  She is going to try to work out things with the adult  or see if he is too much for them to handle.    Her mother is in a nursing home right now for transitional care, but she is trying to convince both of them to go into the nursing home long-term.  This would help with a lot of issues related to this.  Her brother does most of the care for both of them.  He may be taking a toll on his own mental and physical health.  Next    The patient does have fecal and urinary incontinence.  The fecal incontinence has not improved since being off the donepezil.  He does wear a diaper most the time.    He has had strokes in the past.   His blood pressure has been running higher in adult  and we talked about doing something more with this.    On review of systems, the patient denies any chest pain or shortness of breath.    ______________________________________________________________________    Exam:    Wt Readings from Last 3 Encounters:   05/25/17 156 lb (70.8 kg)   03/13/17 154 lb (69.9 kg)   01/20/17 154 lb 12.8 oz (70.2 kg)     BP Readings from Last 3 Encounters:   07/13/17 144/62   05/25/17 142/64   01/20/17 146/62     /62  Pulse (!) 56  SpO2 94%   The patient is comfortable, no acute distress.  Mood good.  Insight poor.  He does not always answer questions as they were asked.  Eyes are nonicteric.  Neck is supple without mass.  No cervical adenopathy.  No thyromegaly. Heart regular rate and rhythm.  Lungs clear to auscultation bilaterally.  Respiratory effort is good.  Abdomen soft and nontender.  No hepatosplenomegaly.  Extremities no edema.      ______________________________________________________________________    Diagnostics:    Results for orders placed or performed in visit on 01/20/17   Glycosylated Hemoglobin A1c   Result Value Ref Range    Hemoglobin A1c 7.3 (H) 3.5 - 6.0 %   Basic Metabolic Panel   Result Value Ref Range    Sodium 138 136 - 145 mmol/L    Potassium 4.9 3.5 - 5.0 mmol/L    Chloride 105 98 - 107 mmol/L    CO2 25 22 - 31 mmol/L    Anion Gap, Calculation 8 5 - 18 mmol/L    Glucose 194 (H) 70 - 125 mg/dL    Calcium 8.8 8.5 - 10.5 mg/dL    BUN 40 (H) 8 - 28 mg/dL    Creatinine 1.63 (H) 0.70 - 1.30 mg/dL    GFR MDRD Af Amer 49 (L) >60 mL/min/1.73m2    GFR MDRD Non Af Amer 41 (L) >60 mL/min/1.73m2   BNP(B-type Natriuretic Peptide)   Result Value Ref Range    BNP 42 0 - 91 pg/mL   Hepatic Profile   Result Value Ref Range    Bilirubin, Total 0.4 0.0 - 1.0 mg/dL    Bilirubin, Direct 0.1 <=0.5 mg/dL    Protein, Total 5.7 (L) 6.0 - 8.0 g/dL    Albumin 3.5 3.5 - 5.0 g/dL    Alkaline Phosphatase 98 45 - 120  U/L    AST 17 0 - 40 U/L    ALT 15 0 - 45 U/L      ______________________________________________________________________    Assessment:    1. Alzheimer's dementia    2. HTN (hypertension)    3. CVA (cerebral vascular accident)    4. Fecal incontinence    5. Urinary incontinence       ______________________________________________________________________      Lab Results   Component Value Date    LDLCALC 94 03/23/2015       PHQ-2 Total Score: 0 (7/13/2017 11:00 AM)  Depression Follow-up Plan: patient follow-up to return when and if necessary (7/13/2017 11:00 AM)  No Data Recorded    Plan:    1. Recommend that they eventually transition him to a memory care unit.  Hopefully this could be in a nursing home setting.  2. His daughter will try to work out issues with the adult day program.  She might be able to get additional PCA hours through the [place] the adult day program does not work out.  3. It is harder for his son to take care of him and his mother.  4. We will increase his amlodipine for his blood pressure today.  5. Follow-up again in 3 months with blood work to be done.  6. Diarrhea and fecal incontinence doing better off of Aricept.  7. Family has wanted him to stay off of aspirin due to severe bruising.    Jonathan Vela MD  General Internal Medicine  [facility]     Return in about 3 months (around 10/13/2017) for visit and blood work.     30 minutes or greater were spent with the patient today with greater than 50% of the times spent in counseling and coordination of care.

## 2021-06-25 NOTE — PROGRESS NOTES
Progress Notes by Jonathan Vela MD at 1/20/2017  3:05 PM     Author: Jonathan Vela MD Service: -- Author Type: Physician    Filed: 1/22/2017  9:23 PM Encounter Date: 1/20/2017 Status: Signed    : Jonathan Vela MD (Physician)              [facility]    Comprehensive and complex medical care - Chronic disease management - Shared decision making - Care coordination - Compassionate care    Date of Service: 1/20/2017  Primary Provider: Jonathan Vela MD    Patient Care Team:  Jonathan Vela MD as PCP - General (Internal Medicine)     ______________________________________________________________________     Patient's Pharmacy:    [facility]/pharmacy [place] [address]  Phone: [phone] Fax: [phone]     Patient's Insurance:    Payor: ARE / Plan: [facility] / Product Type: MEDICARE ADVANTAGE /     ______________________________________________________________________     Mo Swift is 82 y.o. male who comes in today for:     Chief Complaint   Patient presents with   ? Paperwork     Swelling in left leg       Patient Active Problem List   Diagnosis   ? Stenosis of right carotid artery   ? HLD (hyperlipidemia)   ? HTN (hypertension)   ? Stroke - multiple lacunar infarcts (CT of 4/12/16) with LLE residual weakness per daughter.  4/29/16   ? CKD (chronic kidney disease) stage 3, GFR 30-59 ml/min   ? PN (peripheral neuropathy)   ? DM type 2 (diabetes mellitus, type 2)   ? Alzheimer's dementia   ? ASCVD (arteriosclerotic cardiovascular disease)   ? Legally blind   ? Former smoker   ? Microalbuminuria   ? Full code status     Past Medical History   Diagnosis Date   ? Alzheimer's dementia    ? ASCVD (arteriosclerotic cardiovascular disease)    ? CAD (coronary artery disease)    ? Chronic diarrhea    ? CKD (chronic kidney disease) stage 3, GFR 30-59 ml/min    ? CVA (cerebral  vascular accident)      2010, POST REPAIR OF LEFT HIP FRACTURE, RESIDUAL LEFT SIDE WEAKNESS     ? DM type 2 (diabetes mellitus, type 2)    ? Falls 06/02/2016     with L side pain   ? Former smoker    ? Full code status 1/22/2017   ? Glaucoma    ? HLD (hyperlipidemia)    ? HTN (hypertension)    ? Legally blind    ? Macular degeneration    ? Microalbuminuria    ? PN (peripheral neuropathy)    ? Pubic bone fracture 06/02/2016     L   ? Stenosis of right carotid artery      Right 75%        Current Outpatient Prescriptions   Medication Sig Note   ? amLODIPine (NORVASC) 5 MG tablet Take 1 tablet (5 mg total) by mouth daily.    ? donepezil (ARICEPT) 10 MG tablet Take 10 mg by mouth every morning.    ? glipiZIDE (GLUCOTROL) 5 MG tablet Take 2.5 mg by mouth daily with breakfast.    ? metoprolol tartrate (LOPRESSOR) 25 MG tablet TAKE 1 TABLET TWICE DAILY.    ? multivitamin (MULTIVITAMIN) per tablet Take 1 tablet by mouth daily. 6/2/2016: Does not take regularly   ? pravastatin (PRAVACHOL) 20 MG tablet TAKE 1 TABLET (20 MG TOTAL) BY MOUTH EVERY EVENING.      Social History     Social History   ? Marital status:      Spouse name: Virginia   ? Number of children: N/A   ? Years of education: N/A     Occupational History   ?  Retired     Social History Main Topics   ? Smoking status: Former Smoker     Quit date: 6/2/1987   ? Smokeless tobacco: Former User      Comment: Oral tobacco only briefly.  Smoked 34 years.   ? Alcohol use No      Comment: He has a history of issues with alcohol.   ? Drug use: No   ? Sexual activity: Not Asked     Other Topics Concern   ? None     Social History Narrative    Patient of Dr. Vela since 2016.         Used to work in the [employer].        Uses a walker.  Goes to day program at [facility] for dementia.     Family History   Problem Relation Age of Onset   ? Dementia Mother      In stolen car.  Left out (literally) to freeze to death at 88 years.   ? Kidney disease  Father       of kidney disease.   ? Other Brother      Only recently learned that he existed.  Health is unknown.      Family history is otherwise noncontributory.    ______________________________________________________________________     History of present illness:    The patient comes in today with his daughter Oralia.    They need a form filled out for his adult  through the [facility].  A good amount of our time was spent doing this today.    We reviewed his Alzheimer's dementia.  This is stable for him.  He does have good and bad days.  He does have some issues with sundowning.    We reviewed his diabetes.  We are going to continue to follow-up on this as needed.  He has been doing well with this.  There are no concerns.    He has had some issues with leg swelling.  It may be slightly more prominent on the left.  This is the side he had a hip replacement, his stroke, and recent pelvic fracture.  This was reviewed with them.  He denies any shortness of breath.  He does also have renal insufficiency.    We reviewed his coronary disease and he has had no problems here.  His blood pressure has generally been doing well.    We are following up on his kidney disease.  His gait has been good overall.    10 point review of systems is negative unless noted.      ______________________________________________________________________     Exam:    Wt Readings from Last 3 Encounters:   17 154 lb 12.8 oz (70.2 kg)   10/28/16 149 lb 9.6 oz (67.9 kg)   16 149 lb 6.4 oz (67.8 kg)     BP Readings from Last 3 Encounters:   17 146/62   10/28/16 130/42   16 148/66      Visit Vitals   ? /62 (Patient Site: Right Arm, Patient Position: Sitting, Cuff Size: Adult Regular)   ? Pulse (!) 56   ? Wt 154 lb 12.8 oz (70.2 kg)   ? SpO2 98%   ? BMI 24.25 kg/m2      The patient is comfortable, no acute distress.  Mood good.  Insight is fair to poor.  No skin lesions or nodules of concern.  Ears  clear.  Eyes are nonicteric.  Pupils equal and reactive.  Throat is clear.  Neck is supple without mass, no thyromegaly.  Carotids are clear.  No cervical or epitrochlear adenopathy.  Heart regular rate and rhythm.  Lungs clear to auscultation bilaterally.  Respiratory effort good.  Abdomen soft and nontender.  No hepatosplenomegaly.  Extremities show trace-1+ edema.      ______________________________________________________________________    Recent Results (from the past 168 hour(s))   Glycosylated Hemoglobin A1c   Result Value Ref Range    Hemoglobin A1c 7.3 (H) 3.5 - 6.0 %   Basic Metabolic Panel   Result Value Ref Range    Sodium 138 136 - 145 mmol/L    Potassium 4.9 3.5 - 5.0 mmol/L    Chloride 105 98 - 107 mmol/L    CO2 25 22 - 31 mmol/L    Anion Gap, Calculation 8 5 - 18 mmol/L    Glucose 194 (H) 70 - 125 mg/dL    Calcium 8.8 8.5 - 10.5 mg/dL    BUN 40 (H) 8 - 28 mg/dL    Creatinine 1.63 (H) 0.70 - 1.30 mg/dL    GFR MDRD Af Amer 49 (L) >60 mL/min/1.73m2    GFR MDRD Non Af Amer 41 (L) >60 mL/min/1.73m2   BNP(B-type Natriuretic Peptide)   Result Value Ref Range    BNP 42 0 - 91 pg/mL   Hepatic Profile   Result Value Ref Range    Bilirubin, Total 0.4 0.0 - 1.0 mg/dL    Bilirubin, Direct 0.1 <=0.5 mg/dL    Protein, Total 5.7 (L) 6.0 - 8.0 g/dL    Albumin 3.5 3.5 - 5.0 g/dL    Alkaline Phosphatase 98 45 - 120 U/L    AST 17 0 - 40 U/L    ALT 15 0 - 45 U/L       ______________________________________________________________________     ______________________________________________________________________     Assessment:    1. DM type 2 (diabetes mellitus, type 2)  Glycosylated Hemoglobin A1c    Basic Metabolic Panel   2. CKD (chronic kidney disease) stage 3, GFR 30-59 ml/min  Basic Metabolic Panel   3. HTN (hypertension)  Basic Metabolic Panel   4. Bilateral lower extremity edema  Basic Metabolic Panel    BNP(B-type Natriuretic Peptide)    Hepatic Profile   5. Microalbuminuria     6. Encounter for completion of  form with patient     7. Counseling for living will     8. Full code status     9. CVA (cerebral vascular accident)     10. Legally blind     11. ASCVD (arteriosclerotic cardiovascular disease)     12. Alzheimer's dementia        ______________________________________________________________________     Quality review:    Health Maintenance Due   Topic Date Due   ? DIABETES FOOT EXAM  11/27/1944   ? DIABETES OPHTHALMOLOGY EXAM  11/27/1944   ? ZOSTER VACCINE  11/27/1994   ? ADVANCE DIRECTIVES DISCUSSED WITH PATIENT  11/03/2009   ? DIABETES URINE MICROALBUMIN  05/05/2015       History   Smoking Status   ? Former Smoker   ? Quit date: 6/2/1987   Smokeless Tobacco   ? Former User     Comment: Oral tobacco only briefly.  Smoked 34 years.        PHQ-2 Total Score: 0 (7/11/2016  3:00 PM)  No Data Recorded  ______________________________________________________________________       Plan:    1. Form filled out today.  2. Consider short course of diuretics in the future if worsening.  3. No concerning signs for his swelling.  4. They did try him off the Aricept which made his memory issues more prominent, so they resumed the medication.  5. Continue current medications as is.  6. Blood work done today.    Jonathan Vela MD  General Internal Medicine  [facility]    Return in about 6 months (around 7/20/2017) for visit and blood work.

## 2021-06-25 NOTE — PROGRESS NOTES
Progress Notes by Jonathan Vela MD at 5/25/2017  4:35 PM     Author: Jonathan Vela MD Service: -- Author Type: Physician    Filed: 5/29/2017  7:02 PM Encounter Date: 5/25/2017 Status: Signed    : Jonathan Vela MD (Physician)              [facility]    Comprehensive and complex medical care - Chronic disease management - Shared decision making - Care coordination - Compassionate care    Patient advocacy - Rational deprescribing - Minimally disruptive medicine - Ethical focus - Customized care    Date of Service: 5/25/2017  Primary Provider: Jonathan Vela MD    Patient Care Team:  Jonathan Vela MD as PCP - General (Internal Medicine)     ______________________________________________________________________     Patient's Pharmacy:    [facility]/pharmacy [place] [address]  Phone: [phone] Fax: [phone]     Patient's Insurance:    Payor: [facility] / Plan: [facility] / Product Type: MEDICARE ADVANTAGE /       ______________________________________________________________________     Routine physical - male, age 65 and older.    Mo Swift is a 82 y.o. male coming in today for a routine physical.  He does not have other issues outside the physical to discuss as well.    Past Medical History:   Diagnosis Date   ? Alzheimer's dementia    ? ASCVD (arteriosclerotic cardiovascular disease)    ? CAD (coronary artery disease)    ? Chronic diarrhea    ? CKD (chronic kidney disease) stage 3, GFR 30-59 ml/min    ? CVA (cerebral vascular accident)     2010, POST REPAIR OF LEFT HIP FRACTURE, RESIDUAL LEFT SIDE WEAKNESS     ? DM type 2 (diabetes mellitus, type 2)    ? Falls 06/02/2016    with L side pain   ? Former smoker    ? Full code status 1/22/2017   ? Glaucoma    ? HLD (hyperlipidemia)    ? HTN (hypertension)    ? Legally blind    ? Macular degeneration    ? Microalbuminuria    ?  PN (peripheral neuropathy)    ? Pubic bone fracture 2016    L   ? Stenosis of right carotid artery     Right 75%       Social History     Social History   ? Marital status:      Spouse name: [place]   ? Number of children: N/A   ? Years of education: N/A     Occupational History   ?  Retired     Social History Main Topics   ? Smoking status: Former Smoker     Quit date: 1987   ? Smokeless tobacco: Former User      Comment: Oral tobacco only briefly.  Smoked 34 years.   ? Alcohol use No      Comment: He has a history of issues with alcohol.   ? Drug use: No   ? Sexual activity: Not Asked     Other Topics Concern   ? None     Social History Narrative    Patient of Dr. Vela since 2016.         Used to work in the [employer].        Uses a walker.  Goes to day program at [facility] for dementia.      Family History   Problem Relation Age of Onset   ? Dementia Mother      In stolen car.  Left out (literally) to freeze to death at 88 years.   ? Kidney disease Father       of kidney disease.   ? Other Brother      Only recently learned that he existed.  Health is unknown.     Family history is otherwise noncontributory.    Current Outpatient Prescriptions   Medication Sig Note   ? amLODIPine (NORVASC) 5 MG tablet Take 1 tablet (5 mg total) by mouth daily.    ? donepezil (ARICEPT) 10 MG tablet TAKE 1 TABLET BY MOUTH EVERY DAY    ? glipiZIDE (GLUCOTROL) 5 MG tablet Take 2.5 mg by mouth daily with breakfast.    ? metoprolol tartrate (LOPRESSOR) 25 MG tablet TAKE 1 TABLET TWICE DAILY.    ? multivitamin (MULTIVITAMIN) per tablet Take 1 tablet by mouth daily. 2016: Does not take regularly   ? pravastatin (PRAVACHOL) 20 MG tablet TAKE 1 TABLET (20 MG TOTAL) BY MOUTH EVERY EVENING.      Immunization History   Administered Date(s) Administered   ? DT (pediatric) 1997   ? Influenza, inj, historic 2007, 2008, 10/20/2016   ? Influenza, seasonal,quad inj 6-35 mos  "09/23/2009, 12/10/2014   ? Pneumo Conj 13-V (2010&after) 03/23/2015   ? Pneumo Polysac 23-V 11/01/1997, 10/22/2003, 07/02/2008   ? Td, historic 11/14/2007   ? Tdap 05/18/2015      ______________________________________________________________________     History of present illness:    The patient comes in today with his daughter.    He has no major concerns overall.  He has had some diarrhea in the last week.  He's had 3-4 episodes.  It is helped with Imodium.  He's had no blood in his stools.    Reviewed CODE STATUS with them and he wants to be full code.  His daughter is his power of .    He is to for Zostavax.  He does not want to have this.    We reviewed his Alzheimer's dementia and memory issues.  He forgets that he needs to wear his incontinence pad.  This is frustrating for them.  They are considering going off the omeprazole doesn't seem to be helping him much at this point.    We reviewed his diabetes.  They want to wait on blood work until the next visit.    We reviewed his other issues noted in the assessment but not specifically addressed in the HPI above.      The 10-system review of systems and health history form for [facility] was completed by patient, reviewed by me, and pertinent problems are reviewed above.   ______________________________________________________________________    Exam:  Wt Readings from Last 3 Encounters:   05/25/17 156 lb (70.8 kg)   03/13/17 154 lb (69.9 kg)   01/20/17 154 lb 12.8 oz (70.2 kg)     BP Readings from Last 3 Encounters:   05/25/17 142/64   01/20/17 146/62   10/28/16 130/42     /64  Pulse 60  Temp 96.8  F (36  C) (Oral)   Ht 5' 7\" (1.702 m)  Wt 156 lb (70.8 kg)  BMI 24.43 kg/m2   The patient is in no acute distress.  Mood good.  Insight   Poor.  No skin lesions or nodules of concern.  Ears are clear.  Nose is clear.  Throat is clear.  Neck is supple and there is no thyromegaly.  No carotid bruits.  No cervical, epitrochlear or axillary " adenopathy.  Heart regular rate and rhythm.   no murmur present.  Lungs clear to auscultation bilaterally.  Respiratory effort is good.  Abdomen is soft, nontender.  No hepatosplenomegaly.   Extremities show 1+ edema, distal pulses strong.  Neuro exam shows normal strength in all muscle groups and normal reflexes. His gait is slow and slouched.  ______________________________________________________________________    Diagnostics:    Results for orders placed or performed in visit on 01/20/17   Glycosylated Hemoglobin A1c   Result Value Ref Range    Hemoglobin A1c 7.3 (H) 3.5 - 6.0 %   Basic Metabolic Panel   Result Value Ref Range    Sodium 138 136 - 145 mmol/L    Potassium 4.9 3.5 - 5.0 mmol/L    Chloride 105 98 - 107 mmol/L    CO2 25 22 - 31 mmol/L    Anion Gap, Calculation 8 5 - 18 mmol/L    Glucose 194 (H) 70 - 125 mg/dL    Calcium 8.8 8.5 - 10.5 mg/dL    BUN 40 (H) 8 - 28 mg/dL    Creatinine 1.63 (H) 0.70 - 1.30 mg/dL    GFR MDRD Af Amer 49 (L) >60 mL/min/1.73m2    GFR MDRD Non Af Amer 41 (L) >60 mL/min/1.73m2   BNP(B-type Natriuretic Peptide)   Result Value Ref Range    BNP 42 0 - 91 pg/mL   Hepatic Profile   Result Value Ref Range    Bilirubin, Total 0.4 0.0 - 1.0 mg/dL    Bilirubin, Direct 0.1 <=0.5 mg/dL    Protein, Total 5.7 (L) 6.0 - 8.0 g/dL    Albumin 3.5 3.5 - 5.0 g/dL    Alkaline Phosphatase 98 45 - 120 U/L    AST 17 0 - 40 U/L    ALT 15 0 - 45 U/L      ______________________________________________________________________     Assessment:    1. Routine physical examination    2. HTN (hypertension)    3. Encounter for completion of form with patient    4. Full code status    5. Alzheimer's dementia    6. ASCVD (arteriosclerotic cardiovascular disease)    7. DM type 2 (diabetes mellitus, type 2)    8. CKD (chronic kidney disease) stage 3, GFR 30-59 ml/min    9. PN (peripheral neuropathy)    10. HLD (hyperlipidemia)    11. UI (urinary incontinence)        ______________________________________________________________________     ______________________________________________________________________    QUALITY REVIEW      Health Maintenance Due   Topic Date Due   ? DIABETES OPHTHALMOLOGY EXAM  11/27/1944   ? DIABETES URINE MICROALBUMIN  05/05/2015       History   Smoking Status   ? Former Smoker   ? Quit date: 6/2/1987   Smokeless Tobacco   ? Former User     Comment: Oral tobacco only briefly.  Smoked 34 years.        PHQ-2 Total Score: 0 (7/11/2016  3:00 PM)  No Data Recorded     ______________________________________________________________________       Plan:    1. FULL CODE STATUS.   2. Follow-up again in 4-6 months.  3. No new recommendations today.  4. We filled out paperwork today in relationship to his adult  and his daughter's FMLA    Jonathan Vela MD  General Internal Medicine  [facility]    Return in about 4 months (around 9/25/2017) for visit and blood work.

## 2023-03-21 NOTE — PROGRESS NOTES
Subjective:       Patient ID: Raina Nuñez is a 51 y.o. female.    Chief Complaint: Post-op Evaluation (Here for 2 week post op after RAH/BSO- (see op note/path report). Pt is c/o vaginal discharge with odor. Still bleeding some, has to wear a pad.)    Presents 2 weeks following robotic hysterectomy with BSO.      She has had some light spotting.  Some odor.  No other complaints.      Pathology report reviewed.  Benign ovaries fallopian tubes and uterus.  Some benign leiomyomas were noted total uterine size 267 g.  Review of Systems      Objective:      Physical Exam  Genitourinary:     Comments: External normal vault normal cuff well-healed.  No abnormal discharge noted today.  No blood noted today.      Assessment:       1. Postop check        Plan:       Patient Instructions   Discussed normal postoperative exam today.      She will notify if any problems arise.      Gradually increase activity.      Discussed no sexual relations for another 6 weeks    Follow-up with me in 2 months.       [facility]      Code Status:  FULL CODE  Visit Type: Problem Visit     Facility:  [facility] [[id number]]        -2    History of Present Illness: Mo Swift is a 83 y.o. male who is currently admitted to [facility] on the memory care unit.  He was initially admitted to the TCU as a transfer from the hospital.  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  He was noted to have an aspiration event episode while at the  center.  He underwent a Heimlich maneuver and he was then sent to the emergency room.  His wife who also helps him as his caregiver was in the TCU herself during the same time.  His children were unable to meet his needs and they recommended placement in the TCU for him also for PT OT strengthening and rehab.  He did have a bedside swallow eval with no aspiration event noted and an x-ray chest was negative. He was subsequently discharged on his current medication regimen.      While in the TCU he did quite well with mood and behaviors remain stable though he tested extremely poorly cognitively indicating moderate to severe dementia.  His initial testing slums was 1/30 and a repeat CPT was 3.3. Currently he ambulates using a 4 wheeled walker but remains a high fall risk and 24 hour supervision. Recently per nursing he has been ambulating less though, mostly wc bound.  BS more controlled after initiating basaglar insulin, though BS were consistently 70s in am, lantus discontinued. Previously glipizide was discontinued per worsening renal fx.  Has higher BS at noon still, will increase novolog at noon and maintain PM with SS and monitor.  Intent to just use bolus coverage.    Patient denies pain, headache, chest pain, numbness or tingling, shortest of breath, eating or  swallowing concerns, nausea or vomiting, diarrhea or bowel abnormalities, or no new integumentary concerns today.    Past Medical History:   Diagnosis Date     Alzheimer's dementia      ASCVD (arteriosclerotic cardiovascular disease)      CAD (coronary artery disease)      Chronic diarrhea      CKD (chronic kidney disease) stage 3, GFR 30-59 ml/min      CVA (cerebral vascular accident)     , POST REPAIR OF LEFT HIP FRACTURE, RESIDUAL LEFT SIDE WEAKNESS       DM type 2 (diabetes mellitus, type 2)      Falls 2016    with L side pain     Former smoker      Full code status 2017     Glaucoma      HLD (hyperlipidemia)      HTN (hypertension)      Legally blind      Macular degeneration      Microalbuminuria      PN (peripheral neuropathy)      Pubic bone fracture (H) 2016    L     Stenosis of right carotid artery     Right 75%       Past Surgical History:   Procedure Laterality Date     APPENDECTOMY       CAROTID ENDARTERECTOMY Right      CHOLECYSTECTOMY      Early .  Biopsy of liver.     shock tx      50 years ago fo paranoid schizophrenia     TOTAL HIP ARTHROPLASTY Left      Family History   Problem Relation Age of Onset     Dementia Mother      In stolen car.  Left out (literally) to freeze to death at 88 years.     Kidney disease Father       of kidney disease.     Other Brother      Only recently learned that he existed.  Health is unknown.     Social History     Social History     Marital status:      Spouse name: Virginia     Number of children: N/A     Years of education: N/A     Occupational History      Retired     Social History Main Topics     Smoking status: Former Smoker     Quit date: 1987     Smokeless tobacco: Former User      Comment: Oral tobacco only briefly.  Smoked 34 years.     Alcohol use No      Comment: He has a history of issues with alcohol.     Drug use: No     Sexual activity: Not on file     Other Topics Concern     Not on file     Social History  Narrative    Patient of Dr. Vela since 2016.         Used to work in the [employer].        Uses a walker.  Goes to day program at [facility] for dementia.     Current Outpatient Prescriptions   Medication Sig Dispense Refill     amLODIPine (NORVASC) 10 MG tablet Take 10 mg by mouth daily.       cholecalciferol, vitamin D3, 1,000 unit tablet Take 2,000 Units by mouth daily.       insulin aspart U-100 (NOVOLOG) 100 unit/mL injection Inject under the skin 3 (three) times a day before meals. <200                0U    201-249         2U    250-299         4U    300-349         6U    350-399         8U    400-450        10U    451<             12U       insulin aspart U-100 (NOVOLOG) 100 unit/mL injection Inject 6 Units under the skin daily with supper. Hold for  or if not eating >50% of meal        insulin aspart U-100 (NOVOLOG) 100 unit/mL injection Inject 10 Units under the skin daily with lunch.        metoprolol tartrate (LOPRESSOR) 25 MG tablet Take 25 mg by mouth 2 (two) times a day.       No current facility-administered medications for this visit.      No Known Allergies    Review of Systems:    Constitutional: Negative.  Negative for fever, chills, has activity change, appetite change and fatigue. Denies pain. Continues to have higher BSs at noon.  HENT: Negative for congestion and facial swelling.    Eyes: Negative for photophobia, redness and visual disturbance.   Respiratory: Negative for cough and chest tightness.    Cardiovascular: Negative for chest pain, palpitations and leg swelling.   Gastrointestinal: Negative for nausea, diarrhea, constipation, blood in stool and abdominal distention.   Genitourinary: Negative.    Musculoskeletal: Negative.    Skin: Negative.  Intact.   Neurological: Negative for dizziness, tremors, syncope, weakness, light-headedness and headaches.   Hematological: Does not bruise/bleed easily.   Psychiatric/Behavioral: Negative.  confused at baseline.  Stable.    Vitals:    09/20/18 1825   BP: 150/78   Pulse: 75   Resp: 18   Temp: 97.8  F (36.6  C)   SpO2: 92%       Physical Exam:    GENERAL: no acute distress. Cooperative.  Denies pain.   HEENT: pupils are equal, round and reactive. Oral mucosa is moist and intact.  Patient has some hearing loss and is legally blind.  RESP:Chest symmetric. Regular respiratory rate. No stridor.  CVS: S1S2, no murmur, rub, or gallop.  ABD: Nondistended, soft. No constipation or diarrhea.  EXTREMITIES: No lower extremity edema.  Left-sided weakness.  More bed bound and uses wc occasionally.   NEURO: no focal deficits. Alert and oriented x2-3. Confused with a very poor recall.  PSYCH: within normal limits. No depression or anxiety.  SKIN: warm dry intact       Labs:    No results found for this or any previous visit (from the past 240 hour(s)).  Lab Results   Component Value Date    HGBA1C 6.9 (H) 05/14/2018     Results for orders placed or performed in visit on 05/14/18   Basic Metabolic Panel   Result Value Ref Range    Sodium 138 136 - 145 mmol/L    Potassium 4.4 3.5 - 5.0 mmol/L    Chloride 105 98 - 107 mmol/L    CO2 25 22 - 31 mmol/L    Anion Gap, Calculation 8 5 - 18 mmol/L    Glucose 143 (H) 70 - 125 mg/dL    Calcium 8.6 8.5 - 10.5 mg/dL    BUN 37 (H) 8 - 28 mg/dL    Creatinine 1.81 (H) 0.70 - 1.30 mg/dL    GFR MDRD Af Amer 44 (L) >60 mL/min/1.73m2    GFR MDRD Non Af Amer 36 (L) >60 mL/min/1.73m2           Xr Chest Pa And Lateral  Result Date: 7/21/2017  XR CHEST PA AND LATERAL 7/21/2017 2:49 PM INDICATION: Aspiration COMPARISON: 7/8/2015 FINDINGS: Lung volumes are low with crowding of the pulmonary markings. No definite infiltrate or pleural effusion.    Assessment/Plan:    1.  HTN: Borderline, stable on metoprolol 25mg BID and norvasc 10mg daily, allowed some permissive hypertension.  2.  Last Cr 1.81 with GFR 36, declined in fx  3.  DM: today dc glipizide per worsening renal fx, previously dc'd lantus per hypoglycemia,  continue SS three times a day with meals and increase novolog to 10U at noon and maintain 6U at PM, hold for BS >140, last A1c of 6.9 (improved).  Will recheck A1c in dec.  4.  Vit D def: continue D3 2000U daily, last vit D 34.1 on 12/2017.  5.  Dementia: No behaviors, stable.  6.  Dysphagia: Continues on mech soft and regular consistency, no change.      The care plan has been reviewed and all orders signed. Changes to care plan, if any, as noted. Otherwise, continue care plan of care.     Electronically signed by: Aldo Solano NP